# Patient Record
Sex: MALE | Race: WHITE | Employment: OTHER | URBAN - METROPOLITAN AREA
[De-identification: names, ages, dates, MRNs, and addresses within clinical notes are randomized per-mention and may not be internally consistent; named-entity substitution may affect disease eponyms.]

---

## 2017-01-02 ENCOUNTER — APPOINTMENT (OUTPATIENT)
Dept: CARDIAC REHAB | Age: 72
End: 2017-01-02
Payer: MEDICARE

## 2017-01-04 ENCOUNTER — APPOINTMENT (OUTPATIENT)
Dept: CARDIAC REHAB | Age: 72
End: 2017-01-04
Payer: MEDICARE

## 2017-01-06 ENCOUNTER — APPOINTMENT (OUTPATIENT)
Dept: CARDIAC REHAB | Age: 72
End: 2017-01-06
Payer: MEDICARE

## 2017-01-09 ENCOUNTER — HOSPITAL ENCOUNTER (OUTPATIENT)
Dept: CARDIAC REHAB | Age: 72
End: 2017-01-09
Payer: MEDICARE

## 2017-01-11 ENCOUNTER — HOSPITAL ENCOUNTER (OUTPATIENT)
Dept: CARDIAC REHAB | Age: 72
Discharge: HOME | End: 2017-01-11
Payer: MEDICARE

## 2017-01-13 ENCOUNTER — HOSPITAL ENCOUNTER (OUTPATIENT)
Dept: CARDIAC REHAB | Age: 72
Discharge: HOME | End: 2017-01-13
Payer: MEDICARE

## 2017-01-16 ENCOUNTER — APPOINTMENT (OUTPATIENT)
Dept: CARDIAC REHAB | Age: 72
End: 2017-01-16
Payer: MEDICARE

## 2017-01-18 ENCOUNTER — HOSPITAL ENCOUNTER (OUTPATIENT)
Dept: CARDIAC REHAB | Age: 72
Discharge: HOME | End: 2017-01-18
Payer: MEDICARE

## 2017-01-18 VITALS — BODY MASS INDEX: 23.15 KG/M2 | WEIGHT: 166 LBS

## 2017-01-18 PROCEDURE — 93798 PHYS/QHP OP CAR RHAB W/ECG: CPT

## 2017-01-20 ENCOUNTER — APPOINTMENT (OUTPATIENT)
Dept: CARDIAC REHAB | Age: 72
End: 2017-01-20
Payer: MEDICARE

## 2017-01-23 ENCOUNTER — APPOINTMENT (OUTPATIENT)
Dept: CARDIAC REHAB | Age: 72
End: 2017-01-23
Payer: MEDICARE

## 2017-01-25 ENCOUNTER — HOSPITAL ENCOUNTER (OUTPATIENT)
Dept: CARDIAC REHAB | Age: 72
Discharge: HOME | End: 2017-01-25
Payer: MEDICARE

## 2017-01-25 ENCOUNTER — HOSPITAL ENCOUNTER (EMERGENCY)
Age: 72
Discharge: HOME | End: 2017-01-25
Attending: EMERGENCY MEDICINE
Payer: MEDICARE

## 2017-01-25 ENCOUNTER — APPOINTMENT (OUTPATIENT)
Dept: GENERAL RADIOLOGY | Age: 72
End: 2017-01-25
Attending: EMERGENCY MEDICINE
Payer: MEDICARE

## 2017-01-25 VITALS — WEIGHT: 165.2 LBS | BODY MASS INDEX: 23.04 KG/M2

## 2017-01-25 VITALS
TEMPERATURE: 98.6 F | WEIGHT: 165.2 LBS | OXYGEN SATURATION: 98 % | RESPIRATION RATE: 18 BRPM | BODY MASS INDEX: 23.13 KG/M2 | SYSTOLIC BLOOD PRESSURE: 145 MMHG | HEART RATE: 63 BPM | DIASTOLIC BLOOD PRESSURE: 74 MMHG | HEIGHT: 71 IN

## 2017-01-25 DIAGNOSIS — R55 SYNCOPE AND COLLAPSE: Primary | ICD-10-CM

## 2017-01-25 LAB
ALBUMIN SERPL BCP-MCNC: 3.5 G/DL (ref 3.2–4.6)
ALBUMIN/GLOB SERPL: 1 {RATIO} (ref 1.2–3.5)
ALP SERPL-CCNC: 71 U/L (ref 50–136)
ALT SERPL-CCNC: 26 U/L (ref 12–65)
ANION GAP BLD CALC-SCNC: 7 MMOL/L (ref 7–16)
AST SERPL W P-5'-P-CCNC: 21 U/L (ref 15–37)
ATRIAL RATE: 52 BPM
BASOPHILS # BLD AUTO: 0 K/UL (ref 0–0.2)
BASOPHILS # BLD: 0 % (ref 0–2)
BILIRUB SERPL-MCNC: 0.5 MG/DL (ref 0.2–1.1)
BNP SERPL-MCNC: 52 PG/ML
BUN SERPL-MCNC: 16 MG/DL (ref 8–23)
CALCIUM SERPL-MCNC: 8.6 MG/DL (ref 8.3–10.4)
CALCULATED P AXIS, ECG09: 64 DEGREES
CALCULATED R AXIS, ECG10: 15 DEGREES
CALCULATED T AXIS, ECG11: -9 DEGREES
CHLORIDE SERPL-SCNC: 105 MMOL/L (ref 98–107)
CO2 SERPL-SCNC: 28 MMOL/L (ref 21–32)
CREAT SERPL-MCNC: 1.56 MG/DL (ref 0.8–1.5)
DIAGNOSIS, 93000: NORMAL
DIASTOLIC BP, ECG02: NORMAL MMHG
DIFFERENTIAL METHOD BLD: ABNORMAL
EOSINOPHIL # BLD: 0.4 K/UL (ref 0–0.8)
EOSINOPHIL NFR BLD: 3 % (ref 0.5–7.8)
ERYTHROCYTE [DISTWIDTH] IN BLOOD BY AUTOMATED COUNT: 12.9 % (ref 11.9–14.6)
GLOBULIN SER CALC-MCNC: 3.5 G/DL (ref 2.3–3.5)
GLUCOSE SERPL-MCNC: 99 MG/DL (ref 65–100)
HCT VFR BLD AUTO: 39.4 % (ref 41.1–50.3)
HGB BLD-MCNC: 13.1 G/DL (ref 13.6–17.2)
IMM GRANULOCYTES # BLD: 0 K/UL (ref 0–0.5)
IMM GRANULOCYTES NFR BLD AUTO: 0.2 % (ref 0–5)
LYMPHOCYTES # BLD AUTO: 16 % (ref 13–44)
LYMPHOCYTES # BLD: 2.3 K/UL (ref 0.5–4.6)
MAGNESIUM SERPL-MCNC: 2.3 MG/DL (ref 1.8–2.4)
MCH RBC QN AUTO: 30.5 PG (ref 26.1–32.9)
MCHC RBC AUTO-ENTMCNC: 33.2 G/DL (ref 31.4–35)
MCV RBC AUTO: 91.6 FL (ref 79.6–97.8)
MONOCYTES # BLD: 0.6 K/UL (ref 0.1–1.3)
MONOCYTES NFR BLD AUTO: 4 % (ref 4–12)
NEUTS SEG # BLD: 11 K/UL (ref 1.7–8.2)
NEUTS SEG NFR BLD AUTO: 77 % (ref 43–78)
P-R INTERVAL, ECG05: 146 MS
PLATELET # BLD AUTO: 230 K/UL (ref 150–450)
PMV BLD AUTO: 11.1 FL (ref 10.8–14.1)
POTASSIUM SERPL-SCNC: 4.5 MMOL/L (ref 3.5–5.1)
PROT SERPL-MCNC: 7 G/DL (ref 6.3–8.2)
Q-T INTERVAL, ECG07: 440 MS
QRS DURATION, ECG06: 72 MS
QTC CALCULATION (BEZET), ECG08: 409 MS
RBC # BLD AUTO: 4.3 M/UL (ref 4.23–5.67)
SODIUM SERPL-SCNC: 140 MMOL/L (ref 136–145)
SYSTOLIC BP, ECG01: NORMAL MMHG
TROPONIN I SERPL-MCNC: <0.02 NG/ML (ref 0.02–0.05)
TROPONIN I SERPL-MCNC: <0.02 NG/ML (ref 0.02–0.05)
VENTRICULAR RATE, ECG03: 52 BPM
WBC # BLD AUTO: 14.4 K/UL (ref 4.3–11.1)

## 2017-01-25 PROCEDURE — 99285 EMERGENCY DEPT VISIT HI MDM: CPT | Performed by: EMERGENCY MEDICINE

## 2017-01-25 PROCEDURE — 80053 COMPREHEN METABOLIC PANEL: CPT

## 2017-01-25 PROCEDURE — 93005 ELECTROCARDIOGRAM TRACING: CPT

## 2017-01-25 PROCEDURE — 83880 ASSAY OF NATRIURETIC PEPTIDE: CPT | Performed by: EMERGENCY MEDICINE

## 2017-01-25 PROCEDURE — 71010 XR CHEST PORT: CPT

## 2017-01-25 PROCEDURE — 96361 HYDRATE IV INFUSION ADD-ON: CPT | Performed by: EMERGENCY MEDICINE

## 2017-01-25 PROCEDURE — 83735 ASSAY OF MAGNESIUM: CPT | Performed by: EMERGENCY MEDICINE

## 2017-01-25 PROCEDURE — 96360 HYDRATION IV INFUSION INIT: CPT | Performed by: EMERGENCY MEDICINE

## 2017-01-25 PROCEDURE — 93798 PHYS/QHP OP CAR RHAB W/ECG: CPT

## 2017-01-25 PROCEDURE — 84484 ASSAY OF TROPONIN QUANT: CPT

## 2017-01-25 PROCEDURE — 85025 COMPLETE CBC W/AUTO DIFF WBC: CPT

## 2017-01-25 PROCEDURE — 74011250636 HC RX REV CODE- 250/636: Performed by: EMERGENCY MEDICINE

## 2017-01-25 RX ADMIN — SODIUM CHLORIDE 1000 ML: 900 INJECTION, SOLUTION INTRAVENOUS at 17:41

## 2017-01-25 NOTE — ED TRIAGE NOTES
Reports was a cardiac rehab and began to feel dizzy and then states he passed out. Reports has done this x 2 before.  States  [facility] WINSTON DEL CID JR. [facility] is his cardiologist.

## 2017-01-26 NOTE — ED NOTES
I have reviewed medications, follow up provider options, and discharge instructions with the patient. The patient verbalized understanding. Copy of discharge information given to patient upon discharge. Patient discharged ambulatory in no distress.

## 2017-01-26 NOTE — DISCHARGE INSTRUCTIONS

## 2017-01-27 ENCOUNTER — HOSPITAL ENCOUNTER (OUTPATIENT)
Dept: CARDIAC REHAB | Age: 72
Discharge: HOME | End: 2017-01-27
Payer: MEDICARE

## 2017-01-27 PROCEDURE — 93798 PHYS/QHP OP CAR RHAB W/ECG: CPT

## 2017-01-30 ENCOUNTER — HOSPITAL ENCOUNTER (OUTPATIENT)
Dept: CARDIAC REHAB | Age: 72
End: 2017-01-30
Payer: MEDICARE

## 2017-02-01 ENCOUNTER — APPOINTMENT (OUTPATIENT)
Dept: CARDIAC REHAB | Age: 72
End: 2017-02-01
Payer: MEDICARE

## 2017-02-03 ENCOUNTER — HOSPITAL ENCOUNTER (OUTPATIENT)
Dept: CARDIAC REHAB | Age: 72
End: 2017-02-03
Payer: MEDICARE

## 2017-02-06 ENCOUNTER — APPOINTMENT (OUTPATIENT)
Dept: CARDIAC REHAB | Age: 72
End: 2017-02-06
Payer: MEDICARE

## 2017-02-08 ENCOUNTER — HOSPITAL ENCOUNTER (OUTPATIENT)
Dept: CARDIAC REHAB | Age: 72
Discharge: HOME | End: 2017-02-08
Payer: MEDICARE

## 2017-02-08 VITALS — BODY MASS INDEX: 23.32 KG/M2 | WEIGHT: 167.2 LBS

## 2017-02-08 PROCEDURE — 93798 PHYS/QHP OP CAR RHAB W/ECG: CPT

## 2017-02-10 ENCOUNTER — HOSPITAL ENCOUNTER (OUTPATIENT)
Dept: CARDIAC REHAB | Age: 72
Discharge: HOME | End: 2017-02-10
Payer: MEDICARE

## 2017-02-10 PROCEDURE — 93798 PHYS/QHP OP CAR RHAB W/ECG: CPT

## 2017-02-13 ENCOUNTER — HOSPITAL ENCOUNTER (OUTPATIENT)
Dept: CARDIAC REHAB | Age: 72
End: 2017-02-13
Payer: MEDICARE

## 2017-02-15 ENCOUNTER — APPOINTMENT (OUTPATIENT)
Dept: CARDIAC REHAB | Age: 72
End: 2017-02-15
Payer: MEDICARE

## 2017-02-17 ENCOUNTER — HOSPITAL ENCOUNTER (OUTPATIENT)
Dept: CARDIAC REHAB | Age: 72
End: 2017-02-17
Payer: MEDICARE

## 2017-02-22 ENCOUNTER — APPOINTMENT (OUTPATIENT)
Dept: CARDIAC REHAB | Age: 72
End: 2017-02-22
Payer: MEDICARE

## 2017-02-24 ENCOUNTER — HOSPITAL ENCOUNTER (OUTPATIENT)
Dept: CARDIAC REHAB | Age: 72
Discharge: HOME | End: 2017-02-24
Payer: MEDICARE

## 2017-02-24 PROCEDURE — 93798 PHYS/QHP OP CAR RHAB W/ECG: CPT

## 2017-02-27 ENCOUNTER — HOSPITAL ENCOUNTER (OUTPATIENT)
Dept: CARDIAC REHAB | Age: 72
Discharge: HOME | End: 2017-02-27
Payer: MEDICARE

## 2017-03-01 ENCOUNTER — HOSPITAL ENCOUNTER (OUTPATIENT)
Dept: CARDIAC REHAB | Age: 72
Discharge: HOME | End: 2017-03-01
Payer: MEDICARE

## 2017-03-01 VITALS — WEIGHT: 164.4 LBS | BODY MASS INDEX: 22.93 KG/M2

## 2017-03-01 PROCEDURE — 93798 PHYS/QHP OP CAR RHAB W/ECG: CPT

## 2017-03-03 ENCOUNTER — HOSPITAL ENCOUNTER (OUTPATIENT)
Dept: CARDIAC REHAB | Age: 72
Discharge: HOME | End: 2017-03-03
Payer: MEDICARE

## 2017-03-03 VITALS — WEIGHT: 164.4 LBS | BODY MASS INDEX: 22.93 KG/M2

## 2017-03-03 PROCEDURE — 93798 PHYS/QHP OP CAR RHAB W/ECG: CPT

## 2017-03-06 ENCOUNTER — APPOINTMENT (OUTPATIENT)
Dept: CARDIAC REHAB | Age: 72
End: 2017-03-06
Payer: MEDICARE

## 2017-03-08 ENCOUNTER — HOSPITAL ENCOUNTER (OUTPATIENT)
Dept: CARDIAC REHAB | Age: 72
Discharge: HOME | End: 2017-03-08
Payer: MEDICARE

## 2017-03-10 ENCOUNTER — HOSPITAL ENCOUNTER (OUTPATIENT)
Dept: CARDIAC REHAB | Age: 72
End: 2017-03-10
Payer: MEDICARE

## 2017-03-13 ENCOUNTER — HOSPITAL ENCOUNTER (OUTPATIENT)
Dept: CARDIAC REHAB | Age: 72
Discharge: HOME | End: 2017-03-13
Payer: MEDICARE

## 2017-03-15 ENCOUNTER — APPOINTMENT (OUTPATIENT)
Dept: CARDIAC REHAB | Age: 72
End: 2017-03-15
Payer: MEDICARE

## 2017-03-17 ENCOUNTER — APPOINTMENT (OUTPATIENT)
Dept: CARDIAC REHAB | Age: 72
End: 2017-03-17
Payer: MEDICARE

## 2017-03-20 ENCOUNTER — HOSPITAL ENCOUNTER (OUTPATIENT)
Dept: CARDIAC REHAB | Age: 72
End: 2017-03-20
Payer: MEDICARE

## 2017-03-22 ENCOUNTER — HOSPITAL ENCOUNTER (OUTPATIENT)
Dept: CARDIAC REHAB | Age: 72
Discharge: HOME | End: 2017-03-22
Payer: MEDICARE

## 2017-03-22 VITALS — BODY MASS INDEX: 22.85 KG/M2 | WEIGHT: 163.8 LBS

## 2017-03-22 PROCEDURE — 93798 PHYS/QHP OP CAR RHAB W/ECG: CPT

## 2017-03-24 ENCOUNTER — APPOINTMENT (OUTPATIENT)
Dept: CARDIAC REHAB | Age: 72
End: 2017-03-24
Payer: MEDICARE

## 2017-03-27 ENCOUNTER — APPOINTMENT (OUTPATIENT)
Dept: CARDIAC REHAB | Age: 72
End: 2017-03-27
Payer: MEDICARE

## 2017-03-29 ENCOUNTER — HOSPITAL ENCOUNTER (OUTPATIENT)
Dept: CARDIAC REHAB | Age: 72
Discharge: HOME | End: 2017-03-29
Payer: MEDICARE

## 2017-03-29 PROCEDURE — 93798 PHYS/QHP OP CAR RHAB W/ECG: CPT

## 2017-03-31 ENCOUNTER — APPOINTMENT (OUTPATIENT)
Dept: CARDIAC REHAB | Age: 72
End: 2017-03-31
Payer: MEDICARE

## 2017-04-03 ENCOUNTER — HOSPITAL ENCOUNTER (OUTPATIENT)
Dept: CARDIAC REHAB | Age: 72
Discharge: HOME | End: 2017-04-03
Payer: MEDICARE

## 2017-04-03 PROCEDURE — 93798 PHYS/QHP OP CAR RHAB W/ECG: CPT

## 2017-04-05 ENCOUNTER — HOSPITAL ENCOUNTER (OUTPATIENT)
Dept: CARDIAC REHAB | Age: 72
End: 2017-04-05
Payer: MEDICARE

## 2017-04-07 ENCOUNTER — HOSPITAL ENCOUNTER (OUTPATIENT)
Dept: CARDIAC REHAB | Age: 72
Discharge: HOME | End: 2017-04-07
Payer: MEDICARE

## 2017-04-10 ENCOUNTER — APPOINTMENT (OUTPATIENT)
Dept: CARDIAC REHAB | Age: 72
End: 2017-04-10
Payer: MEDICARE

## 2017-04-12 ENCOUNTER — HOSPITAL ENCOUNTER (OUTPATIENT)
Dept: CARDIAC REHAB | Age: 72
Discharge: HOME | End: 2017-04-12
Payer: MEDICARE

## 2017-04-12 VITALS — WEIGHT: 160.8 LBS | BODY MASS INDEX: 22.43 KG/M2

## 2017-04-12 PROCEDURE — 93798 PHYS/QHP OP CAR RHAB W/ECG: CPT

## 2017-04-17 ENCOUNTER — APPOINTMENT (OUTPATIENT)
Dept: CARDIAC REHAB | Age: 72
End: 2017-04-17
Payer: MEDICARE

## 2017-04-19 ENCOUNTER — APPOINTMENT (OUTPATIENT)
Dept: CARDIAC REHAB | Age: 72
End: 2017-04-19
Payer: MEDICARE

## 2017-04-21 ENCOUNTER — HOSPITAL ENCOUNTER (OUTPATIENT)
Dept: CARDIAC REHAB | Age: 72
End: 2017-04-21
Payer: MEDICARE

## 2017-04-24 ENCOUNTER — APPOINTMENT (OUTPATIENT)
Dept: CARDIAC REHAB | Age: 72
End: 2017-04-24
Payer: MEDICARE

## 2017-04-26 ENCOUNTER — APPOINTMENT (OUTPATIENT)
Dept: CARDIAC REHAB | Age: 72
End: 2017-04-26
Payer: MEDICARE

## 2017-04-28 ENCOUNTER — HOSPITAL ENCOUNTER (OUTPATIENT)
Dept: CARDIAC REHAB | Age: 72
End: 2017-04-28
Payer: MEDICARE

## 2017-05-01 ENCOUNTER — HOSPITAL ENCOUNTER (OUTPATIENT)
Dept: CARDIAC REHAB | Age: 72
End: 2017-05-01

## 2017-05-03 ENCOUNTER — APPOINTMENT (OUTPATIENT)
Dept: CARDIAC REHAB | Age: 72
End: 2017-05-03

## 2017-05-05 ENCOUNTER — APPOINTMENT (OUTPATIENT)
Dept: CARDIAC REHAB | Age: 72
End: 2017-05-05

## 2017-05-08 ENCOUNTER — APPOINTMENT (OUTPATIENT)
Dept: CARDIAC REHAB | Age: 72
End: 2017-05-08

## 2017-08-16 PROBLEM — R73.9 HYPERGLYCEMIA: Status: ACTIVE | Noted: 2017-08-16

## 2017-08-21 ENCOUNTER — HOSPITAL ENCOUNTER (OUTPATIENT)
Dept: ULTRASOUND IMAGING | Age: 72
Discharge: HOME | End: 2017-08-21
Attending: INTERNAL MEDICINE
Payer: MEDICARE

## 2017-08-21 DIAGNOSIS — Z87.891 FORMER SMOKER: ICD-10-CM

## 2017-08-21 PROCEDURE — 93978 VASCULAR STUDY: CPT

## 2018-10-24 ENCOUNTER — HOSPITAL ENCOUNTER (OUTPATIENT)
Dept: ULTRASOUND IMAGING | Age: 73
Discharge: HOME | End: 2018-10-24
Attending: INTERNAL MEDICINE
Payer: MEDICARE

## 2018-10-24 DIAGNOSIS — I77.811 ABDOMINAL AORTIC ECTASIA (HCC): ICD-10-CM

## 2018-10-24 PROCEDURE — 93978 VASCULAR STUDY: CPT

## 2018-10-24 NOTE — PROGRESS NOTES
US showed the aorta was pretty much the same size as last year, 2.8 this time and 2.7 last year. They recommended rechecking in a year (Abdominal aortic US for abdominal aortic ectasia).

## 2018-10-29 NOTE — PROGRESS NOTES
Pt notified that [address] showed the aorta was pretty much the same size as last year, 2.8 this time and 2.7 last year. They recommended rechecking in a year (Abdominal aortic US for abdominal aortic ectasia).

## 2019-02-20 PROBLEM — N18.30 STAGE 3 CHRONIC KIDNEY DISEASE (HCC): Status: ACTIVE | Noted: 2019-02-20

## 2019-10-31 ENCOUNTER — HOSPITAL ENCOUNTER (OUTPATIENT)
Dept: CARDIAC CATH/INVASIVE PROCEDURES | Age: 74
Discharge: HOME | End: 2019-10-31
Attending: INTERNAL MEDICINE | Admitting: INTERNAL MEDICINE
Payer: MEDICARE

## 2019-10-31 VITALS
HEIGHT: 71 IN | BODY MASS INDEX: 22.68 KG/M2 | HEART RATE: 66 BPM | WEIGHT: 162 LBS | SYSTOLIC BLOOD PRESSURE: 131 MMHG | OXYGEN SATURATION: 97 % | DIASTOLIC BLOOD PRESSURE: 72 MMHG | RESPIRATION RATE: 16 BRPM

## 2019-10-31 LAB
ANION GAP SERPL CALC-SCNC: 5 MMOL/L (ref 7–16)
ATRIAL RATE: 62 BPM
BUN SERPL-MCNC: 17 MG/DL (ref 8–23)
CALCIUM SERPL-MCNC: 9.6 MG/DL (ref 8.3–10.4)
CALCULATED P AXIS, ECG09: 24 DEGREES
CALCULATED T AXIS, ECG11: 21 DEGREES
CHLORIDE SERPL-SCNC: 102 MMOL/L (ref 98–107)
CO2 SERPL-SCNC: 30 MMOL/L (ref 21–32)
CREAT SERPL-MCNC: 1.34 MG/DL (ref 0.8–1.5)
DIAGNOSIS, 93000: NORMAL
ERYTHROCYTE [DISTWIDTH] IN BLOOD BY AUTOMATED COUNT: 12.5 % (ref 11.9–14.6)
GLUCOSE SERPL-MCNC: 106 MG/DL (ref 65–100)
HCT VFR BLD AUTO: 40.5 % (ref 41.1–50.3)
HGB BLD-MCNC: 13.3 G/DL (ref 13.6–17.2)
INR PPP: 1
MAGNESIUM SERPL-MCNC: 2.2 MG/DL (ref 1.8–2.4)
MCH RBC QN AUTO: 30.7 PG (ref 26.1–32.9)
MCHC RBC AUTO-ENTMCNC: 32.8 G/DL (ref 31.4–35)
MCV RBC AUTO: 93.5 FL (ref 79.6–97.8)
NRBC # BLD: 0 K/UL (ref 0–0.2)
P-R INTERVAL, ECG05: 174 MS
PLATELET # BLD AUTO: 251 K/UL (ref 150–450)
PMV BLD AUTO: 10.9 FL (ref 9.4–12.3)
POTASSIUM SERPL-SCNC: 4.1 MMOL/L (ref 3.5–5.1)
PROTHROMBIN TIME: 13.3 SEC (ref 11.7–14.5)
Q-T INTERVAL, ECG07: 414 MS
QRS DURATION, ECG06: 76 MS
QTC CALCULATION (BEZET), ECG08: 420 MS
RBC # BLD AUTO: 4.33 M/UL (ref 4.23–5.6)
SODIUM SERPL-SCNC: 137 MMOL/L (ref 136–145)
VENTRICULAR RATE, ECG03: 62 BPM
WBC # BLD AUTO: 13.9 K/UL (ref 4.3–11.1)

## 2019-10-31 PROCEDURE — C1894 INTRO/SHEATH, NON-LASER: HCPCS

## 2019-10-31 PROCEDURE — C1887 CATHETER, GUIDING: HCPCS

## 2019-10-31 PROCEDURE — 77030004534 HC CATH ANGI DX INFN CARD -A

## 2019-10-31 PROCEDURE — 99152 MOD SED SAME PHYS/QHP 5/>YRS: CPT

## 2019-10-31 PROCEDURE — C1769 GUIDE WIRE: HCPCS

## 2019-10-31 PROCEDURE — 77030040934 HC CATH DIAG DXTERITY MEDT -A

## 2019-10-31 PROCEDURE — 74011250636 HC RX REV CODE- 250/636: Performed by: INTERNAL MEDICINE

## 2019-10-31 PROCEDURE — 93005 ELECTROCARDIOGRAM TRACING: CPT | Performed by: INTERNAL MEDICINE

## 2019-10-31 PROCEDURE — 74011636320 HC RX REV CODE- 636/320: Performed by: INTERNAL MEDICINE

## 2019-10-31 PROCEDURE — 85027 COMPLETE CBC AUTOMATED: CPT

## 2019-10-31 PROCEDURE — 74011000250 HC RX REV CODE- 250: Performed by: INTERNAL MEDICINE

## 2019-10-31 PROCEDURE — 80048 BASIC METABOLIC PNL TOTAL CA: CPT

## 2019-10-31 PROCEDURE — 85610 PROTHROMBIN TIME: CPT

## 2019-10-31 PROCEDURE — 99153 MOD SED SAME PHYS/QHP EA: CPT

## 2019-10-31 PROCEDURE — 77030029997 HC DEV COM RDL R BND TELE -B

## 2019-10-31 PROCEDURE — 83735 ASSAY OF MAGNESIUM: CPT

## 2019-10-31 PROCEDURE — 93458 L HRT ARTERY/VENTRICLE ANGIO: CPT

## 2019-10-31 RX ORDER — HEPARIN SODIUM 200 [USP'U]/100ML
3 INJECTION, SOLUTION INTRAVENOUS CONTINUOUS
Status: DISCONTINUED | OUTPATIENT
Start: 2019-10-31 | End: 2019-10-31 | Stop reason: HOSPADM

## 2019-10-31 RX ORDER — GUAIFENESIN 100 MG/5ML
81-324 LIQUID (ML) ORAL ONCE
Status: DISCONTINUED | OUTPATIENT
Start: 2019-10-31 | End: 2019-10-31 | Stop reason: HOSPADM

## 2019-10-31 RX ORDER — SODIUM CHLORIDE 9 MG/ML
75 INJECTION, SOLUTION INTRAVENOUS CONTINUOUS
Status: DISCONTINUED | OUTPATIENT
Start: 2019-10-31 | End: 2019-10-31 | Stop reason: HOSPADM

## 2019-10-31 RX ORDER — DIAZEPAM 5 MG/1
5 TABLET ORAL ONCE
Status: DISCONTINUED | OUTPATIENT
Start: 2019-10-31 | End: 2019-10-31 | Stop reason: HOSPADM

## 2019-10-31 RX ORDER — MIDAZOLAM HYDROCHLORIDE 1 MG/ML
.5-2 INJECTION, SOLUTION INTRAMUSCULAR; INTRAVENOUS
Status: DISCONTINUED | OUTPATIENT
Start: 2019-10-31 | End: 2019-10-31 | Stop reason: HOSPADM

## 2019-10-31 RX ORDER — LIDOCAINE HYDROCHLORIDE 10 MG/ML
10-200 INJECTION INFILTRATION; PERINEURAL ONCE
Status: COMPLETED | OUTPATIENT
Start: 2019-10-31 | End: 2019-10-31

## 2019-10-31 RX ORDER — HYDROMORPHONE HYDROCHLORIDE 1 MG/ML
.5-1 INJECTION, SOLUTION INTRAMUSCULAR; INTRAVENOUS; SUBCUTANEOUS
Status: DISCONTINUED | OUTPATIENT
Start: 2019-10-31 | End: 2019-10-31 | Stop reason: HOSPADM

## 2019-10-31 RX ADMIN — IOPAMIDOL 110 ML: 755 INJECTION, SOLUTION INTRAVENOUS at 16:00

## 2019-10-31 RX ADMIN — HEPARIN SODIUM 3 ML/HR: 5000 INJECTION, SOLUTION INTRAVENOUS; SUBCUTANEOUS at 15:45

## 2019-10-31 RX ADMIN — HEPARIN SODIUM 2 ML: 10000 INJECTION, SOLUTION INTRAVENOUS; SUBCUTANEOUS at 15:45

## 2019-10-31 RX ADMIN — LIDOCAINE HYDROCHLORIDE 3 ML: 10 INJECTION, SOLUTION INFILTRATION; PERINEURAL at 15:46

## 2019-10-31 RX ADMIN — MIDAZOLAM 2 MG: 1 INJECTION INTRAMUSCULAR; INTRAVENOUS at 15:42

## 2019-10-31 RX ADMIN — SODIUM CHLORIDE 75 ML/HR: 900 INJECTION, SOLUTION INTRAVENOUS at 13:36

## 2019-10-31 NOTE — PROGRESS NOTES
TRANSFER - OUT REPORT:    Verbal report given to Elder Graf RN on Jim Gaston  being transferred to [facility] for routine progression of care       Report consisted of patients Situation, Background, Assessment and Recommendations(SBAR). Information from the following report(s) SBAR was reviewed with the receiving nurse. Opportunity for questions and clarification was provided.     [facility] / Dr. Alexis Garcia   No intervention  2 mg versed  TR band 14 ml

## 2019-10-31 NOTE — PROGRESS NOTES
Discharge instructions and home medications reviewed with patient. Time allowed for questions and answers. Discharged to home by wheel chair.

## 2019-10-31 NOTE — PROGRESS NOTES
Report received from [facility] Cath Lab RN. Procedural findings communicated. Intra procedural  medication administration reviewed. Progression of care discussed.      Patient received into CPRU Garza 4 post sheath removal.     Right Radial access site without bleeding or swelling     TR band dry and intact     Patient instructed to limit movement to right upper extremity    Routine post procedural vital signs and site assessment initiated

## 2019-10-31 NOTE — PROCEDURES
Brief Cardiac Procedure Note    Patient: Holland Sierra MRN: [id number]  SSN: [id number]    YOB: 1945  Age: 69 y.o. Sex: male      Date of Procedure: 10/31/2019     Pre-procedure Diagnosis: Unstable Angina    Post-procedure Diagnosis: Coronary Artery Disease    Reason for Procedure: Worsening Angina    Procedure: Left Heart Catheterization    Brief Description of Procedure: rra    Performed By: Sang Huston MD     Assistants:     Anesthesia: Moderate Sedation    Estimated Blood Loss: Less than 10 mL      Specimens: None    Implants: None    Findings:   nml lv  lca ok  Stented rca ok    Complications: None    Recommendations: Continue medical therapy.     Signed By: Sang Huston MD     October 31, 2019

## 2019-10-31 NOTE — DISCHARGE INSTRUCTIONS

## 2019-10-31 NOTE — PROGRESS NOTES
Patient received to [address] [place] [address] room # 9  Ambulatory from [facility]. Patient scheduled for [facility] today with Dr Cassie Donovan. Procedure reviewed & questions answered, voiced good understanding consent obtained & placed on chart. All medications and medical history reviewed. Will prep patient per orders. Patient & family updated on plan of care. The patient has a fraility score of 3-MANAGING WELL, based on patient A&Ox3, patient able to ambulate to room without difficulty.

## 2019-11-02 NOTE — PROCEDURES
[facility]  CARDIAC CATH    Name:  Sierar Wiggins  MR#:  [id number]  :  1945  ACCOUNT #:  [de-identified]  DATE OF SERVICE:  10/31/2019    PROCEDURES PERFORMED:  Cardiac catheterization. PREOPERATIVE DIAGNOSES:  Chest pain due to worsening coronary artery disease. POSTOPERATIVE DIAGNOSES:  Stable coronary artery disease, chest pain, uncertain etiology. SURGEON:  Derek Robb MD    ASSISTANT:  0    ESTIMATED BLOOD LOSS:  0.    SPECIMENS REMOVED:  0.    COMPLICATIONS:  0.    IMPLANTS:  0.    ANESTHESIA:  0.    HISTORY:  This is a middle-aged gentleman with coronary artery disease. Three years ago, he had a  of right coronary artery opened up and stented. He has done well until recently; however, he has been having recurrent chest pain. GI was concerned that this is cardiac. A cardiac catheterization is recommended. PROCEDURE:  Via the right radial artery, a 5-Senegalese multipurpose was advanced to the ascending aorta and prolapsed across the aortic valve. Left ventriculography reveals normal left ventricular size and function. Ejection fraction is 60%. The left ventricular end-diastolic pressure was 10 mmHg and there was no gradient on pullback across the aortic valve. The right coronary artery was injected with a 3.5 EBU. This reveals nonobstructive atherosclerotic plaque in the mid segment. The distal segment has been stented to the crux. The stented segment is widely patent. There is mild focal restenosis in the midportion of the stented segment. The distal vessel is large with multiple distal vessels including the posterior descending. These vessels have atherosclerotic irregularity with no significant stenosis. The 3.5 EBU was used to inject the left coronary. It reveals left main to be normal.  It divides into LAD and left circumflex. The LAD has diffuse irregularity with some bridging in the midportion with no severe stenosis.   The major diagonal is a bifurcated vessel, one limb which has some moderate diffuse disease. The left circumflex is normal in its proximal portion. It gives rise to an obtuse marginal branch which has moderate nonobstructive proximal disease. IMPRESSION:  1. Normal left ventricular function. 2.  Coronary artery disease as described. There is diffuse atherosclerotic disease with no high-grade obstruction. The previously stented right coronary artery remains patent. RECOMMENDATIONS:  Medical therapy.       Mariam Esqueda MD      [id number]  D:  11/01/2019 17:35  T:  11/02/2019 2:14  JOB #:  [id number]

## 2020-02-25 PROBLEM — R97.20 ELEVATED PSA: Status: ACTIVE | Noted: 2020-02-25

## 2020-02-25 PROBLEM — I77.819 AORTIC ECTASIA (HCC): Status: ACTIVE | Noted: 2020-02-25

## 2020-09-02 ENCOUNTER — HOSPITAL ENCOUNTER (OUTPATIENT)
Dept: ULTRASOUND IMAGING | Age: 75
Discharge: HOME | End: 2020-09-02
Attending: INTERNAL MEDICINE
Payer: MEDICARE

## 2020-09-02 DIAGNOSIS — D64.9 ANEMIA, UNSPECIFIED TYPE: ICD-10-CM

## 2020-09-02 DIAGNOSIS — I77.819 AORTIC ECTASIA (HCC): ICD-10-CM

## 2020-09-02 DIAGNOSIS — D72.829 LEUKOCYTOSIS, UNSPECIFIED TYPE: ICD-10-CM

## 2020-09-02 DIAGNOSIS — R79.89 ELEVATED LFTS: ICD-10-CM

## 2020-09-02 PROCEDURE — 93978 VASCULAR STUDY: CPT

## 2020-09-08 ENCOUNTER — HOSPITAL ENCOUNTER (OUTPATIENT)
Dept: CT IMAGING | Age: 75
Discharge: HOME | End: 2020-09-08
Attending: INTERNAL MEDICINE

## 2020-09-08 DIAGNOSIS — R63.4 WEIGHT LOSS: ICD-10-CM

## 2020-09-08 DIAGNOSIS — R10.84 GENERALIZED ABDOMINAL PAIN: ICD-10-CM

## 2020-09-08 RX ORDER — SODIUM CHLORIDE 0.9 % (FLUSH) 0.9 %
10 SYRINGE (ML) INJECTION
Status: COMPLETED | OUTPATIENT
Start: 2020-09-08 | End: 2020-09-08

## 2020-09-08 RX ADMIN — Medication 10 ML: at 14:53

## 2020-09-23 ENCOUNTER — HOSPITAL ENCOUNTER (OUTPATIENT)
Dept: LAB | Age: 75
Discharge: HOME | End: 2020-09-23
Payer: MEDICARE

## 2020-09-23 DIAGNOSIS — D64.9 ANEMIA, UNSPECIFIED TYPE: ICD-10-CM

## 2020-09-23 DIAGNOSIS — D47.2 MGUS (MONOCLONAL GAMMOPATHY OF UNKNOWN SIGNIFICANCE): ICD-10-CM

## 2020-09-23 LAB
25(OH)D3 SERPL-MCNC: 16 NG/ML (ref 30–100)
ALBUMIN SERPL-MCNC: 3.1 G/DL (ref 3.2–4.6)
ALBUMIN/GLOB SERPL: 0.7 {RATIO} (ref 1.2–3.5)
ALP SERPL-CCNC: 91 U/L (ref 50–136)
ALT SERPL-CCNC: 23 U/L (ref 12–65)
ANION GAP SERPL CALC-SCNC: 2 MMOL/L (ref 7–16)
AST SERPL-CCNC: 9 U/L (ref 15–37)
BASOPHILS # BLD: 0.1 K/UL (ref 0–0.2)
BASOPHILS NFR BLD: 1 % (ref 0–2)
BILIRUB SERPL-MCNC: 0.3 MG/DL (ref 0.2–1.1)
BUN SERPL-MCNC: 15 MG/DL (ref 8–23)
CALCIUM SERPL-MCNC: 9.3 MG/DL (ref 8.3–10.4)
CHLORIDE SERPL-SCNC: 104 MMOL/L (ref 98–107)
CO2 SERPL-SCNC: 30 MMOL/L (ref 21–32)
CREAT SERPL-MCNC: 1.4 MG/DL (ref 0.8–1.5)
CRP SERPL HS-MCNC: 21 MG/L
DAT POLY-SP REAG RBC QL: NORMAL
DIFFERENTIAL METHOD BLD: ABNORMAL
EOSINOPHIL # BLD: 0.2 K/UL (ref 0–0.8)
EOSINOPHIL NFR BLD: 2 % (ref 0.5–7.8)
ERYTHROCYTE [DISTWIDTH] IN BLOOD BY AUTOMATED COUNT: 13.7 % (ref 11.9–14.6)
ERYTHROCYTE [SEDIMENTATION RATE] IN BLOOD: 77 MM/HR (ref 0–20)
FERRITIN SERPL-MCNC: 300 NG/ML (ref 8–388)
GLOBULIN SER CALC-MCNC: 4.4 G/DL (ref 2.3–3.5)
GLUCOSE SERPL-MCNC: 93 MG/DL (ref 65–100)
HCT VFR BLD AUTO: 34 % (ref 41.1–50.3)
HGB BLD-MCNC: 10.8 G/DL (ref 13.6–17.2)
HGB RETIC QN AUTO: 32 PG (ref 29–35)
IMM GRANULOCYTES # BLD AUTO: 0 K/UL (ref 0–0.5)
IMM GRANULOCYTES NFR BLD AUTO: 0 % (ref 0–5)
IMM RETICS NFR: 17 % (ref 2.3–13.4)
IRON SATN MFR SERPL: 18 %
IRON SERPL-MCNC: 37 UG/DL (ref 35–150)
LDH SERPL L TO P-CCNC: 103 U/L (ref 110–210)
LYMPHOCYTES # BLD: 1.8 K/UL (ref 0.5–4.6)
LYMPHOCYTES NFR BLD: 18 % (ref 13–44)
Lab: NORMAL
MCH RBC QN AUTO: 28.8 PG (ref 26.1–32.9)
MCHC RBC AUTO-ENTMCNC: 31.8 G/DL (ref 31.4–35)
MCV RBC AUTO: 90.7 FL (ref 79.6–97.8)
MONOCYTES # BLD: 1 K/UL (ref 0.1–1.3)
MONOCYTES NFR BLD: 9 % (ref 4–12)
NEUTS SEG # BLD: 7.2 K/UL (ref 1.7–8.2)
NEUTS SEG NFR BLD: 70 % (ref 43–78)
NRBC # BLD: 0 K/UL (ref 0–0.2)
PLATELET # BLD AUTO: 362 K/UL (ref 150–450)
PMV BLD AUTO: 10 FL (ref 9.4–12.3)
POTASSIUM SERPL-SCNC: 4.6 MMOL/L (ref 3.5–5.1)
PROT SERPL-MCNC: 7.5 G/DL (ref 6.3–8.2)
RBC # BLD AUTO: 3.75 M/UL (ref 4.23–5.67)
REFERENCE LAB,REFLB: NORMAL
RETICS # AUTO: 0.07 M/UL (ref 0.03–0.1)
RETICS/RBC NFR AUTO: 1.8 % (ref 0.3–2)
SODIUM SERPL-SCNC: 136 MMOL/L (ref 136–145)
TEST DESCRIPTION:,ATST: NORMAL
TIBC SERPL-MCNC: 206 UG/DL (ref 250–450)
VIT B12 SERPL-MCNC: 673 PG/ML (ref 193–986)
WBC # BLD AUTO: 10.3 K/UL (ref 4.3–11.1)

## 2020-09-23 PROCEDURE — 86334 IMMUNOFIX E-PHORESIS SERUM: CPT

## 2020-09-23 PROCEDURE — 86141 C-REACTIVE PROTEIN HS: CPT

## 2020-09-23 PROCEDURE — 36415 COLL VENOUS BLD VENIPUNCTURE: CPT

## 2020-09-23 PROCEDURE — 86880 COOMBS TEST DIRECT: CPT

## 2020-09-23 PROCEDURE — 83615 LACTATE (LD) (LDH) ENZYME: CPT

## 2020-09-23 PROCEDURE — 80053 COMPREHEN METABOLIC PANEL: CPT

## 2020-09-23 PROCEDURE — 82232 ASSAY OF BETA-2 PROTEIN: CPT

## 2020-09-23 PROCEDURE — 82306 VITAMIN D 25 HYDROXY: CPT

## 2020-09-23 PROCEDURE — 83883 ASSAY NEPHELOMETRY NOT SPEC: CPT

## 2020-09-23 PROCEDURE — 85046 RETICYTE/HGB CONCENTRATE: CPT

## 2020-09-23 PROCEDURE — 83540 ASSAY OF IRON: CPT

## 2020-09-23 PROCEDURE — 82607 VITAMIN B-12: CPT

## 2020-09-23 PROCEDURE — 84165 PROTEIN E-PHORESIS SERUM: CPT

## 2020-09-23 PROCEDURE — 82728 ASSAY OF FERRITIN: CPT

## 2020-09-23 PROCEDURE — 85652 RBC SED RATE AUTOMATED: CPT

## 2020-09-23 PROCEDURE — 85025 COMPLETE CBC W/AUTO DIFF WBC: CPT

## 2020-09-27 LAB
ALBUMIN SERPL ELPH-MCNC: 3.31 G/DL (ref 3.2–5.6)
ALBUMIN/GLOB SERPL: 0.9 {RATIO} (ref 1.2–3.5)
ALPHA1 GLOB SERPL ELPH-MCNC: 0.33 G/DL (ref 0.1–0.4)
ALPHA2 GLOB SERPL ELPH-MCNC: 1.15 G/DL (ref 0.4–1.2)
B-GLOBULIN SERPL QL ELPH: 1.21 G/DL (ref 0.6–1.3)
GAMMA GLOB MFR SERPL ELPH: 0.99 G/DL (ref 0.5–1.6)
IGA SERPL-MCNC: 373 MG/DL (ref 85–499)
IGG SERPL-MCNC: 915 MG/DL (ref 610–1616)
IGM SERPL-MCNC: 137 MG/DL (ref 35–242)
M PROTEIN SERPL ELPH-MCNC: ABNORMAL G/DL
PROT PATTERN SERPL ELPH-IMP: ABNORMAL
PROT PATTERN SPEC IFE-IMP: ABNORMAL
PROT SERPL-MCNC: 7 G/DL (ref 6.3–8.2)

## 2020-09-28 LAB
B2 MICROGLOB SERPL-MCNC: 2.9 MG/L (ref 0.8–2.34)
KAPPA LC FREE SER-MCNC: 38.63 MG/L (ref 3.3–19.4)
KAPPA LC FREE/LAMBDA FREE SER: 1.52 {RATIO} (ref 0.26–1.65)
LAMBDA LC FREE SERPL-MCNC: 25.46 MG/L (ref 5.71–26.3)

## 2020-10-01 LAB — HFE GENE MUT ANL BLD/T: NORMAL

## 2020-10-07 ENCOUNTER — HOSPITAL ENCOUNTER (OUTPATIENT)
Dept: MRI IMAGING | Age: 75
Discharge: HOME | End: 2020-10-07
Attending: NURSE PRACTITIONER
Payer: MEDICARE

## 2020-10-07 DIAGNOSIS — Z79.01 LONG TERM (CURRENT) USE OF ANTICOAGULANTS: ICD-10-CM

## 2020-10-07 DIAGNOSIS — K76.9 LIVER DISEASE, UNSPECIFIED: ICD-10-CM

## 2020-10-07 DIAGNOSIS — K62.5 HEMORRHAGE OF ANUS AND RECTUM: ICD-10-CM

## 2020-10-07 DIAGNOSIS — R74.8 ABNORMAL LIVER ENZYMES: ICD-10-CM

## 2020-10-07 DIAGNOSIS — K21.9 GASTROESOPHAGEAL REFLUX DISEASE WITHOUT ESOPHAGITIS: ICD-10-CM

## 2020-10-07 DIAGNOSIS — D64.9 ANEMIA, UNSPECIFIED: ICD-10-CM

## 2020-10-07 PROCEDURE — 74183 MRI ABD W/O CNTR FLWD CNTR: CPT

## 2020-10-07 PROCEDURE — 74011250636 HC RX REV CODE- 250/636: Performed by: NURSE PRACTITIONER

## 2020-10-07 PROCEDURE — 74011000258 HC RX REV CODE- 258: Performed by: NURSE PRACTITIONER

## 2020-10-07 PROCEDURE — A9575 INJ GADOTERATE MEGLUMI 0.1ML: HCPCS | Performed by: NURSE PRACTITIONER

## 2020-10-07 RX ORDER — SODIUM CHLORIDE 0.9 % (FLUSH) 0.9 %
10 SYRINGE (ML) INJECTION
Status: COMPLETED | OUTPATIENT
Start: 2020-10-07 | End: 2020-10-07

## 2020-10-07 RX ORDER — GADOTERATE MEGLUMINE 376.9 MG/ML
15 INJECTION INTRAVENOUS
Status: COMPLETED | OUTPATIENT
Start: 2020-10-07 | End: 2020-10-07

## 2020-10-07 RX ADMIN — SODIUM CHLORIDE 100 ML: 900 INJECTION, SOLUTION INTRAVENOUS at 09:55

## 2020-10-07 RX ADMIN — GADOTERATE MEGLUMINE 15 ML: 376.9 INJECTION INTRAVENOUS at 09:55

## 2020-10-07 RX ADMIN — Medication 10 ML: at 09:55

## 2020-10-28 ENCOUNTER — HOSPITAL ENCOUNTER (OUTPATIENT)
Dept: LAB | Age: 75
Discharge: HOME | End: 2020-10-28
Payer: MEDICARE

## 2020-10-28 DIAGNOSIS — E55.9 VITAMIN D DEFICIENCY: ICD-10-CM

## 2020-10-28 DIAGNOSIS — D47.2 MGUS (MONOCLONAL GAMMOPATHY OF UNKNOWN SIGNIFICANCE): ICD-10-CM

## 2020-10-28 DIAGNOSIS — D64.9 ANEMIA, UNSPECIFIED TYPE: ICD-10-CM

## 2020-10-28 PROCEDURE — 83520 IMMUNOASSAY QUANT NOS NONAB: CPT

## 2020-10-28 PROCEDURE — 36415 COLL VENOUS BLD VENIPUNCTURE: CPT

## 2020-10-28 PROCEDURE — 82164 ANGIOTENSIN I ENZYME TEST: CPT

## 2020-10-28 PROCEDURE — 86235 NUCLEAR ANTIGEN ANTIBODY: CPT

## 2020-10-28 PROCEDURE — 86430 RHEUMATOID FACTOR TEST QUAL: CPT

## 2020-10-29 ENCOUNTER — HOSPITAL ENCOUNTER (OUTPATIENT)
Dept: LAB | Age: 75
Discharge: HOME | End: 2020-10-29

## 2020-10-29 LAB
ACE SERPL-CCNC: 6 U/L (ref 14–82)
ENA SS-A AB SER-ACNC: <0.2 AI (ref 0–0.9)
ENA SS-B AB SER-ACNC: <0.2 AI (ref 0–0.9)

## 2020-10-29 PROCEDURE — 88312 SPECIAL STAINS GROUP 1: CPT

## 2020-10-29 PROCEDURE — 88305 TISSUE EXAM BY PATHOLOGIST: CPT

## 2020-10-30 LAB
C-ANCA TITR SER IF: NORMAL TITER
DSDNA AB SER-ACNC: <1 IU/ML (ref 0–9)
HISTONE IGG SER IA-ACNC: 0.4 UNITS (ref 0–0.9)
MYELOPEROXIDASE AB SER IA-ACNC: <9 U/ML (ref 0–9)
P-ANCA ATYPICAL TITR SER IF: NORMAL TITER
P-ANCA TITR SER IF: NORMAL TITER
PROTEINASE3 AB SER IA-ACNC: <3.5 U/ML (ref 0–3.5)
RHEUMATOID FACT SER QL LA: NEGATIVE

## 2020-11-03 NOTE — PROGRESS NOTES
Pt has been instructed by his MD to hold plavix starting on 11/04/2020. This has been cleared by Dr Giovanni Ramírez his Cardiologist @[facility].

## 2020-11-09 ENCOUNTER — HOSPITAL ENCOUNTER (OUTPATIENT)
Dept: CT IMAGING | Age: 75
Discharge: HOME | End: 2020-11-09
Attending: INTERNAL MEDICINE
Payer: MEDICARE

## 2020-11-09 VITALS
TEMPERATURE: 98.1 F | DIASTOLIC BLOOD PRESSURE: 64 MMHG | HEART RATE: 62 BPM | OXYGEN SATURATION: 99 % | BODY MASS INDEX: 21.84 KG/M2 | HEIGHT: 71 IN | SYSTOLIC BLOOD PRESSURE: 138 MMHG | RESPIRATION RATE: 16 BRPM | WEIGHT: 156 LBS

## 2020-11-09 DIAGNOSIS — D47.2 MGUS (MONOCLONAL GAMMOPATHY OF UNKNOWN SIGNIFICANCE): ICD-10-CM

## 2020-11-09 LAB
BASOPHILS # BLD: 0.1 K/UL (ref 0–0.2)
BASOPHILS NFR BLD: 1 % (ref 0–2)
BONE MARROW PREP & W,BMA: NORMAL
DIFFERENTIAL METHOD BLD: ABNORMAL
EOSINOPHIL # BLD: 0.3 K/UL (ref 0–0.8)
EOSINOPHIL NFR BLD: 3 % (ref 0.5–7.8)
ERYTHROCYTE [DISTWIDTH] IN BLOOD BY AUTOMATED COUNT: 15.9 % (ref 11.9–14.6)
HCT VFR BLD AUTO: 38.2 % (ref 41.1–50.3)
HGB BLD-MCNC: 11.9 G/DL (ref 13.6–17.2)
IMM GRANULOCYTES # BLD AUTO: 0 K/UL (ref 0–0.5)
IMM GRANULOCYTES NFR BLD AUTO: 0 % (ref 0–5)
LYMPHOCYTES # BLD: 2.2 K/UL (ref 0.5–4.6)
LYMPHOCYTES NFR BLD: 21 % (ref 13–44)
MCH RBC QN AUTO: 28.5 PG (ref 26.1–32.9)
MCHC RBC AUTO-ENTMCNC: 31.2 G/DL (ref 31.4–35)
MCV RBC AUTO: 91.4 FL (ref 79.6–97.8)
MONOCYTES # BLD: 0.9 K/UL (ref 0.1–1.3)
MONOCYTES NFR BLD: 9 % (ref 4–12)
NEUTS SEG # BLD: 6.9 K/UL (ref 1.7–8.2)
NEUTS SEG NFR BLD: 67 % (ref 43–78)
NRBC # BLD: 0 K/UL (ref 0–0.2)
PLATELET # BLD AUTO: 275 K/UL (ref 150–450)
PMV BLD AUTO: 11.1 FL (ref 9.4–12.3)
RBC # BLD AUTO: 4.18 M/UL (ref 4.23–5.6)
WBC # BLD AUTO: 10.4 K/UL (ref 4.3–11.1)

## 2020-11-09 PROCEDURE — 88185 FLOWCYTOMETRY/TC ADD-ON: CPT

## 2020-11-09 PROCEDURE — 88305 TISSUE EXAM BY PATHOLOGIST: CPT

## 2020-11-09 PROCEDURE — 88342 IMHCHEM/IMCYTCHM 1ST ANTB: CPT

## 2020-11-09 PROCEDURE — 88365 INSITU HYBRIDIZATION (FISH): CPT

## 2020-11-09 PROCEDURE — 88364 INSITU HYBRIDIZATION (FISH): CPT

## 2020-11-09 PROCEDURE — 88313 SPECIAL STAINS GROUP 2: CPT

## 2020-11-09 PROCEDURE — 77030028872 CT BX BONE MARROW AND ASPIRATION

## 2020-11-09 PROCEDURE — 74011000250 HC RX REV CODE- 250: Performed by: RADIOLOGY

## 2020-11-09 PROCEDURE — 88311 DECALCIFY TISSUE: CPT

## 2020-11-09 PROCEDURE — 74011250636 HC RX REV CODE- 250/636: Performed by: RADIOLOGY

## 2020-11-09 PROCEDURE — 99152 MOD SED SAME PHYS/QHP 5/>YRS: CPT

## 2020-11-09 PROCEDURE — 85025 COMPLETE CBC W/AUTO DIFF WBC: CPT

## 2020-11-09 PROCEDURE — 88341 IMHCHEM/IMCYTCHM EA ADD ANTB: CPT

## 2020-11-09 PROCEDURE — 88184 FLOWCYTOMETRY/ TC 1 MARKER: CPT

## 2020-11-09 RX ORDER — FENTANYL CITRATE 50 UG/ML
25-100 INJECTION, SOLUTION INTRAMUSCULAR; INTRAVENOUS
Status: DISCONTINUED | OUTPATIENT
Start: 2020-11-09 | End: 2020-11-13 | Stop reason: HOSPADM

## 2020-11-09 RX ORDER — LIDOCAINE HYDROCHLORIDE 20 MG/ML
20-300 INJECTION, SOLUTION INFILTRATION; PERINEURAL
Status: DISCONTINUED | OUTPATIENT
Start: 2020-11-09 | End: 2020-11-13 | Stop reason: HOSPADM

## 2020-11-09 RX ORDER — DIPHENHYDRAMINE HYDROCHLORIDE 50 MG/ML
25-50 INJECTION, SOLUTION INTRAMUSCULAR; INTRAVENOUS
Status: DISCONTINUED | OUTPATIENT
Start: 2020-11-09 | End: 2020-11-13 | Stop reason: HOSPADM

## 2020-11-09 RX ORDER — SODIUM CHLORIDE 9 MG/ML
500 INJECTION, SOLUTION INTRAVENOUS CONTINUOUS
Status: DISCONTINUED | OUTPATIENT
Start: 2020-11-09 | End: 2020-11-10 | Stop reason: HOSPADM

## 2020-11-09 RX ORDER — MIDAZOLAM HYDROCHLORIDE 1 MG/ML
.5-2 INJECTION, SOLUTION INTRAMUSCULAR; INTRAVENOUS
Status: DISCONTINUED | OUTPATIENT
Start: 2020-11-09 | End: 2020-11-13 | Stop reason: HOSPADM

## 2020-11-09 RX ADMIN — MIDAZOLAM 1 MG: 1 INJECTION INTRAMUSCULAR; INTRAVENOUS at 14:09

## 2020-11-09 RX ADMIN — FENTANYL CITRATE 50 MCG: 50 INJECTION, SOLUTION INTRAMUSCULAR; INTRAVENOUS at 14:09

## 2020-11-09 RX ADMIN — SODIUM CHLORIDE 500 ML: 900 INJECTION, SOLUTION INTRAVENOUS at 13:59

## 2020-11-09 RX ADMIN — FENTANYL CITRATE 50 MCG: 50 INJECTION, SOLUTION INTRAMUSCULAR; INTRAVENOUS at 13:59

## 2020-11-09 RX ADMIN — MIDAZOLAM 1 MG: 1 INJECTION INTRAMUSCULAR; INTRAVENOUS at 13:59

## 2020-11-09 RX ADMIN — DIPHENHYDRAMINE HYDROCHLORIDE 50 MG: 50 INJECTION, SOLUTION INTRAMUSCULAR; INTRAVENOUS at 13:59

## 2020-11-09 RX ADMIN — LIDOCAINE HYDROCHLORIDE 100 MG: 20 INJECTION, SOLUTION INFILTRATION; PERINEURAL at 14:11

## 2020-11-09 NOTE — H&P
Department of Interventional Radiology  [phone]    History and Physical    Patient:  Nathaniel Love MRN:  [id number]  SSN:  [id number]    YOB: 1945  Age:  70 y.o. 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 NPO. Past Medical History:   Diagnosis Date    CAD (coronary artery disease)     Dr Iain Barnett  stents x 2    GERD (gastroesophageal reflux disease)     HLD (hyperlipidemia)     HTN (hypertension)     Ill-defined condition     VASOVAGAL SYNCOPE episode x 3    Psychiatric disorder     depression     Past Surgical History:   Procedure Laterality Date    CARDIAC SURG PROCEDURE UNLIST      stents x 2    HX COLONOSCOPY      HX OTHER SURGICAL      EGD/colonoscopy & esophagus dilation        Review of Systems:    Pertinent items are noted in the History of Present Illness. Prior to Admission medications    Medication Sig Start Date End Date Taking? Authorizing Provider   ergocalciferol (ERGOCALCIFEROL) 1,250 mcg (50,000 unit) capsule Take 1 Cap by mouth every seven (7) days. 10/28/20  Yes Camilla Govea MD   buPROPion XL (Wellbutrin XL) 150 mg tablet Take 1 Tab by mouth every morning. 8/25/20  Yes Suhail Baez MD   amLODIPine-benazepril (LOTREL) 5-20 mg per capsule TAKE ONE CAPSULE BY MOUTH ONCE DAILY 3/3/20  Yes Sylvester Rodriguez MD   metoprolol succinate (TOPROL-XL) 25 mg XL tablet TAKE ONE TABLET BY MOUTH ONCE DAILY 3/3/20  Yes Sylvester Rodriguez MD   lansoprazole (PREVACID) 30 mg capsule  1/21/20  Yes Provider, Historical   atorvastatin (LIPITOR) 80 mg tablet Take 1 Tab by mouth daily. 2/25/20  Yes Sylvester Rodriguez MD   aspirin delayed-release 81 mg tablet Take  by mouth daily.    Yes Provider, Historical   clopidogreL (PLAVIX) 75 mg tab TAKE ONE TABLET BY MOUTH ONCE DAILY 3/3/20   Maikel Deal MD   nitroglycerin (NITROSTAT) 0.4 mg SL tablet by SubLINGual route every five (5) minutes as needed for Chest Pain. Provider, Historical        No Known Allergies    Family History   Problem Relation Age of Onset    Hypertension Mother     Heart Disease Mother     Stroke Mother     Diabetes Brother     Cancer Neg Hx      Social History     Tobacco Use    Smoking status: Former Smoker     Packs/day: 1.00     Years: 35.00     Pack years: 35.00     Types: Cigarettes     Last attempt to quit: 1997     Years since quittin.2    Smokeless tobacco: Never Used   Substance Use Topics    Alcohol use:  Yes     Alcohol/week: 1.0 standard drinks     Types: 1 Cans of beer per week     Comment: very rarely        Objective:       Physical Examination:    Vitals:    20 1151   BP: (!) 171/77   Pulse: (!) 55   Resp: 18   Temp: 98.1 °F (36.7 °C)   SpO2: 99%   Weight: 70.8 kg (156 lb)   Height: 5' 11\" (1.803 m)       Pain Assessment                  HEART: regular rate and rhythm  LUNG: clear to auscultation bilaterally  ABDOMEN: normal findings: soft, non-tender  EXTREMITIES: normal strength, tone, and muscle mass    Laboratory:     Lab Results   Component Value Date/Time    Sodium 136 2020 01:17 PM    Sodium 138 2020 12:24 PM    Potassium 4.6 2020 01:17 PM    Potassium 4.6 2020 12:24 PM    Chloride 104 2020 01:17 PM    Chloride 99 2020 12:24 PM    CO2 30 2020 01:17 PM    CO2 24 2020 12:24 PM    Anion gap 2 (L) 2020 01:17 PM    Anion gap 5 (L) 10/31/2019 01:27 PM    Glucose 93 2020 01:17 PM    Glucose 102 (H) 2020 12:24 PM    BUN 15 2020 01:17 PM    BUN 15 2020 12:24 PM    Creatinine 1.40 2020 01:17 PM    Creatinine 1.34 (H) 2020 12:24 PM    GFR est AA >60 2020 01:17 PM    GFR est AA 61 2020 12:24 PM    GFR est non-AA 53 (L) 2020 01:17 PM    GFR est non-AA 53 (L) 2020 12:24 PM    Calcium 9.3 09/23/2020 01:17 PM    Calcium 9.4 09/01/2020 12:24 PM    Magnesium 2.2 10/31/2019 01:27 PM    Magnesium 2.3 01/25/2017 05:42 PM    Albumin 3.1 (L) 09/23/2020 01:17 PM    Albumin 3.7 09/01/2020 12:24 PM    Protein, total 7.0 09/23/2020 01:17 PM    Protein, total 7.5 09/23/2020 01:17 PM    Globulin 4.4 (H) 09/23/2020 01:17 PM    Globulin 3.5 01/25/2017 01:40 PM    A-G Ratio 0.9 (L) 09/23/2020 01:17 PM    A-G Ratio 0.7 (L) 09/23/2020 01:17 PM    ALT (SGPT) 23 09/23/2020 01:17 PM    ALT (SGPT) 70 (H) 09/01/2020 12:24 PM     Lab Results   Component Value Date/Time    WBC 10.4 11/09/2020 11:50 AM    WBC 10.3 09/23/2020 01:17 PM    HGB 11.9 (L) 11/09/2020 11:50 AM    HGB 10.8 (L) 09/23/2020 01:17 PM    HCT 38.2 (L) 11/09/2020 11:50 AM    HCT 34.0 (L) 09/23/2020 01:17 PM    PLATELET 653 98/75/7621 11:50 AM    PLATELET 543 57/55/3088 01:17 PM     Lab Results   Component Value Date/Time    Prothrombin time 13.3 10/31/2019 01:27 PM    INR 1.0 10/31/2019 01:27 PM       Assessment:     US    Hospital Problems  Date Reviewed: 10/1/2020    None          Plan:     Planned Procedure:  Bone marrow biopsy    Risks, benefits, and alternatives reviewed with patient and he agrees to proceed with the procedure.       Signed By: Manasa Jacobson PA-C     November 9, 2020

## 2020-11-09 NOTE — DISCHARGE INSTRUCTIONS
[address]  Department of Interventional Radiology  [address] [facility]  [phone] Office  [phone] Fax    BIOPSY DISCHARGE INSTRUCTIONS    General Instructions:     A biopsy is the removal of a small piece of tissue for microscopic examination or testing. Healthy tissue can be obtained for the purpose of tissue-type matching for transplants. Unhealthy tissues are more commonly biopsied to diagnose disease. Lung Biopsy:     A needle lung biopsy is performed when there is a mass discovered in the lung area. The most serious risk is infection, bleeding, and pneumothorax (a collapsed lung). Signs of pneumothorax include shortness of breath, rapid heart rate, and blueness of the skin. If any of these occur, call 911. Liver Biopsy: This test helps detect cancer, infections, and the cause of an enlargement of the liver or elevated liver enzymes. It also helps to diagnose a number of liver diseases. The pain associated with the procedure may be felt in the shoulder. The risks include internal bleeding, pneumothorax, and injury to the surrounding organs. Renal Biopsy: This procedure is sometimes done to evaluate a transplanted kidney. It is also used to evaluate unexplained decrease in kidney function, blood, or protein in the urine. You may see bright red blood in the urine the first 24 hours after the test. If the bleeding lasts longer, you need to call your doctor. There is a risk of infection and bleeding into the muscle, which may cause soreness. Spinal Biopsy: This test is sometimes done in conjunction with other procedures. Your back will be sore, as we are taking a small sample of bone, which is slightly more difficult to sample than tissue. General Biopsy:     A mass can grow in any area of the body, and we are taking a specimen as ordered by your doctor. The risks are the same.  They include bleeding, pain, and infection. Home Care Instructions: You may resume your regular diet and medication regimen. Do not drink alcohol, drive, or make any important legal decisions in the next 24 hours. Do not lift anything heavier than a gallon of milk until the soreness goes away. You may use over the counter acetaminophen or ibuprofen for the soreness. You may apply an ice pack to the affected area for 20-30 minutes at time for the first 24 hours. After that, you may apply a heat pack. Call If: You should call your Physician and/or the Radiology Nurse if you have any questions or concerns about the biopsy site. Call if you should have increased pain, fever, redness, drainage, or bleeding more than a small spot on the bandage. Follow-Up Instructions: Please see your ordering doctor as he/she has requested. To Reach Us: Interventional Radiology General Nurse Discharge    After general anesthesia or intravenous sedation, for 24 hours or while taking prescription Narcotics:  · Limit your activities  · Do not drive and operate hazardous machinery  · Do not make important personal or business decisions  · Do  not drink alcoholic beverages  · If you have not urinated within 8 hours after discharge, please contact your surgeon on call. * Please give a list of your current medications to your Primary Care Provider. * Please update this list whenever your medications are discontinued, doses are     changed, or new medications (including over-the-counter products) are added. * Please carry medication information at all times in case of emergency situations. These are general instructions for a healthy lifestyle:    No smoking/ No tobacco products/ Avoid exposure to second hand smoke  Surgeon General's Warning:  Quitting smoking now greatly reduces serious risk to your health.     Obesity, smoking, and sedentary lifestyle greatly increases your risk for illness  A healthy diet, regular physical exercise & weight monitoring are important for maintaining a healthy lifestyle    You may be retaining fluid if you have a history of heart failure or if you experience any of the following symptoms:  Weight gain of 3 pounds or more overnight or 5 pounds in a week, increased swelling in our hands or feet or shortness of breath while lying flat in bed. Please call your doctor as soon as you notice any of these symptoms; do not wait until your next office visit. Recognize signs and symptoms of STROKE:  F-face looks uneven    A-arms unable to move or move unevenly    S-speech slurred or non-existent    T-time-call 911 as soon as signs and symptoms begin-DO NOT go       Back to bed or wait to see if you get better-TIME IS BRAIN. If you have any questions about your procedure, please call the Interventional Radiology department at [phone]. After business hours (5pm) and weekends, call the answering service at [phone] and ask for the Radiologist on call to be paged.              Patient Signature:  Date: 11/9/2020  Discharging Nurse: Wilfrid Leyden, RN

## 2020-11-09 NOTE — PROGRESS NOTES
IR Nurse Pre-Procedure Checklist Part 2          Consent signed: Yes    H&P complete:  Yes    Antibiotics: Not applicable    Airway/Mallampati Done: Yes    Shaved: Yes    Pregnancy Form:Not applicable    Patient Position: Yes    MD Side: Yes     Biopsy Worksheet: Yes    Specimen Medium: Yes    The patient was counseled at length about the risks of gavino Covid-19 during their perioperative period and any recovery window from their procedure. The patient was made aware that gavino Covid-19  may worsen their prognosis for recovering from their procedure and lend to a higher morbidity and/or mortality risk. All material risks, benefits, and reasonable alternatives including postponing the procedure were discussed. The patient does  wish to proceed with the procedure at this time.

## 2020-11-09 NOTE — PROGRESS NOTES
TRANSFER - OUT REPORT:    Verbal report given to [address] [place] [address], RN on [place] [facility]  being transferred to IR Recovery for routine post - op       Report consisted of patients Situation, Background, Assessment and   Recommendations(SBAR). Information from the following report(s) SBAR, Procedure Summary and MAR was reviewed with the receiving nurse. Opportunity for questions and clarification was provided. Conscious Sedation:   100 Mcg of Fentanyl administered  2 Mg of Versed administered  50 Mg of Benadryl administered    Pt tolerated procedure well.      Visit Vitals  /70   Pulse 62   Temp 98.1 °F (36.7 °C)   Resp 16   Ht 5' 11\" (1.803 m)   Wt 70.8 kg (156 lb)   SpO2 99%   BMI 21.76 kg/m²     Past Medical History:   Diagnosis Date    CAD (coronary artery disease)     Dr Charlie Rivera  stents x 2    GERD (gastroesophageal reflux disease)     HLD (hyperlipidemia)     HTN (hypertension)     Ill-defined condition     VASOVAGAL SYNCOPE episode x 3    Psychiatric disorder     depression     Peripheral IV 11/09/20 Left Antecubital (Active)   Site Assessment Clean, dry, & intact 11/09/20 1153   Phlebitis Assessment 0 11/09/20 1153   Infiltration Assessment 0 11/09/20 1153   Dressing Status Clean, dry, & intact 11/09/20 1153   Hub Color/Line Status Pink 11/09/20 1153

## 2020-11-09 NOTE — PROCEDURES
Department of Interventional Radiology  [phone]        Interventional Radiology Brief Procedure Note    Patient: Kit Do MRN: [id number]  SSN: [id number]    YOB: 1945  Age: 70 y.o.   Sex: male      Date of Procedure: 11/9/2020    Pre-Procedure Diagnosis: MGUS    Post-Procedure Diagnosis: SAME    Procedure(s): Image Guided Biopsy    Brief Description of Procedure: CT guided bone marrow biopsy    Performed By: Ej Akins PA-C     Assistants: None    Anesthesia:Moderate Sedation per Brenda Watters MD    Estimated Blood Loss: Less than 10ml    Specimens:  aspirate and core    Implants:  None    Findings: no post biopsy bleeding    Complications: None    Recommendations: bedrest     Follow Up: Dr. Emerson Cleary    Signed By: Ej Akins PA-C     November 9, 2020

## 2020-11-21 LAB
FLOW CYTOMETRY, FBTC1: NORMAL
SPECIMEN SOURCE: NORMAL
TEST ORDERED:: NORMAL

## 2020-12-03 ENCOUNTER — HOSPITAL ENCOUNTER (OUTPATIENT)
Dept: LAB | Age: 75
Discharge: HOME | End: 2020-12-03
Payer: MEDICARE

## 2020-12-03 DIAGNOSIS — E55.9 VITAMIN D DEFICIENCY: ICD-10-CM

## 2020-12-03 DIAGNOSIS — D64.9 ANEMIA, UNSPECIFIED TYPE: ICD-10-CM

## 2020-12-03 DIAGNOSIS — D47.2 MGUS (MONOCLONAL GAMMOPATHY OF UNKNOWN SIGNIFICANCE): ICD-10-CM

## 2020-12-03 LAB
ALBUMIN SERPL-MCNC: 3.5 G/DL (ref 3.2–4.6)
ALBUMIN/GLOB SERPL: 1 {RATIO} (ref 1.2–3.5)
ALP SERPL-CCNC: 86 U/L (ref 50–136)
ALT SERPL-CCNC: 18 U/L (ref 12–65)
ANION GAP SERPL CALC-SCNC: 5 MMOL/L (ref 7–16)
AST SERPL-CCNC: 9 U/L (ref 15–37)
BASOPHILS # BLD: 0 K/UL (ref 0–0.2)
BASOPHILS NFR BLD: 0 % (ref 0–2)
BILIRUB SERPL-MCNC: 0.3 MG/DL (ref 0.2–1.1)
BUN SERPL-MCNC: 13 MG/DL (ref 8–23)
CALCIUM SERPL-MCNC: 8.9 MG/DL (ref 8.3–10.4)
CHLORIDE SERPL-SCNC: 106 MMOL/L (ref 98–107)
CO2 SERPL-SCNC: 28 MMOL/L (ref 21–32)
CREAT SERPL-MCNC: 1.3 MG/DL (ref 0.8–1.5)
DIFFERENTIAL METHOD BLD: ABNORMAL
EOSINOPHIL # BLD: 0.2 K/UL (ref 0–0.8)
EOSINOPHIL NFR BLD: 2 % (ref 0.5–7.8)
ERYTHROCYTE [DISTWIDTH] IN BLOOD BY AUTOMATED COUNT: 14.7 % (ref 11.9–14.6)
GLOBULIN SER CALC-MCNC: 3.6 G/DL (ref 2.3–3.5)
GLUCOSE SERPL-MCNC: 100 MG/DL (ref 65–100)
HCT VFR BLD AUTO: 39 % (ref 41.1–50.3)
HGB BLD-MCNC: 12.5 G/DL (ref 13.6–17.2)
IMM GRANULOCYTES # BLD AUTO: 0 K/UL (ref 0–0.5)
IMM GRANULOCYTES NFR BLD AUTO: 0 % (ref 0–5)
LYMPHOCYTES # BLD: 1.9 K/UL (ref 0.5–4.6)
LYMPHOCYTES NFR BLD: 20 % (ref 13–44)
MCH RBC QN AUTO: 28.7 PG (ref 26.1–32.9)
MCHC RBC AUTO-ENTMCNC: 32.1 G/DL (ref 31.4–35)
MCV RBC AUTO: 89.4 FL (ref 79.6–97.8)
MONOCYTES # BLD: 0.9 K/UL (ref 0.1–1.3)
MONOCYTES NFR BLD: 9 % (ref 4–12)
NEUTS SEG # BLD: 6.4 K/UL (ref 1.7–8.2)
NEUTS SEG NFR BLD: 68 % (ref 43–78)
NRBC # BLD: 0 K/UL (ref 0–0.2)
PLATELET # BLD AUTO: 222 K/UL (ref 150–450)
PMV BLD AUTO: 11 FL (ref 9.4–12.3)
POTASSIUM SERPL-SCNC: 4 MMOL/L (ref 3.5–5.1)
PROT SERPL-MCNC: 7.1 G/DL (ref 6.3–8.2)
RBC # BLD AUTO: 4.36 M/UL (ref 4.23–5.67)
SODIUM SERPL-SCNC: 139 MMOL/L (ref 136–145)
WBC # BLD AUTO: 9.4 K/UL (ref 4.3–11.1)

## 2020-12-03 PROCEDURE — 85025 COMPLETE CBC W/AUTO DIFF WBC: CPT

## 2020-12-03 PROCEDURE — 86334 IMMUNOFIX E-PHORESIS SERUM: CPT

## 2020-12-03 PROCEDURE — 36415 COLL VENOUS BLD VENIPUNCTURE: CPT

## 2020-12-03 PROCEDURE — 84165 PROTEIN E-PHORESIS SERUM: CPT

## 2020-12-03 PROCEDURE — 80053 COMPREHEN METABOLIC PANEL: CPT

## 2020-12-03 PROCEDURE — 82306 VITAMIN D 25 HYDROXY: CPT

## 2020-12-04 LAB
25(OH)D3+25(OH)D2 SERPL-MCNC: 56.1 NG/ML (ref 30–100)
ALBUMIN SERPL ELPH-MCNC: 3.77 G/DL (ref 3.2–5.6)
ALBUMIN/GLOB SERPL: 1.2 {RATIO}
ALPHA1 GLOB SERPL ELPH-MCNC: 0.2 G/DL (ref 0.1–0.4)
ALPHA2 GLOB SERPL ELPH-MCNC: 0.8 G/DL (ref 0.4–1.2)
B-GLOBULIN SERPL QL ELPH: 1.02 G/DL (ref 0.6–1.3)
GAMMA GLOB MFR SERPL ELPH: 1.02 G/DL (ref 0.5–1.6)
IGA SERPL-MCNC: 321 MG/DL (ref 85–499)
IGG SERPL-MCNC: 931 MG/DL (ref 610–1616)
IGM SERPL-MCNC: 170 MG/DL (ref 35–242)
M PROTEIN SERPL ELPH-MCNC: NORMAL G/DL
PROT PATTERN SERPL ELPH-IMP: NORMAL
PROT PATTERN SPEC IFE-IMP: NORMAL
PROT SERPL-MCNC: 6.8 G/DL (ref 6.3–8.2)

## 2021-03-24 ENCOUNTER — HOSPITAL ENCOUNTER (OUTPATIENT)
Dept: GENERAL RADIOLOGY | Age: 76
Discharge: HOME | End: 2021-03-24
Attending: INTERNAL MEDICINE
Payer: MEDICARE

## 2021-03-24 DIAGNOSIS — R70.0 ELEVATED ERYTHROCYTE SEDIMENTATION RATE: ICD-10-CM

## 2021-03-24 DIAGNOSIS — R79.82 CRP ELEVATED: ICD-10-CM

## 2021-03-24 PROCEDURE — 73130 X-RAY EXAM OF HAND: CPT

## 2021-03-24 PROCEDURE — 73630 X-RAY EXAM OF FOOT: CPT

## 2021-04-22 ENCOUNTER — HOSPITAL ENCOUNTER (OUTPATIENT)
Dept: LAB | Age: 76
Discharge: HOME | End: 2021-04-22
Payer: MEDICARE

## 2021-04-22 DIAGNOSIS — D47.2 MGUS (MONOCLONAL GAMMOPATHY OF UNKNOWN SIGNIFICANCE): ICD-10-CM

## 2021-04-22 LAB
25(OH)D3 SERPL-MCNC: 70 NG/ML (ref 30–100)
ALBUMIN SERPL-MCNC: 3.8 G/DL (ref 3.2–4.6)
ALBUMIN/GLOB SERPL: 1.1 {RATIO} (ref 1.2–3.5)
ALP SERPL-CCNC: 77 U/L (ref 50–136)
ALT SERPL-CCNC: 32 U/L (ref 12–65)
ANION GAP SERPL CALC-SCNC: 7 MMOL/L (ref 7–16)
AST SERPL-CCNC: 15 U/L (ref 15–37)
BASOPHILS # BLD: 0.1 K/UL (ref 0–0.2)
BASOPHILS NFR BLD: 1 % (ref 0–2)
BILIRUB SERPL-MCNC: 0.5 MG/DL (ref 0.2–1.1)
BUN SERPL-MCNC: 15 MG/DL (ref 8–23)
CALCIUM SERPL-MCNC: 9 MG/DL (ref 8.3–10.4)
CHLORIDE SERPL-SCNC: 101 MMOL/L (ref 98–107)
CO2 SERPL-SCNC: 28 MMOL/L (ref 21–32)
CREAT SERPL-MCNC: 1.6 MG/DL (ref 0.8–1.5)
DIFFERENTIAL METHOD BLD: ABNORMAL
EOSINOPHIL # BLD: 0.3 K/UL (ref 0–0.8)
EOSINOPHIL NFR BLD: 3 % (ref 0.5–7.8)
ERYTHROCYTE [DISTWIDTH] IN BLOOD BY AUTOMATED COUNT: 13.1 % (ref 11.9–14.6)
GLOBULIN SER CALC-MCNC: 3.4 G/DL (ref 2.3–3.5)
GLUCOSE SERPL-MCNC: 122 MG/DL (ref 65–100)
HCT VFR BLD AUTO: 39 %
HGB BLD-MCNC: 12.8 G/DL (ref 13.6–17.2)
IMM GRANULOCYTES # BLD AUTO: 0 K/UL (ref 0–0.5)
IMM GRANULOCYTES NFR BLD AUTO: 0 % (ref 0–5)
LYMPHOCYTES # BLD: 2.2 K/UL (ref 0.5–4.6)
LYMPHOCYTES NFR BLD: 25 % (ref 13–44)
MCH RBC QN AUTO: 30.8 PG (ref 26.1–32.9)
MCHC RBC AUTO-ENTMCNC: 32.8 G/DL (ref 31.4–35)
MCV RBC AUTO: 93.8 FL (ref 79.6–97.8)
MONOCYTES # BLD: 0.8 K/UL (ref 0.1–1.3)
MONOCYTES NFR BLD: 9 % (ref 4–12)
NEUTS SEG # BLD: 5.3 K/UL (ref 1.7–8.2)
NEUTS SEG NFR BLD: 62 % (ref 43–78)
NRBC # BLD: 0 K/UL (ref 0–0.2)
PLATELET # BLD AUTO: 205 K/UL (ref 150–450)
PMV BLD AUTO: 10.8 FL (ref 9.4–12.3)
POTASSIUM SERPL-SCNC: 4.5 MMOL/L (ref 3.5–5.1)
PROT SERPL-MCNC: 7.2 G/DL (ref 6.3–8.2)
RBC # BLD AUTO: 4.16 M/UL (ref 4.23–5.6)
SODIUM SERPL-SCNC: 136 MMOL/L (ref 136–145)
WBC # BLD AUTO: 8.6 K/UL (ref 4.3–11.1)

## 2021-04-22 PROCEDURE — 82306 VITAMIN D 25 HYDROXY: CPT

## 2021-04-22 PROCEDURE — 85025 COMPLETE CBC W/AUTO DIFF WBC: CPT

## 2021-04-22 PROCEDURE — 80053 COMPREHEN METABOLIC PANEL: CPT

## 2021-04-22 PROCEDURE — 36415 COLL VENOUS BLD VENIPUNCTURE: CPT

## 2021-04-22 PROCEDURE — 82784 ASSAY IGA/IGD/IGG/IGM EACH: CPT

## 2021-04-22 PROCEDURE — 86334 IMMUNOFIX E-PHORESIS SERUM: CPT

## 2021-04-23 LAB
ALBUMIN SERPL ELPH-MCNC: 3.72 G/DL (ref 3.2–5.6)
ALBUMIN/GLOB SERPL: 1.2 {RATIO}
ALPHA1 GLOB SERPL ELPH-MCNC: 0.22 G/DL (ref 0.1–0.4)
ALPHA2 GLOB SERPL ELPH-MCNC: 0.82 G/DL (ref 0.4–1.2)
B-GLOBULIN SERPL QL ELPH: 1.01 G/DL (ref 0.6–1.3)
GAMMA GLOB MFR SERPL ELPH: 1.03 G/DL (ref 0.5–1.6)
IGA SERPL-MCNC: 288 MG/DL (ref 85–499)
IGG SERPL-MCNC: 877 MG/DL (ref 610–1616)
IGM SERPL-MCNC: 182 MG/DL (ref 35–242)
M PROTEIN SERPL ELPH-MCNC: NORMAL G/DL
PROT PATTERN SERPL ELPH-IMP: NORMAL
PROT PATTERN SPEC IFE-IMP: NORMAL
PROT SERPL-MCNC: 6.8 G/DL (ref 6.3–8.2)

## 2021-08-26 PROBLEM — R73.03 PREDIABETES: Status: ACTIVE | Noted: 2017-08-16

## 2021-08-26 PROBLEM — N18.31 STAGE 3A CHRONIC KIDNEY DISEASE (HCC): Status: ACTIVE | Noted: 2019-02-20

## 2021-10-14 ENCOUNTER — HOSPITAL ENCOUNTER (OUTPATIENT)
Dept: LAB | Age: 76
Discharge: HOME | End: 2021-10-14
Payer: MEDICARE

## 2021-10-14 ENCOUNTER — HOSPITAL ENCOUNTER (OUTPATIENT)
Dept: LAB | Age: 76
Discharge: HOME | End: 2021-10-14

## 2021-10-14 DIAGNOSIS — E55.9 VITAMIN D DEFICIENCY: ICD-10-CM

## 2021-10-14 DIAGNOSIS — D47.2 MGUS (MONOCLONAL GAMMOPATHY OF UNKNOWN SIGNIFICANCE): ICD-10-CM

## 2021-10-14 DIAGNOSIS — Z15.89 HLA B27 (HLA B27 POSITIVE): ICD-10-CM

## 2021-10-14 LAB
25(OH)D3 SERPL-MCNC: 54.6 NG/ML (ref 30–100)
ALBUMIN SERPL-MCNC: 3.6 G/DL (ref 3.2–4.6)
ALBUMIN/GLOB SERPL: 1 {RATIO} (ref 1.2–3.5)
ALP SERPL-CCNC: 67 U/L (ref 50–136)
ALT SERPL-CCNC: 23 U/L (ref 12–65)
ANION GAP SERPL CALC-SCNC: 4 MMOL/L (ref 7–16)
AST SERPL-CCNC: 9 U/L (ref 15–37)
BASOPHILS # BLD: 0 K/UL (ref 0–0.2)
BASOPHILS NFR BLD: 1 % (ref 0–2)
BILIRUB SERPL-MCNC: 0.6 MG/DL (ref 0.2–1.1)
BUN SERPL-MCNC: 14 MG/DL (ref 8–23)
CALCIUM SERPL-MCNC: 9.3 MG/DL (ref 8.3–10.4)
CHLORIDE SERPL-SCNC: 105 MMOL/L (ref 98–107)
CO2 SERPL-SCNC: 29 MMOL/L (ref 21–32)
CREAT SERPL-MCNC: 1.4 MG/DL (ref 0.8–1.5)
CRP SERPL-MCNC: <0.3 MG/DL (ref 0–0.9)
DIFFERENTIAL METHOD BLD: ABNORMAL
EOSINOPHIL # BLD: 0.1 K/UL (ref 0–0.8)
EOSINOPHIL NFR BLD: 2 % (ref 0.5–7.8)
ERYTHROCYTE [DISTWIDTH] IN BLOOD BY AUTOMATED COUNT: 13 % (ref 11.9–14.6)
ERYTHROCYTE [SEDIMENTATION RATE] IN BLOOD: 15 MM/HR (ref 0–20)
GLOBULIN SER CALC-MCNC: 3.6 G/DL (ref 2.3–3.5)
GLUCOSE SERPL-MCNC: 102 MG/DL (ref 65–100)
HCT VFR BLD AUTO: 39.5 %
HGB BLD-MCNC: 12.7 G/DL (ref 13.6–17.2)
IMM GRANULOCYTES # BLD AUTO: 0 K/UL (ref 0–0.5)
IMM GRANULOCYTES NFR BLD AUTO: 0 % (ref 0–5)
LYMPHOCYTES # BLD: 2 K/UL (ref 0.5–4.6)
LYMPHOCYTES NFR BLD: 24 % (ref 13–44)
MCH RBC QN AUTO: 29.9 PG (ref 26.1–32.9)
MCHC RBC AUTO-ENTMCNC: 32.2 G/DL (ref 31.4–35)
MCV RBC AUTO: 92.9 FL (ref 79.6–97.8)
MONOCYTES # BLD: 0.9 K/UL (ref 0.1–1.3)
MONOCYTES NFR BLD: 11 % (ref 4–12)
NEUTS SEG # BLD: 5.1 K/UL (ref 1.7–8.2)
NEUTS SEG NFR BLD: 63 % (ref 43–78)
NRBC # BLD: 0 K/UL (ref 0–0.2)
PLATELET # BLD AUTO: 219 K/UL (ref 150–450)
PMV BLD AUTO: 11.2 FL (ref 9.4–12.3)
POTASSIUM SERPL-SCNC: 4.8 MMOL/L (ref 3.5–5.1)
PROT SERPL-MCNC: 7.2 G/DL (ref 6.3–8.2)
RBC # BLD AUTO: 4.25 M/UL (ref 4.23–5.6)
SODIUM SERPL-SCNC: 138 MMOL/L (ref 136–145)
WBC # BLD AUTO: 8.1 K/UL (ref 4.3–11.1)

## 2021-10-14 PROCEDURE — 36415 COLL VENOUS BLD VENIPUNCTURE: CPT

## 2021-10-14 PROCEDURE — 85652 RBC SED RATE AUTOMATED: CPT

## 2021-10-14 PROCEDURE — 85025 COMPLETE CBC W/AUTO DIFF WBC: CPT

## 2021-10-14 PROCEDURE — 86140 C-REACTIVE PROTEIN: CPT

## 2021-10-14 PROCEDURE — 80053 COMPREHEN METABOLIC PANEL: CPT

## 2021-10-14 PROCEDURE — 82306 VITAMIN D 25 HYDROXY: CPT

## 2021-10-14 PROCEDURE — 82784 ASSAY IGA/IGD/IGG/IGM EACH: CPT

## 2021-10-14 PROCEDURE — 86334 IMMUNOFIX E-PHORESIS SERUM: CPT

## 2021-10-18 LAB
ALBUMIN SERPL ELPH-MCNC: 3.83 G/DL (ref 3.2–5.6)
ALBUMIN/GLOB SERPL: 1.3 {RATIO}
ALPHA1 GLOB SERPL ELPH-MCNC: 0.24 G/DL (ref 0.1–0.4)
ALPHA2 GLOB SERPL ELPH-MCNC: 0.79 G/DL (ref 0.4–1.2)
B-GLOBULIN SERPL QL ELPH: 1.05 G/DL (ref 0.6–1.3)
GAMMA GLOB MFR SERPL ELPH: 0.99 G/DL (ref 0.5–1.6)
IGA SERPL-MCNC: 304 MG/DL (ref 85–499)
IGG SERPL-MCNC: 886 MG/DL (ref 610–1616)
IGM SERPL-MCNC: 204 MG/DL (ref 35–242)
M PROTEIN SERPL ELPH-MCNC: 0.27 G/DL
PROT PATTERN SERPL ELPH-IMP: ABNORMAL
PROT PATTERN SPEC IFE-IMP: ABNORMAL
PROT SERPL-MCNC: 6.9 G/DL (ref 6.3–8.2)

## 2021-10-19 ENCOUNTER — HOSPITAL ENCOUNTER (OUTPATIENT)
Dept: PHYSICAL THERAPY | Age: 76
Discharge: HOME | End: 2021-10-19
Attending: INTERNAL MEDICINE
Payer: MEDICARE

## 2021-10-19 DIAGNOSIS — G89.29 CHRONIC RIGHT SHOULDER PAIN: ICD-10-CM

## 2021-10-19 DIAGNOSIS — M25.511 CHRONIC RIGHT SHOULDER PAIN: ICD-10-CM

## 2021-10-19 PROCEDURE — 97162 PT EVAL MOD COMPLEX 30 MIN: CPT

## 2021-10-19 NOTE — PROGRESS NOTES
Avni Tyler  : 1945  Primary: [employer]  Secondary:  [address]  at [address] [address], [place] [address].  [phone]   [phone]        OUTPATIENT PHYSICAL THERAPY: Daily Treatment Note 10/19/2021  Visit Count:  1    ICD-10: Treatment Diagnosis:   · Pain in right shoulder (M25.511)  · Stiffness of right shoulder, not elsewhere classified (M25.611)  Precautions/Allergies:   Patient has no known allergies. TREATMENT PLAN:  Effective Dates: 10/19/2021 TO 2022 (90 days). Frequency/Duration: 2 times a week for 90 Day(s)    Pre-treatment Symptoms/Complaints:  10/19/2021: Patient reports he gets the pain randomly 2-3 times per day. Pain: Initial: Pain Intensity 1: 5  Pain Location 1: Shoulder  Pain Orientation 1: Right  Pain Intervention(s) 1: Rest, Medication (see MAR)  Post Session:  5/10   Medications Last Reviewed:  10/19/2021  Updated Objective Findings:  See evaluation note from today  TREATMENT:     THERAPEUTIC EXERCISE: (15 minutes):  Exercises per grid below to improve mobility and strength. Required minimal visual, verbal and manual cues to promote proper body alignment, promote proper body posture and promote proper body mechanics. Progressed resistance, range, repetitions and complexity of movement as indicated. Date:  10/19/2021   Activity/Exercise Parameters   Posterior capsule stretch reaching across body into horizontal ADDuction 10 second hold  3 reps  R UE  HEP   Standing shoulder flexion stretch against wall 10 second hold  3 reps  R UE  HEP      Treatment/Session Summary:    · Response to Treatment:  Patient tolerated assessment without complaints of increased right shoulder pain. Patient verbalized and demonstrated understanding of HEP.   · Communication/Consultation:  None today  · Equipment provided today:  None today  · Recommendations/Intent for next treatment session: Next visit will focus on improving overall mobility and pain with daily activities.     Total Treatment Billable Duration:  0 minutes + evaluation  PT Patient Time In/Time Out  Time In: 0930  Time Out: [address], PT     Visit # Therapist initials Date Arrived NS/ Cx < 24 hr >24 hr Cx Visit Comments   1 JS 10/19/2021 X    Initial Assessment Only Today                                                                                                                                                              Abbreviations: NS = No Show; CX = cancelled     Future Appointments   Date Time Provider Griselda Toscano   10/19/2021  9:30 AM Beryl Gonzalez PT [facility]   3/2/2022 10:00 AM Ibeth Panchal MD [facility]   4/11/2022 10:00 AM Hnanah Lerma MD [facility]   4/14/2022  2:30 PM Lina López [facility]   4/14/2022  3:00 PM Hanna Butler MD [facility]

## 2021-10-19 NOTE — THERAPY EVALUATION
Olive Estimable  : 1945  Primary: William Fish Medicare Hmo  Secondary:  [address] at [place] [address] [facility], [address] [place] [address],[address], [place] [address].  [phone]   [phone]         OUTPATIENT PHYSICAL THERAPY:Initial Assessment 10/19/2021   ICD-10: Treatment Diagnosis:   · Pain in right shoulder (M25.511)  · Stiffness of right shoulder, not elsewhere classified (M25.611)  Precautions/Allergies:   Patient has no known allergies. TREATMENT PLAN:  Effective Dates: 10/19/2021 TO 2022 (90 days). Frequency/Duration: 2 times a week for 90 Day(s) MEDICAL/REFERRING DIAGNOSIS:  Chronic right shoulder pain [M25.511, G89.29]   DATE OF ONSET: Chronic  REFERRING PHYSICIAN: Delmar Franklin MD MD Orders: Evaluate and Treat  Return MD Appointment: TBD     INITIAL ASSESSMENT:  Mr. Aníbal Clinton presents with decreased mobility, decreased strength, and pain in right shoulder secondary to degenerative changes. After discussing with patient, he agreed he would benefit from physical therapy to improve above deficits. Please sign this plan of treatment if you concur. Thank you for the opportunity to serve this patient. PROBLEM LIST (Impacting functional limitations):  1. Decreased Strength  2. Decreased ADL/Functional Activities  3. Increased Pain  4. Decreased Activity Tolerance INTERVENTIONS PLANNED: (Treatment may consist of any combination of the following)  1. Cold  2. Electrical Stimulation  3. Heat  4. Home Exercise Program (HEP)  5. Manual Therapy  6. Neuromuscular Re-education/Strengthening  7. Range of Motion (ROM)  8. Therapeutic Activites  9. Therapeutic Exercise/Strengthening  10. Ultrasound (US)     GOALS: (Goals have been discussed and agreed upon with patient.)  Short-Term Functional Goals: Time Frame: 30 days  1. Patient will be independent with home exercise program without exacerbation of symptoms or cueing needed.    2. Patient will be independent with correct sleeping positions and awareness/avoidance of aggravating positions without cueing needed. Discharge Goals: Time Frame: 90 days  1. Patient will be independent with all ADLs with minimal onset of right shoulder pain and no deficits with daily tasks. 2. Patient will report no fear avoidance with social or recreational activities due to right shoulder pain. 3. Patient will score less than or equal to 16/55 on Quick DASH with minimal effect of right shoulder pain on patient's ability to manage every day life activities. OUTCOME MEASURE:   Tool Used: Disabilities of the Arm, Shoulder and Hand (DASH) Questionnaire - Quick Version  Score:  Initial: 26/55  Most Recent: X/55 (Date: -- )   Interpretation of Score: The DASH is designed to measure the activities of daily living in person's with upper extremity dysfunction or pain. Each section is scored on a 1-5 scale, 5 representing the greatest disability. The scores of each section are added together for a total score of 55. MEDICAL NECESSITY:   · Patient is expected to demonstrate progress in strength and range of motion to improve overall mobility with daily activities. · Patient demonstrates good rehab potential due to higher previous functional level. · Skilled intervention continues to be required due to decreased functional mobility. REASON FOR SERVICES/OTHER COMMENTS:  · Patient continues to demonstrate capacity to improve overall functional mobility which will increase independence and increase safety. Total Duration:  PT Patient Time In/Time Out  Time In: 0930  Time Out: 1015    Rehabilitation Potential For Stated Goals: Good  Regarding Linda Ramer therapy, I certify that the treatment plan above will be carried out by a therapist or under their direction.   Thank you for this referral,  Israel Lau PT     Referring Physician Signature: Raghav Leon MD _______________________________ Date _____________ PAIN/SUBJECTIVE:   Initial: Pain Intensity 1: 5  Pain Location 1: Shoulder  Pain Orientation 1: Right  Pain Intervention(s) 1: Rest, Medication (see MAR)  Post Session:  5/10   HISTORY:   History of Injury/Illness (Reason for Referral):  Patient reports several years ago he was throwing a baseball around with his grandson, when he felt a pop/pain in his right arm. He reports that the shoulder would bother him on and off with different activities, but never lasted long. He reports that about 2-2.5 months ago, he started having pain in his shoulder 2-3 times per day, with occasional pain 5-6 times per day. He reports that moving it doesn't always aggravate the pain, except at end range. He reports he can be sitting and leaning on his right arm and will start to having pain in that shoulder. He reports that the MD wanted to try PT first and will then possibly do an x-ray or MRI to see if he's torn his rotator cuff. He reports he currently take Tylenol as needed for his pain. Past Medical History/Comorbidities:   Mr. Sammy Hollins  has a past medical history of CAD (coronary artery disease), CKD (chronic kidney disease), Esophageal ring, GERD (gastroesophageal reflux disease), HLD (hyperlipidemia), HTN (hypertension), Ill-defined condition, MGUS (monoclonal gammopathy of unknown significance), and Psychiatric disorder. He also has no past medical history of Aneurysm ([address] Arrhythmia, Arthritis, Asthma, Autoimmune disease ([address] Cancer ([address] Chronic kidney disease, Chronic obstructive pulmonary disease ([address] Coagulation disorder ([address] Diabetes ([address] Heart failure ([address] Liver disease, PUD (peptic ulcer disease), Rheumatic fever, Seizures ([address] Sleep apnea, Stroke ([address] Thromboembolus ([address] or Thyroid disease.   Mr. Sammy Hollins  has a past surgical history that includes hx other surgical; hx colonoscopy; pr cardiac surg procedure unlist; and hx other surgical.  Social History/Living Environment:   Home Environment: Private residence  Living Alone: No  Support Systems: Friend/Neighbor  Prior Level of Function/Work/Activity:  Independent  Dominant Side:         RIGHT  Personal Factors:          Sex:  male        Age:  78 y.o. Ambulatory/Rehab Services H2 Model Falls Risk Assessment   Risk Factors:       (1)  Gender [Male] Ability to Rise from Chair:       (0)  Ability to rise in a single movement   Falls Prevention Plan:       No modifications necessary   Total: (5 or greater = High Risk): 1   ©2010 [facility] [employer]. All Rights Reserved. [facility] Patent #[id number]. Federal Law prohibits the replication, distribution or use without written permission from [facility] [employer]   Current Medications:       Current Outpatient Medications:     buPROPion XL (WELLBUTRIN XL) 150 mg tablet, Take 1 Tablet by mouth daily. , Disp: 90 Tablet, Rfl: 1    cholecalciferol (Vitamin D3) (1000 Units /25 mcg) tablet, Take  by mouth daily. , Disp: , Rfl:     polyethylene glycol (Miralax) 17 gram/dose powder, Take 17 g by mouth daily. Every 3 days, Disp: , Rfl:     atorvastatin (LIPITOR) 80 mg tablet, Take 1 Tab by mouth daily. , Disp: 90 Tab, Rfl: 3    metoprolol succinate (TOPROL-XL) 25 mg XL tablet, Take 1 Tab by mouth daily. , Disp: 90 Tab, Rfl: 3    amLODIPine-benazepril (LOTREL) 5-20 mg per capsule, Take 1 Cap by mouth daily. , Disp: 90 Cap, Rfl: 3    clopidogreL (Plavix) 75 mg tab, Take 1 Tab by mouth daily. , Disp: 90 Tab, Rfl: 3    lansoprazole (PREVACID) 30 mg capsule, , Disp: , Rfl:     aspirin delayed-release 81 mg tablet, Take  by mouth daily. , Disp: , Rfl:     nitroglycerin (NITROSTAT) 0.4 mg SL tablet, by SubLINGual route every five (5) minutes as needed for Chest Pain.  (Patient not taking: Reported on 10/11/2021), Disp: , Rfl:    Date Last Reviewed:  10/19/2021    Number of Personal Factors/Comorbidities that affect the Plan of Care: 1-2: MODERATE COMPLEXITY   EXAMINATION:   Observation/Orthostatic Postural Assessment:          Posture Assessment: Rounded shoulders, Forward head   Palpation:          Tightness noted along posterior capsule of R UE; tightness noted along deltoid insertion and biceps muscle belly of R UE. No bruising or swelling noted. ROM:          R UE Assessment (AROM):  · Shoulder Flexion: 170 degrees  · Shoulder Extension: 20 degrees  · Shoulder Abduction: 160 degrees  · Shoulder Adduction: 0 degrees  · Shoulder Internal rotation: 70 degrees  · Shoulder External rotation: 70 degrees  · Elbow Flexion: 120 degrees  · Elbow Extension: 0 degrees  Strength:          R UE Assessment (Strength): · Shoulder Flexion: 4/5 with manual muscle testing  · Shoulder Extension: 4/5 with manual muscle testing  · Shoulder Abduction: 4/5 with manual muscle testing  · Shoulder Adduction: 4/5 with manual muscle testing  · Shoulder Internal rotation: 4/5 with manual muscle testing  · Shoulder External rotation: 4/5 with manual muscle testing  · Elbow Flexion: 4+/5 with manual muscle testing  · Elbow Extension: 4+/5 with manual muscle testing  Special Tests:          Negative  Neurological Screen:        Dermatomes: Within normal limits        Reflexes:  2+  Functional Mobility:   Gait/Mobility:    ·   Independent         Transfers:     · Sit to Stand: Independent  · Stand to Sit: Independent  · Stand Pivot Transfers: Independent  · Bed to Chair: Independent  · Lateral Transfers: Independent         Bed Mobility:     · Rolling: Independent  · Supine to Sit: Independent  · Sit to Supine: Independent  · Scooting: Independent        Body Structures Involved:  1. Joints  2. Muscles Body Functions Affected:  1. Neuromusculoskeletal  2. Movement Related Activities and Participation Affected:  1. Mobility  2.  Self Care   Number of elements (examined above) that affect the Plan of Care: 4+: HIGH COMPLEXITY   CLINICAL PRESENTATION:   Presentation: Evolving clinical presentation with changing clinical characteristics: MODERATE COMPLEXITY   CLINICAL DECISION MAKING:   Use of outcome tool(s) and clinical judgement create a POC that gives a: Questionable prediction of patient's progress: MODERATE COMPLEXITY

## 2021-10-29 ENCOUNTER — HOSPITAL ENCOUNTER (OUTPATIENT)
Dept: PHYSICAL THERAPY | Age: 76
Discharge: HOME | End: 2021-10-29
Attending: INTERNAL MEDICINE
Payer: MEDICARE

## 2021-10-29 PROCEDURE — 97110 THERAPEUTIC EXERCISES: CPT

## 2021-10-29 PROCEDURE — 97140 MANUAL THERAPY 1/> REGIONS: CPT

## 2021-10-29 NOTE — PROGRESS NOTES
Rosana Kinjal  : 1945  Primary: Ari Fish Medicare Hmo  Secondary:  [address]  at [facility]  [address], [address] [facility],[address], [address]  [phone]   [phone]        OUTPATIENT PHYSICAL THERAPY: Daily Treatment Note 10/29/2021  Visit Count:  2    ICD-10: Treatment Diagnosis:   · Pain in right shoulder (M25.511)  · Stiffness of right shoulder, not elsewhere classified (M25.611)  Precautions/Allergies:   Patient has no known allergies. TREATMENT PLAN:  Effective Dates: 10/19/2021 TO 2022 (90 days). Frequency/Duration: 2 times a week for 90 Day(s)    Pre-treatment Symptoms/Complaints:  10/29/2021: Patient reports the stretches have helped. Pain: Initial: Pain Intensity 1: 5  Pain Location 1: Shoulder  Pain Orientation 1: Right  Pain Intervention(s) 1: Rest, Medication (see MAR)  Post Session:  5/10   Medications Last Reviewed:  10/29/2021  Updated Objective Findings:  None Today  TREATMENT:     THERAPEUTIC EXERCISE: (30 minutes):  Exercises per grid below to improve mobility and strength. Required minimal visual, verbal and manual cues to promote proper body alignment, promote proper body posture and promote proper body mechanics. Progressed resistance, range, repetitions and complexity of movement as indicated.     Date:  10/29/2021   Activity/Exercise Parameters   Pulleys 10 reps  Flexion to tolerance   Wall ladder 10 reps to tolerance   UBE 6 minutes   Level 2   Theraband rows Blue t-band  15 reps   Theraband pulls Blue t-band  15 reps   Theraband internal rotation Blue t-band  15 reps  R UE   Theraband external rotation Blue t-band  15 reps  R UE   Bent over rows 8 pounds  15 reps   R UE   Standing bicep curls 8 pounds  15 reps  B UE   Pectoralis stretch in doorway 10 reps to tolerance  R UE      MANUAL THERAPY: (10 minutes): Joint mobilization, Soft tissue mobilization and PROM was utilized and necessary because of the patient's restricted joint motion, loss of articular motion and restricted motion of soft tissue. Treatment/Session Summary:    · Response to Treatment:  Patient tolerated treatment well with improving overall mobility noted after treatment. · Communication/Consultation:  None today  · Equipment provided today:  None today  · Recommendations/Intent for next treatment session: Next visit will focus on improving overall mobility and pain with daily activities.     Total Treatment Billable Duration:  40 minutes  PT Patient Time In/Time Out  Time In: 0845  Time Out: [address] [facility], PT     Visit # Therapist initials Date Arrived NS/ Cx < 24 hr >24 hr Cx Visit Comments   1  10/19/2021 X    Initial Assessment Only Today   2  10/29/2021 X                                                                                                                                                         Abbreviations: NS = No Show; CX = cancelled     Future Appointments   Date Time Provider Griselda Toscano   10/29/2021  8:45 AM LUIS ALBERTO Moses PAOLA   3/2/2022 10:00 AM Kay Wagner MD [facility]   4/11/2022 10:00 AM Robert Cook MD [facility]   4/14/2022  2:30 PM Lina [address] [place] [facility]   4/14/2022  3:00 PM Della Shaikh MD [facility]

## 2021-11-02 ENCOUNTER — HOSPITAL ENCOUNTER (OUTPATIENT)
Dept: PHYSICAL THERAPY | Age: 76
Discharge: HOME | End: 2021-11-02
Attending: INTERNAL MEDICINE
Payer: MEDICARE

## 2021-11-02 PROCEDURE — 97140 MANUAL THERAPY 1/> REGIONS: CPT

## 2021-11-02 PROCEDURE — 97110 THERAPEUTIC EXERCISES: CPT

## 2021-11-02 NOTE — PROGRESS NOTES
Eneida Colton  : 1945  Primary: Sadie Vasquez [employer] Medicare Hmo  Secondary:  [address] [place]  at [address] [place] [address],[address], [address] [facility]  [phone]   [phone]        OUTPATIENT PHYSICAL THERAPY: Daily Treatment Note 2021  Visit Count:  3    ICD-10: Treatment Diagnosis:   · Pain in right shoulder (M25.511)  · Stiffness of right shoulder, not elsewhere classified (M25.611)  Precautions/Allergies:   Patient has no known allergies. TREATMENT PLAN:  Effective Dates: 10/19/2021 TO 2022 (90 days). Frequency/Duration: 2 times a week for 90 Day(s)    Pre-treatment Symptoms/Complaints:  2021: \"I am doing ok today, the exercises are working well\"    Pain: Initial:   2 Post Session:  3/10   Medications Last Reviewed:  2021  Updated Objective Findings:  None Today  TREATMENT:     THERAPEUTIC EXERCISE: (30 minutes):  Exercises per grid below to improve mobility and strength. Required minimal visual, verbal and manual cues to promote proper body alignment, promote proper body posture and promote proper body mechanics. Progressed resistance, range, repetitions and complexity of movement as indicated.     Date:  21   Activity/Exercise Parameters   Pulleys 10 reps  Flexion to tolerance   Wall ladder 10 reps to tolerance   Supine Wand flexion X 20 reps   UBE 6 minutes   Level 2.5   Theraband rows Blue t-band  20 reps   Theraband pulls Blue t-band  20 reps   Theraband internal rotation Blue t-band  20 reps  R UE   Theraband external rotation Blue t-band  20 reps  R UE   Bent over rows 8 pounds  20 reps   R UE   Standing Extension 8 pounds   20 reps      Standing bicep curls 8 pounds  20 reps  B UE   Pectoralis stretch in doorway 10 reps to tolerance  R UE      MANUAL THERAPY: (10 minutes): Joint mobilization, Soft tissue mobilization and PROM was utilized and necessary because of the patient's restricted joint motion, loss of articular motion and restricted motion of soft tissue. Treatment/Session Summary:    · Response to Treatment:  Patient tolerated treatment well with improving overall mobility noted after treatment. · Communication/Consultation:  None today  · Equipment provided today:  None today  · Recommendations/Intent for next treatment session: Next visit will focus on improving overall mobility and pain with daily activities.     Total Treatment Billable Duration:  40 minutes  PT Patient Time In/Time Out  Time In: 0845  Time Out: [address], PTA     Visit # Therapist initials Date Arrived NS/ Cx < 24 hr >24 hr Cx Visit Comments   1  10/19/2021 X    Initial Assessment Only Today   2  10/29/2021 X       3 melchor 11-2-21 x                                                                                                                                               Abbreviations: NS = No Show; CX = cancelled     Future Appointments   Date Time Provider Griselda Toscano   11/2/2021  8:45 AM Js Duenas, PTA [facility]   11/9/2021  8:45 AM Yanick Davenport, PT [facility]   11/16/2021  1:00 PM Js Duenas, PTA [facility]   11/23/2021  8:45 AM Yanick Davenport, PT [facility]   11/30/2021  8:45 AM Js Duenas, PTA [facility]   3/2/2022 10:00 AM Ishan Hammer MD [facility]   4/11/2022 10:00 AM Maggy Stone MD [facility]   4/14/2022  2:30 PM Lina López G[facility]   4/14/2022  3:00 PM Consuelo Duarte MD [facility]

## 2021-11-09 ENCOUNTER — HOSPITAL ENCOUNTER (OUTPATIENT)
Dept: PHYSICAL THERAPY | Age: 76
Discharge: HOME | End: 2021-11-09
Attending: INTERNAL MEDICINE
Payer: MEDICARE

## 2021-11-09 NOTE — PROGRESS NOTES
Eneida Segura  : 1945  Primary: Sadie Vasquez [employer] Medicare o  Secondary:  [address]  at [address] [facility]  [address] [place] [address],[address], [place] [address].  [phone]   [phone]          OUTPATIENT DAILY NOTE    NAME/AGE/GENDER: Eneida Segura is a 78 y.o. male. DATE: 2021    Patient cancelled (less than 24 hours ago) her appointment for today due to illness. Will plan to follow up on next scheduled visit.     Claudette Saavedra, LUIS ALBERTO    Future Appointments   Date Time Provider Griselda Toscano   2021  1:00 PM Mariam Leone PTA [facility]   2021  8:45 AM LUIS ALBERTO [place] E   2021  8:45 AM ARIEL WolffORPDALTON E   3/2/2022 10:00 AM Radha Lynn MD [facility]   2022 10:00 AM Carolina Bryant MD [address] [place] [address]   2022  2:30 PM Lina López [facility]   2022  3:00 PM Marah Ordonez MD [facility]

## 2021-11-16 ENCOUNTER — HOSPITAL ENCOUNTER (OUTPATIENT)
Dept: PHYSICAL THERAPY | Age: 76
Discharge: HOME | End: 2021-11-16
Attending: INTERNAL MEDICINE
Payer: MEDICARE

## 2021-11-16 NOTE — PROGRESS NOTES
Marce Cantu  : 1945  Primary: Jeremy Fish Medicare Hmo  Secondary:  [address] [place]  at [facility]  [address] [facility], [address] [place] [address],[address], [facility] [address].  [phone]   [phone]          OUTPATIENT DAILY NOTE    NAME/AGE/GENDER: Marce Cantu is a 78 y.o. male. DATE: 2021    Patient cancelled (less than 24 hours ago) his appointment for today due to sick. Will plan to follow up on next scheduled visit.     Steph Boothe PTA    Future Appointments   Date Time Provider Griselda Toscano   2021  1:00 PM Juan Manuel Barrow PTA [facility]FE   2021  8:45 AM Frutoso Barrier, PT [facility]   2021  8:45 AM Juan Manuel Barrow PTA [facility] E   3/2/2022 10:00 AM Shanti Duque MD [facility]   2022 10:00 AM Israel Viera MD [address] [facility]   2022  2:30 PM Lina López [facility]   2022  3:00 PM Jeremy Cotton MD [facility]

## 2021-11-23 ENCOUNTER — HOSPITAL ENCOUNTER (OUTPATIENT)
Dept: PHYSICAL THERAPY | Age: 76
Discharge: HOME | End: 2021-11-23
Attending: INTERNAL MEDICINE
Payer: MEDICARE

## 2021-11-23 PROCEDURE — 97110 THERAPEUTIC EXERCISES: CPT

## 2021-11-23 NOTE — PROGRESS NOTES
Long Norris  : 1945  Primary: Elsa Cherry [employer] Medicare Hmo  Secondary:  [address] [place]  at [address], [address] [place] [address],[address], [place] [address].  [phone]   [phone]        OUTPATIENT PHYSICAL THERAPY: Daily Treatment Note 2021  Visit Count:  4    ICD-10: Treatment Diagnosis:   · Pain in right shoulder (M25.511)  · Stiffness of right shoulder, not elsewhere classified (M25.611)  Precautions/Allergies:   Patient has no known allergies. TREATMENT PLAN:  Effective Dates: 10/19/2021 TO 2022 (90 days). Frequency/Duration: 2 times a week for 90 Day(s)    Pre-treatment Symptoms/Complaints:  2021: \"I am doing ok today, the exercises are working well\" \"I still have pain with activity\"    Pain: Initial:   2 Post Session:  3/10   Medications Last Reviewed:  2021  Updated Objective Findings:  None Today  TREATMENT:     THERAPEUTIC EXERCISE: (30 minutes):  Exercises per grid below to improve mobility and strength. Required minimal visual, verbal and manual cues to promote proper body alignment, promote proper body posture and promote proper body mechanics. Progressed resistance, range, repetitions and complexity of movement as indicated.     Date:  21   Activity/Exercise Parameters   Pulleys 10 reps  Flexion to tolerance   Wall ladder 10 reps to tolerance   Supine Wand flexion X 20 reps   UBE 6 minutes   Level 2.5   Theraband rows Blue t-band  20 reps   Theraband pulls Blue t-band  20 reps   Theraband internal rotation Blue t-band  20 reps  R UE   Theraband external rotation Blue t-band  20 reps  R UE   Bent over extension X 20 reps #5   Tband Punches Green x 20 reps   Bent over rows 8 pounds  20 reps   R UE   Tband adduction Green x 20 reps   Standing Extension 8 pounds   20 reps      Standing bicep curls 8 pounds  20 reps  B UE   Pectoralis stretch in doorway 10 reps to tolerance  R UE      MANUAL THERAPY: (10 minutes): Joint mobilization, Soft tissue mobilization and PROM was utilized and necessary because of the patient's restricted joint motion, loss of articular motion and restricted motion of soft tissue. Treatment/Session Summary:    · Response to Treatment:  Patient tolerated treatment well with improving overall mobility noted after treatment. Patient continues to have discomfort in Right shoulder and down into tricep bicep region with activity. · Communication/Consultation:  None today  · Equipment provided today:  None today  · Recommendations/Intent for next treatment session: Next visit will focus on improving overall mobility and pain with daily activities.     Total Treatment Billable Duration:  40 minutes  PT Patient Time In/Time Out  Time In: 0845  Time Out: [address], PTA     Visit # Therapist initials Date Arrived NS/ Cx < 24 hr >24 hr Cx Visit Comments   1 JS 10/19/2021 X    Initial Assessment Only Today   2  10/29/2021 X       3 [facility] 11-2-21 x      x JS   sick     x rjk   sick     4 [facility] 11-23-21 x                                                                                                                    Abbreviations: NS = No Show; CX = cancelled     Future Appointments   Date Time Provider Griselda Toscano   11/23/2021  8:45 AM Michael Drivers, PTA [facility]   11/30/2021  8:45 AM Michael Drivers, PTA [facility]   3/2/2022 10:00 AM Devaughn Valles MD [facility]   4/11/2022 10:00 AM Ora Sales MD [address] [place] [address]   4/14/2022  2:30 PM Lina [address] [facility]   4/14/2022  3:00 PM Pura Arguelles MD [facility]

## 2021-11-30 ENCOUNTER — HOSPITAL ENCOUNTER (OUTPATIENT)
Dept: PHYSICAL THERAPY | Age: 76
Discharge: HOME | End: 2021-11-30
Attending: INTERNAL MEDICINE
Payer: MEDICARE

## 2021-11-30 PROCEDURE — 97110 THERAPEUTIC EXERCISES: CPT

## 2021-11-30 PROCEDURE — 97035 APP MDLTY 1+ULTRASOUND EA 15: CPT

## 2021-11-30 PROCEDURE — 97140 MANUAL THERAPY 1/> REGIONS: CPT

## 2021-11-30 NOTE — PROGRESS NOTES
Bianca Daisy  : 1945  Primary: Andrzej Fish Medicare Hmo  Secondary:  [address]  at [address], [address] [place] [address],[address], [address] [facility]  [phone]   [phone]        OUTPATIENT PHYSICAL THERAPY: Daily Treatment Note 2021  Visit Count:  5    ICD-10: Treatment Diagnosis:   · Pain in right shoulder (M25.511)  · Stiffness of right shoulder, not elsewhere classified (M25.611)  Precautions/Allergies:   Patient has no known allergies. TREATMENT PLAN:  Effective Dates: 10/19/2021 TO 2022 (90 days). Frequency/Duration: 2 times a week for 90 Day(s)    Pre-treatment Symptoms/Complaints:  2021: Pain with activity and sleeping    Pain: Initial:   2 Post Session:  3/10   Medications Last Reviewed:  2021  Updated Objective Findings:  None Today  TREATMENT:     THERAPEUTIC EXERCISE: (35 minutes):  Exercises per grid below to improve mobility and strength. Required minimal visual, verbal and manual cues to promote proper body alignment, promote proper body posture and promote proper body mechanics. Progressed resistance, range, repetitions and complexity of movement as indicated.     Date:  21   Activity/Exercise Parameters   Pulleys 10 reps  Flexion to tolerance   Wall ladder 10 reps to tolerance   Supine Wand flexion X 20 reps   UBE 6 minutes   Level 2.5   Theraband rows Black t-band  20 reps   Theraband pulls Black t-band  20 reps   Theraband internal rotation Blue t-band  20 reps  R UE   Theraband external rotation Blue t-band  20 reps  R UE   Bent over extension X 20 reps #5   Tband Punches blue x 20 reps   Bent over rows 8 pounds  20 reps   R UE   Tband adduction Green x 20 reps   Standing Extension 8 pounds   20 reps      Standing bicep curls 8 pounds  20 reps  B UE   Pectoralis stretch in doorway 10 reps to tolerance  R UE      MANUAL THERAPY: (0 minutes): Joint mobilization, Soft tissue mobilization and PROM was utilized and necessary because of the patient's restricted joint motion, loss of articular motion and restricted motion of soft tissue. MODALITIES: (8 minutes): *  Ultrasound was used today secondary to the patient having symptomatic soft tissue calcification and right shoulder. Ultrasound was used today to decrease pain increase blood flow to the area. Treatment/Session Summary:    · Response to Treatment:  Patient tolerated treatment well with improving overall mobility noted after treatment. Patient continues to have discomfort in Right shoulder and down into tricep bicep region with activity. Used Ultra sound today. · Communication/Consultation:  None today  · Equipment provided today:  None today  · Recommendations/Intent for next treatment session: Next visit will focus on improving overall mobility and pain with daily activities.     Total Treatment Billable Duration:  40 minutes  PT Patient Time In/Time Out  Time In: 0845  Time Out: [address], PTA     Visit # Therapist initials Date Arrived NS/ Cx < 24 hr >24 hr Cx Visit Comments   1  10/19/2021 X    Initial Assessment Only Today   2  10/29/2021 X       3 rjk 11-2-21 x      x JS   sick     x rjk   sick     4 rjk 11-23-21 x      5 rjk 11-30-21 x                                                                                                           Abbreviations: NS = No Show; CX = cancelled     Future Appointments   Date Time Provider Griselda Toscano   11/30/2021  8:45 AM Js Duenas, PTA [facility]   12/7/2021  8:45 AM Yanick Davenport, PT [facility]   12/14/2021  8:45 AM Js Duenas, PTA [facility]   12/21/2021  8:45 AM Gaetano Stoddard, PTA [facility]   12/28/2021  8:45 AM Js Duenas, PTA [facility]   3/2/2022 10:00 AM Ishan Hammer MD [facility]   4/11/2022 10:00 AM Maggy Stone MD [facility]   4/14/2022  2:30 PM Lina López [facility]   4/14/2022  3:00 PM Consuelo Duarte MD [facility]

## 2021-12-07 ENCOUNTER — HOSPITAL ENCOUNTER (OUTPATIENT)
Dept: PHYSICAL THERAPY | Age: 76
Discharge: HOME | End: 2021-12-07
Attending: INTERNAL MEDICINE
Payer: MEDICARE

## 2021-12-07 PROCEDURE — 97140 MANUAL THERAPY 1/> REGIONS: CPT

## 2021-12-07 PROCEDURE — 97110 THERAPEUTIC EXERCISES: CPT

## 2021-12-07 NOTE — PROGRESS NOTES
Josette Culp  : 1945  Primary: Damaris Fish Medicare Hmo  Secondary:  [address] at [address] [address],[address], [facility].  [phone]   [phone]        OUTPATIENT PHYSICAL THERAPY: Daily Treatment Note 2021  Visit Count:  6    ICD-10: Treatment Diagnosis:   · Pain in right shoulder (M25.511)  · Stiffness of right shoulder, not elsewhere classified (M25.611)  Precautions/Allergies:   Patient has no known allergies. TREATMENT PLAN:  Effective Dates: 10/19/2021 TO 2022 (90 days). Frequency/Duration: 2 times a week for 90 Day(s)    Pre-treatment Symptoms/Complaints:  2021: Patient reports the ultrasound helped for about a day, but then the pain came back. Pain: Initial:   2 Post Session:  3/10   Medications Last Reviewed:  2021  Updated Objective Findings:  None Today  TREATMENT:     THERAPEUTIC EXERCISE: (35 minutes):  Exercises per grid below to improve mobility and strength. Required minimal visual, verbal and manual cues to promote proper body alignment, promote proper body posture and promote proper body mechanics. Progressed resistance, range, repetitions and complexity of movement as indicated.     Date:  2021   Activity/Exercise Parameters   Pulleys 10 reps  Flexion to tolerance   Wall ladder 10 reps to tolerance   Supine Wand flexion X 20 reps   UBE 6 minutes   Level 2.5   Theraband rows Black t-band  20 reps   Theraband pulls Black t-band  20 reps   Theraband internal rotation Blue t-band  20 reps  R UE   Theraband external rotation Blue t-band  20 reps  R UE   Bent over extension X 20 reps #5   Tband Punches blue x 20 reps   Bent over rows 8 pounds  20 reps   R UE   Tband adduction Green x 20 reps   Standing Extension 8 pounds   20 reps      Standing bicep curls 8 pounds  20 reps  B UE   Pectoralis stretch in doorway 10 reps to tolerance  R UE      MANUAL THERAPY: (0 minutes): Joint mobilization, Soft tissue mobilization and PROM was utilized and necessary because of the patient's restricted joint motion, loss of articular motion and restricted motion of soft tissue. MODALITIES: (8 minutes): *  Ultrasound was used today secondary to the patient having symptomatic soft tissue calcification and right shoulder. Ultrasound was used today to decrease pain increase blood flow to the area. Treatment/Session Summary:    · Response to Treatment:  Patient tolerated treatment well with minimal changes. · Communication/Consultation:  None today  · Equipment provided today:  None today  · Recommendations/Intent for next treatment session: Next visit will focus on improving overall mobility and pain with daily activities.     Total Treatment Billable Duration:  40 minutes  PT Patient Time In/Time Out  Time In: 0845  Time Out: [address] [facility], PT     Visit # Therapist initials Date Arrived NS/ Cx < 24 hr >24 hr Cx Visit Comments   1  10/19/2021 X    Initial Assessment Only Today   2  10/29/2021 X       3 rjk 11-2-21 x      x JS   sick     x rjk   sick     4 rjk 11-23-21 x      5 rjk 11-30-21 x      6 JS 12/7/2021 X    Progress Note                                                                                               Abbreviations: NS = No Show; CX = cancelled     Future Appointments   Date Time Provider Griselda Islasi   12/7/2021  8:45 AM Lamont Basque, PT [facility]   12/14/2021  8:45 AM Vernida Pond, PTA [facility]   12/21/2021  8:45 AM Vernida Pond, PTA [facility]   12/28/2021  8:45 AM Vernida Pond, PTA [facility]   1/10/2022 10:15 AM Olegario Nassar MD [facility]   3/2/2022 10:00 AM Suad Mims MD [facility]   4/11/2022 10:00 AM Veena Beltran MD [facility]   4/14/2022  2:30 PM Lina López [facility]   4/14/2022  3:00 PM Ciaran Valdez MD [facility]

## 2021-12-07 NOTE — PROGRESS NOTES
Belen Pettit  : 1945  Primary: Eleazar Fish Medicare Hmo  Secondary:  [address] at [facility]  [address] [facility], [address] [place] [address],[address], [facility].  [phone]   [phone]         OUTPATIENT PHYSICAL THERAPY:Progress Report 2021   ICD-10: Treatment Diagnosis:   · Pain in right shoulder (M25.511)  · Stiffness of right shoulder, not elsewhere classified (M25.611)  Precautions/Allergies:   Patient has no known allergies. TREATMENT PLAN:  Effective Dates: 10/19/2021 TO 2022 (90 days). Frequency/Duration: 2 times a week for 90 Day(s) MEDICAL/REFERRING DIAGNOSIS:  RT SHOULDER PAIN   DATE OF ONSET: Chronic  REFERRING PHYSICIAN: Franklin Phillips MD MD Orders: Evaluate and Treat  Return MD Appointment: TBD     ASSESSMENT:  Mr. Ivon Courtney presents with decreased mobility, decreased strength, and pain in right shoulder secondary to degenerative changes. Patient has attended a total of 6 scheduled physical therapy visits including initial evaluation on 10/19/2021. Treatment has consisted of mobility and strengthening activities to improve overall pain and performance with activities of daily living. Patient is making steady progress with all aspects of therapy. PROBLEM LIST (Impacting functional limitations):  1. Decreased Strength  2. Decreased ADL/Functional Activities  3. Increased Pain  4. Decreased Activity Tolerance INTERVENTIONS PLANNED: (Treatment may consist of any combination of the following)  1. Cold  2. Electrical Stimulation  3. Heat  4. Home Exercise Program (HEP)  5. Manual Therapy  6. Neuromuscular Re-education/Strengthening  7. Range of Motion (ROM)  8. Therapeutic Activites  9. Therapeutic Exercise/Strengthening  10. Ultrasound (US)     GOALS: (Goals have been discussed and agreed upon with patient.)  Short-Term Functional Goals: Time Frame: 30 days  1.  Patient will be independent with home exercise program without exacerbation of symptoms or cueing needed--goal met. 2. Patient will be independent with correct sleeping positions and awareness/avoidance of aggravating positions without cueing needed--goal met. Discharge Goals: Time Frame: 90 days  1. Patient will be independent with all ADLs with minimal onset of right shoulder pain and no deficits with daily tasks--goal ongoing. 2. Patient will report no fear avoidance with social or recreational activities due to right shoulder pain --goal ongoing. 3. Patient will score less than or equal to 16/55 on Quick DASH with minimal effect of right shoulder pain on patient's ability to manage every day life activities--goal ongoing. OUTCOME MEASURE:   Tool Used: Disabilities of the Arm, Shoulder and Hand (DASH) Questionnaire - Quick Version  Score:  Initial: 26/55  Most Recent: 22/55 (Date: 12/7/2021 )   Interpretation of Score: The DASH is designed to measure the activities of daily living in person's with upper extremity dysfunction or pain. Each section is scored on a 1-5 scale, 5 representing the greatest disability. The scores of each section are added together for a total score of 55. MEDICAL NECESSITY:   · Patient is expected to demonstrate progress in strength and range of motion to improve overall mobility with daily activities. · Patient demonstrates good rehab potential due to higher previous functional level. · Skilled intervention continues to be required due to decreased functional mobility. REASON FOR SERVICES/OTHER COMMENTS:  · Patient continues to demonstrate capacity to improve overall functional mobility which will increase independence and increase safety.   Total Duration:  PT Patient Time In/Time Out  Time In: 0845  Time Out: 0930    Rehabilitation Potential For Stated Goals: Good     PAIN/SUBJECTIVE:   Initial:    Post Session:  5/10   HISTORY:   History of Injury/Illness (Reason for Referral):  Patient reports several years ago he was throwing a baseball around with his grandson, when he felt a pop/pain in his right arm. He reports that the shoulder would bother him on and off with different activities, but never lasted long. He reports that about 2-2.5 months ago, he started having pain in his shoulder 2-3 times per day, with occasional pain 5-6 times per day. He reports that moving it doesn't always aggravate the pain, except at end range. He reports he can be sitting and leaning on his right arm and will start to having pain in that shoulder. He reports that the MD wanted to try PT first and will then possibly do an x-ray or MRI to see if he's torn his rotator cuff. He reports he currently take Tylenol as needed for his pain. 12/7/2021 (Progress Note): Patient reports his arm is about the same. He reports he has some relief with therapy, but most of the pain is still there in his arm. Past Medical History/Comorbidities:   Mr. Aníbal Clinton  has a past medical history of CAD (coronary artery disease), CKD (chronic kidney disease), Esophageal ring, GERD (gastroesophageal reflux disease), HLD (hyperlipidemia), HTN (hypertension), Ill-defined condition, MGUS (monoclonal gammopathy of unknown significance), and Psychiatric disorder. He also has no past medical history of Aneurysm ([address] Arrhythmia, Arthritis, Asthma, Autoimmune disease ([address] Cancer ([address] Chronic kidney disease, Chronic obstructive pulmonary disease ([address] Coagulation disorder ([address] Diabetes ([address] Heart failure ([address] Liver disease, PUD (peptic ulcer disease), Rheumatic fever, Seizures ([address] Sleep apnea, Stroke ([address] Thromboembolus ([address] or Thyroid disease.   Mr. Aníbal Clinton  has a past surgical history that includes hx other surgical; hx colonoscopy; pr cardiac surg procedure unlist; and hx other surgical.  Social History/Living Environment:   Home Environment: Private residence  Living Alone: No  Support Systems: Friend/Neighbor  Prior Level of Function/Work/Activity:  Independent  Dominant Side:         RIGHT  Personal Factors:          Sex:  male        Age:  78 y.o. Current Medications:       Current Outpatient Medications:     buPROPion XL (WELLBUTRIN XL) 150 mg tablet, Take 1 Tablet by mouth daily. , Disp: 90 Tablet, Rfl: 1    cholecalciferol (Vitamin D3) (1000 Units /25 mcg) tablet, Take  by mouth daily. , Disp: , Rfl:     polyethylene glycol (Miralax) 17 gram/dose powder, Take 17 g by mouth daily. Every 3 days, Disp: , Rfl:     atorvastatin (LIPITOR) 80 mg tablet, Take 1 Tab by mouth daily. , Disp: 90 Tab, Rfl: 3    metoprolol succinate (TOPROL-XL) 25 mg XL tablet, Take 1 Tab by mouth daily. , Disp: 90 Tab, Rfl: 3    amLODIPine-benazepril (LOTREL) 5-20 mg per capsule, Take 1 Cap by mouth daily. , Disp: 90 Cap, Rfl: 3    clopidogreL (Plavix) 75 mg tab, Take 1 Tab by mouth daily. , Disp: 90 Tab, Rfl: 3    lansoprazole (PREVACID) 30 mg capsule, , Disp: , Rfl:     aspirin delayed-release 81 mg tablet, Take  by mouth daily. , Disp: , Rfl:     nitroglycerin (NITROSTAT) 0.4 mg SL tablet, by SubLINGual route every five (5) minutes as needed for Chest Pain. (Patient not taking: Reported on 10/11/2021), Disp: , Rfl:    Date Last Reviewed:  12/7/2021    EXAMINATION:   Observation/Orthostatic Postural Assessment:          Posture Assessment: Rounded shoulders, Forward head   Palpation:          Tightness noted along posterior capsule of R UE; tightness noted along deltoid insertion and biceps muscle belly of R UE. No bruising or swelling noted. ROM:          R UE Assessment (AROM):  · Shoulder Flexion: 170 degrees  · Shoulder Extension: 20 degrees  · Shoulder Abduction: 160 degrees  · Shoulder Adduction: 0 degrees  · Shoulder Internal rotation: 70 degrees  · Shoulder External rotation: 70 degrees  · Elbow Flexion: 120 degrees  · Elbow Extension: 0 degrees  Strength:          R UE Assessment (Strength):   · Shoulder Flexion: 4/5 with manual muscle testing  · Shoulder Extension: 4/5 with manual muscle testing  · Shoulder Abduction: 4/5 with manual muscle testing  · Shoulder Adduction: 4/5 with manual muscle testing  · Shoulder Internal rotation: 4/5 with manual muscle testing  · Shoulder External rotation: 4/5 with manual muscle testing  · Elbow Flexion: 4+/5 with manual muscle testing  · Elbow Extension: 4+/5 with manual muscle testing  Special Tests:          Negative  Neurological Screen:        Dermatomes:   Within normal limits        Reflexes:  2+  Functional Mobility:   Gait/Mobility:    ·   Independent         Transfers:     · Sit to Stand: Independent  · Stand to Sit: Independent  · Stand Pivot Transfers: Independent  · Bed to Chair: Independent  · Lateral Transfers: Independent         Bed Mobility:     · Rolling: Independent  · Supine to Sit: Independent  · Sit to Supine: Independent  · Scooting: Independent

## 2021-12-14 ENCOUNTER — APPOINTMENT (OUTPATIENT)
Dept: PHYSICAL THERAPY | Age: 76
End: 2021-12-14
Attending: INTERNAL MEDICINE
Payer: MEDICARE

## 2021-12-21 ENCOUNTER — HOSPITAL ENCOUNTER (OUTPATIENT)
Dept: PHYSICAL THERAPY | Age: 76
End: 2021-12-21
Attending: INTERNAL MEDICINE
Payer: MEDICARE

## 2021-12-28 ENCOUNTER — APPOINTMENT (OUTPATIENT)
Dept: PHYSICAL THERAPY | Age: 76
End: 2021-12-28
Attending: INTERNAL MEDICINE
Payer: MEDICARE

## 2022-02-03 NOTE — ED PROVIDER NOTES
Addended by: TONIO LEDBETTER on: 2/3/2022 12:40 PM     Modules accepted: Orders     HPI Comments: Head patient had 2 stents placed in his heart in September. Has been doing well since then. Has had a couple of episodes of vasovagal syncope prior to today. Was doing an extensive workout at the [facility] rehabilitation [facility]. Afterwards he felt lightheaded and weak. He then passed out in a chair. Did not hit his head. Denies any prior chest pain or post chest pain. Was told by them and cardiology to come for evaluation. Feels better now with rest.  Had some shortness of breath after the workout but none now. Patient is a 73 y.o. male presenting with syncope. The history is provided by the patient. No  was used. Syncope    This is a new problem. The current episode started 3 to 5 hours ago. The problem has been resolved. He lost consciousness for a period of 1 to 5 minutes. The problem is associated with exertion. Associated symptoms include confusion and light-headedness. Pertinent negatives include no chest pain, no palpitations, no clumsiness, no fever, no abdominal pain, no bowel incontinence, no nausea, no vomiting, no bladder incontinence, no congestion, no headaches, no back pain, no focal weakness, no weakness and no head injury. He has tried nothing for the symptoms. His past medical history is significant for CAD, HTN and syncope.         Past Medical History:   Diagnosis Date    CAD (coronary artery disease)     GERD (gastroesophageal reflux disease)     HLD (hyperlipidemia)     HTN (hypertension)     Ill-defined condition      VASOVAGAL SYNCOPE episode x 3    Psychiatric disorder      depression       Past Surgical History:   Procedure Laterality Date    Hx other surgical       EGD/colonoscopy & esophagus dilation         Family History:   Problem Relation Age of Onset    Hypertension Mother     Heart Disease Mother     Stroke Mother     Diabetes Brother     Cancer Neg Hx        Social History     Social History    Marital status: SINGLE Spouse name: N/A    Number of children: N/A    Years of education: N/A     Occupational History    Not on file. Social History Main Topics    Smoking status: Former Smoker     Packs/day: 1.00     Years: 35.00     Types: Cigarettes     Quit date: 8/2/1997    Smokeless tobacco: Never Used    Alcohol use 0.0 oz/week     0 Standard drinks or equivalent per week      Comment: occasionally - rare    Drug use: No    Sexual activity: Not on file     Other Topics Concern    Not on file     Social History Narrative         ALLERGIES: Review of patient's allergies indicates no known allergies. Review of Systems   Constitutional: Negative for chills and fever. HENT: Negative for congestion, rhinorrhea and sore throat. Eyes: Negative for pain and redness. Respiratory: Negative for chest tightness, shortness of breath and wheezing. Cardiovascular: Positive for syncope. Negative for chest pain, palpitations and leg swelling. Gastrointestinal: Negative for abdominal pain, bowel incontinence, diarrhea, nausea and vomiting. Genitourinary: Negative for bladder incontinence, dysuria and hematuria. Musculoskeletal: Negative for back pain, gait problem, neck pain and neck stiffness. Skin: Negative for color change and rash. Neurological: Positive for syncope and light-headedness. Negative for focal weakness, weakness, numbness and headaches. Psychiatric/Behavioral: Positive for confusion. Vitals:    01/25/17 1337   BP: 125/70   Pulse: (!) 55   Resp: 16   Temp: 98.6 °F (37 °C)   SpO2: 98%   Weight: 74.9 kg (165 lb 3.2 oz)   Height: 5' 11\" (1.803 m)            Physical Exam   Constitutional: He is oriented to person, place, and time. He appears well-developed and well-nourished. No distress. HENT:   Head: Normocephalic and atraumatic. Neck: Normal range of motion. Neck supple. Cardiovascular: Normal rate and regular rhythm.     Pulmonary/Chest: Effort normal and breath sounds normal. He has no wheezes. Abdominal: Soft. Bowel sounds are normal. There is no tenderness. Musculoskeletal: Normal range of motion. He exhibits no edema. Neurological: He is alert and oriented to person, place, and time. Skin: Skin is warm and dry. Nursing note and vitals reviewed. MDM  Number of Diagnoses or Management Options  Syncope and collapse:   Diagnosis management comments: Syncope after workout. Feels better now. No chest pain. Will consult cardiology due to recent stents in September. Will dispo with them. Amount and/or Complexity of Data Reviewed  Clinical lab tests: ordered and reviewed  Tests in the radiology section of CPT®: ordered and reviewed  Tests in the medicine section of CPT®: ordered and reviewed    Patient Progress  Patient progress: stable    ED Course       Procedures      EKG: normal sinus rhythm, nonspecific ST and T waves changes. Rate 52. XR CHEST PORT (Final result) Result time: 01/25/17 17:22:04     Final result by Dora Gilliam MD (01/25/17 17:22:04)     Impression:     Impression:  No acute pathology identified.       Narrative:     AP chest radiograph    History: syncope, 73 years Male Reports was a cardiac rehab and began to feel  dizzy and then states he passed out.   HX: Heart stent x2(Sept. 2016    Comparison: None available    Findings:   Normal cardiomediastinal silhouette.   Mild subsegmental atelectasis  bilateral lung bases.  No evidence of pneumothorax, pleural effusion, or air  space consolidation.  There are multiple chronic healed fracture deformities of  the left upper posterior ribs.  Visualized soft tissue and osseous structures  otherwise unremarkable.              Results Include:    Recent Results (from the past 24 hour(s))   EKG, 12 LEAD, INITIAL    Collection Time: 01/25/17  1:39 PM   Result Value Ref Range    Systolic BP  mmHg    Diastolic BP  mmHg    Ventricular Rate 52 BPM    Atrial Rate 52 BPM    P-R Interval 146 ms    QRS Duration 72 ms Q-T Interval 440 ms    QTC Calculation (Bezet) 409 ms    Calculated P Axis 64 degrees    Calculated R Axis 15 degrees    Calculated T Axis -9 degrees    Diagnosis       Sinus bradycardia  Otherwise normal ECG  When compared with ECG of 13-SEP-2016 08:57,  T wave inversion now evident in Inferior leads  Confirmed by GIUSEPPE GOLDBERG (), OLIVIA GUEVARA ([id number]) on 1/25/2017 4:46:42 PM     CBC WITH AUTOMATED DIFF    Collection Time: 01/25/17  1:40 PM   Result Value Ref Range    WBC 14.4 (H) 4.3 - 11.1 K/uL    RBC 4.30 4.23 - 5.67 M/uL    HGB 13.1 (L) 13.6 - 17.2 g/dL    HCT 39.4 (L) 41.1 - 50.3 %    MCV 91.6 79.6 - 97.8 FL    MCH 30.5 26.1 - 32.9 PG    MCHC 33.2 31.4 - 35.0 g/dL    RDW 12.9 11.9 - 14.6 %    PLATELET 441 695 - 518 K/uL    MPV 11.1 10.8 - 14.1 FL    DF AUTOMATED      NEUTROPHILS 77 43 - 78 %    LYMPHOCYTES 16 13 - 44 %    MONOCYTES 4 4.0 - 12.0 %    EOSINOPHILS 3 0.5 - 7.8 %    BASOPHILS 0 0.0 - 2.0 %    IMMATURE GRANULOCYTES 0.2 0.0 - 5.0 %    ABS. NEUTROPHILS 11.0 (H) 1.7 - 8.2 K/UL    ABS. LYMPHOCYTES 2.3 0.5 - 4.6 K/UL    ABS. MONOCYTES 0.6 0.1 - 1.3 K/UL    ABS. EOSINOPHILS 0.4 0.0 - 0.8 K/UL    ABS. BASOPHILS 0.0 0.0 - 0.2 K/UL    ABS. IMM. GRANS. 0.0 0.0 - 0.5 K/UL   METABOLIC PANEL, COMPREHENSIVE    Collection Time: 01/25/17  1:40 PM   Result Value Ref Range    Sodium 140 136 - 145 mmol/L    Potassium 4.5 3.5 - 5.1 mmol/L    Chloride 105 98 - 107 mmol/L    CO2 28 21 - 32 mmol/L    Anion gap 7 7 - 16 mmol/L    Glucose 99 65 - 100 mg/dL    BUN 16 8 - 23 MG/DL    Creatinine 1.56 (H) 0.8 - 1.5 MG/DL    GFR est AA 57 (L) >60 ml/min/1.73m2    GFR est non-AA 47 (L) >60 ml/min/1.73m2    Calcium 8.6 8.3 - 10.4 MG/DL    Bilirubin, total 0.5 0.2 - 1.1 MG/DL    ALT 26 12 - 65 U/L    AST 21 15 - 37 U/L    Alk.  phosphatase 71 50 - 136 U/L    Protein, total 7.0 6.3 - 8.2 g/dL    Albumin 3.5 3.2 - 4.6 g/dL    Globulin 3.5 2.3 - 3.5 g/dL    A-G Ratio 1.0 (L) 1.2 - 3.5     TROPONIN I    Collection Time: 01/25/17  1:40 PM Result Value Ref Range    Troponin-I, Qt. <0.02 (L) 0.02 - 0.05 NG/ML   BNP    Collection Time: 01/25/17  1:40 PM   Result Value Ref Range    BNP 52 pg/mL   MAGNESIUM    Collection Time: 01/25/17  5:42 PM   Result Value Ref Range    Magnesium 2.3 1.8 - 2.4 mg/dL   TROPONIN I    Collection Time: 01/25/17  5:42 PM   Result Value Ref Range    Troponin-I, Qt. <0.02 (L) 0.02 - 0.05 NG/ML

## 2022-02-04 NOTE — THERAPY DISCHARGE
Tessa Butler  : 1945  Primary: Manjit Fish Medicare Hmo  Secondary:  [address]  [facility] [address] [address], [facility].  [phone]   [phone]         OUTPATIENT PHYSICAL THERAPY:Discontinuation Summary    ICD-10: Treatment Diagnosis:   · Pain in right shoulder (M25.511)  · Stiffness of right shoulder, not elsewhere classified (M25.611)  Precautions/Allergies:   Patient has no known allergies. MEDICAL/REFERRING DIAGNOSIS:  RT SHOULDER PAIN   DATE OF ONSET: Chronic  REFERRING PHYSICIAN: Marley Blair MD MD Orders: Evaluate and Treat  Return MD Appointment: TBD     ASSESSMENT:  Mr. Emelyn Ayoub presented with improving mobility, strength, and pain in right shoulder secondary to degenerative changes. Patient attended a total of 6 scheduled physical therapy visits including initial evaluation on 10/19/2021. Treatment consisted of mobility and strengthening activities to improve overall pain and performance with activities of daily living. Patient made steady progress with all aspects of therapy. Patient did not attend any additional physical therapy visits secondary to unknown reaons. Patient's therapy will be discontinued at this time. We will be happy to re-assess him with a change in his status and a new order from his doctor. Thank you for the opportunity to serve this patient. GOALS: (Goals have been discussed and agreed upon with patient.)  Short-Term Functional Goals: Time Frame: 30 days  1. Patient will be independent with home exercise program without exacerbation of symptoms or cueing needed--goal met. 2. Patient will be independent with correct sleeping positions and awareness/avoidance of aggravating positions without cueing needed--goal met. Discharge Goals: Time Frame: 90 days  1.  Patient will be independent with all ADLs with minimal onset of right shoulder pain and no deficits with daily tasks--goal unmet.  2. Patient will report no fear avoidance with social or recreational activities due to right shoulder pain --goal unmet. 3. Patient will score less than or equal to 16/55 on Quick DASH with minimal effect of right shoulder pain on patient's ability to manage every day life activities--goal unmet. OUTCOME MEASURE:   Tool Used: Disabilities of the Arm, Shoulder and Hand (DASH) Questionnaire - Quick Version  Score:  Initial: 26/55  Most Recent: 22/55 (Date: 12/7/2021 )   Interpretation of Score: The DASH is designed to measure the activities of daily living in person's with upper extremity dysfunction or pain. Each section is scored on a 1-5 scale, 5 representing the greatest disability. The scores of each section are added together for a total score of 55. EXAMINATION:   Observation/Orthostatic Postural Assessment:          Posture Assessment: Rounded shoulders, Forward head   Palpation:          Tightness noted along posterior capsule of R UE; tightness noted along deltoid insertion and biceps muscle belly of R UE. No bruising or swelling noted. ROM:          R UE Assessment (AROM):  · Shoulder Flexion: 170 degrees  · Shoulder Extension: 20 degrees  · Shoulder Abduction: 160 degrees  · Shoulder Adduction: 0 degrees  · Shoulder Internal rotation: 70 degrees  · Shoulder External rotation: 70 degrees  · Elbow Flexion: 120 degrees  · Elbow Extension: 0 degrees  Strength:          R UE Assessment (Strength):   · Shoulder Flexion: 4/5 with manual muscle testing  · Shoulder Extension: 4/5 with manual muscle testing  · Shoulder Abduction: 4/5 with manual muscle testing  · Shoulder Adduction: 4/5 with manual muscle testing  · Shoulder Internal rotation: 4/5 with manual muscle testing  · Shoulder External rotation: 4/5 with manual muscle testing  · Elbow Flexion: 4+/5 with manual muscle testing  · Elbow Extension: 4+/5 with manual muscle testing  Special Tests:          Negative  Neurological Screen: Dermatomes:   Within normal limits        Reflexes:  2+  Functional Mobility:   Gait/Mobility:    ·   Independent         Transfers:     · Sit to Stand: Independent  · Stand to Sit: Independent  · Stand Pivot Transfers: Independent  · Bed to Chair: Independent  · Lateral Transfers: Independent         Bed Mobility:     · Rolling: Independent  · Supine to Sit: Independent  · Sit to Supine: Independent  · Scooting: Independent

## 2022-02-16 ENCOUNTER — HOSPITAL ENCOUNTER (OUTPATIENT)
Dept: PHYSICAL THERAPY | Age: 77
Discharge: HOME | End: 2022-02-16
Attending: ORTHOPAEDIC SURGERY
Payer: MEDICARE

## 2022-02-16 DIAGNOSIS — M25.511 RIGHT SHOULDER PAIN, UNSPECIFIED CHRONICITY: ICD-10-CM

## 2022-02-16 PROCEDURE — 97162 PT EVAL MOD COMPLEX 30 MIN: CPT

## 2022-02-16 NOTE — THERAPY EVALUATION
Pj Headings  : 1945  Primary: Boby Fish Medicare Hmo  Secondary:  [address] at [place] [id number] [facility], [address] [place] [address],[address], [place] [address].  [phone]   [phone]           OUTPATIENT PHYSICAL THERAPY:Initial Assessment 2022   ICD-10: Treatment Diagnosis: Pain in right shoulder (M25.511)  Precautions/Allergies: y  Patient has no known allergies. TREATMENT PLAN:  Effective Dates: 2022 TO 5/15/2022 (90 days). Frequency/Duration: 2 times a week for 90 Day(s) MEDICAL/REFERRING DIAGNOSIS:  Right shoulder pain, unspecified chronicity [M25.511]   DATE OF ONSET: 2+ years ago  REFERRING PHYSICIAN: Amarilys Alcala MD MD Orders: Evaluate and Treat  Return MD Appointment: 22     INITIAL ASSESSMENT:  Mr. Lauren Eddy presents with decreased R shoulder ROM, decreased R UE strength and increased pain leading to decreased functional status. Pt would benefit from skilled physical therapy services to address the above deficits and help patient return to prior level of function. PROBLEM LIST (Impacting functional limitations):  1. Decreased Strength  2. Decreased ADL/Functional Activities  3. Increased Pain  4. Decreased Flexibility/Joint Mobility  5. Decreased [place] with Home Exercise Program INTERVENTIONS PLANNED: (Treatment may consist of any combination of the following)  1. Cold  2. Cryotherapy  3. Electrical Stimulation  4. Heat  5. Home Exercise Program (HEP)  6. Manual Therapy  7. Range of Motion (ROM)  8. Therapeutic Exercise/Strengthening  9. Ultrasound (US)     GOALS: (Goals have been discussed and agreed upon with patient.)  Short-Term Functional Goals: Time Frame: 4 weeks  1. Pt will increase ROM R shoulder flexion = 150 degrees, abduction = 150 degrees, internal rotation = 80 degrees, external rotation = 80 degrees to assist with daily activities to assist with daily activities  2.   Pt will increase strength R shoulder 4/5 to assist with daily activities  3. Pt will be independent with HEP  Discharge Goals: Time Frame: 12 weeks  1. Pt will increase ROM R shoulder flexion = 170 degrees, abduction = 170 degrees to assist with daily activities  2. Pt will increase strength R shoulder 4+/5 to assist with daily activities  3. Pt will perform 20 minutes household cleaning activities independently with min to no c/o R shoulder pain    OUTCOME MEASURE:   Tool Used: Disabilities of the Arm, Shoulder and Hand (DASH) Questionnaire - Quick Version  Score:  Initial: 21/55  Most Recent: X/55 (Date: -- )   Interpretation of Score: The DASH is designed to measure the activities of daily living in person's with upper extremity dysfunction or pain. Each section is scored on a 1-5 scale, 5 representing the greatest disability. The scores of each section are added together for a total score of 55. MEDICAL NECESSITY:   · Patient is expected to demonstrate progress in strength, range of motion and functional technique to increase independence with daily activities. · Patient demonstrates good rehab potential due to higher previous functional level. REASON FOR SERVICES/OTHER COMMENTS:  · Patient continues to require skilled intervention due to decreased ROM/strength R shoulder with increased pain leading to decreased functional status. Total Duration:  PT Patient Time In/Time Out  Time In: 1015  Time Out: 1100    Rehabilitation Potential For Stated Goals: Good  Regarding Nata Tomeka therapy, I certify that the treatment plan above will be carried out by a therapist or under their direction.   Thank you for this referral,  Yeny Billings, PT     Referring Physician Signature: Debbie Ford MD _______________________________ Date _____________      PAIN/SUBJECTIVE:   Initial:   1-2/10 Post Session:  1-2/10   HISTORY:   History of Injury/Illness (Reason for Referral):  Pt reports 2+ years ago he was throwing a ball to his grandson and he felt \"something go\" in his R shoulder. He states the shoulder has continued to bother him the past 2 years with gradual worsening of symptoms. He states the pain wakes him up at night and prevents him from sleeping. He states the pain radiates into his R UE at times down to the hand. He denies any UE numbness/tingling. He denies any popping/crepitus in the shoulder. Pt reports difficulties reaching across his body to wash his L shoulder and arm and performing household cleaning activities. He had x-rays of the R shoulder which were normal per patient. He had a cortisone injection 02-08-22 with significant decrease in his symptoms. Pt had 5 sessions of physical therapy the end of last year with no relief of his symptoms. Pt states he would like to return to golfing with his grandson. Pt rates his current R shoulder pain 1-2/10 sitting at rest.   Past Medical History/Comorbidities:   Mr. Tracy Pizano  has a past medical history of CAD (coronary artery disease), CKD (chronic kidney disease), Esophageal ring, GERD (gastroesophageal reflux disease), HLD (hyperlipidemia), HTN (hypertension), Ill-defined condition, MGUS (monoclonal gammopathy of unknown significance), and Psychiatric disorder. He has no past medical history of Aneurysm ([address] Arrhythmia, Arthritis, Asthma, Autoimmune disease ([address] Cancer ([address] Chronic kidney disease, Chronic obstructive pulmonary disease ([address] Coagulation disorder ([address] Diabetes ([address] Heart failure ([address] Liver disease, PUD (peptic ulcer disease), Rheumatic fever, Seizures ([address] Sleep apnea, Stroke ([address] Thromboembolus ([address] or Thyroid disease.   Mr. Tracy Pizano  has a past surgical history that includes hx other surgical; hx colonoscopy; pr cardiac surg procedure unlist; and hx other surgical.  Social History/Living Environment:     Pt is currently having increased pain with household cleaning activities  Prior Level of Function/Work/Activity:  Pt is retired  Dominant Side: RIGHT  Other Clinical Tests:          X-rays R shoulder negative per patient  Personal Factors:          Sex:  male        Age:  78 y.o. Profession:  Pt is retired   Ambulatory/Rehab Services H2 Model Falls Risk Assessment   Risk Factors:       (1)  Gender [Male] Ability to Rise from Chair:       (0)  Ability to rise in a single movement   Falls Prevention Plan:       No modifications necessary   Total: (5 or greater = High Risk): 1   ©2010 [facility] States Patent #[id number]. Federal Law prohibits the replication, distribution or use without written permission from [facility] [employer]   Current Medications:       Current Outpatient Medications:     atorvastatin (LIPITOR) 80 mg tablet, TAKE 1 TABLET BY MOUTH DAILY, Disp: 90 Tablet, Rfl: 3    buPROPion XL (WELLBUTRIN XL) 150 mg tablet, Take 1 Tablet by mouth daily. , Disp: 90 Tablet, Rfl: 1    cholecalciferol (Vitamin D3) (1000 Units /25 mcg) tablet, Take  by mouth daily. , Disp: , Rfl:     polyethylene glycol (Miralax) 17 gram/dose powder, Take 17 g by mouth daily. Every 3 days, Disp: , Rfl:     metoprolol succinate (TOPROL-XL) 25 mg XL tablet, Take 1 Tab by mouth daily. , Disp: 90 Tab, Rfl: 3    amLODIPine-benazepril (LOTREL) 5-20 mg per capsule, Take 1 Cap by mouth daily. , Disp: 90 Cap, Rfl: 3    lansoprazole (PREVACID) 30 mg capsule, , Disp: , Rfl:     aspirin delayed-release 81 mg tablet, Take  by mouth daily. , Disp: , Rfl:     nitroglycerin (NITROSTAT) 0.4 mg SL tablet, by SubLINGual route every five (5) minutes as needed for Chest Pain., Disp: , Rfl:    Date Last Reviewed:  02-16-22   Number of Personal Factors/Comorbidities that affect the Plan of Care: 1-2: MODERATE COMPLEXITY   EXAMINATION:   Observation/Orthostatic Postural Assessment:           Forward head, rounded shoulders  Palpation:          No tenderness noted to palpation R shoulder/UE  ROM:    R shoulder flexion = 130 degrees  R shoulder abduction = 130 degrees  R shoulder internal rotation = 60 degrees  R shoulder external rotation = 60 degrees    L shoulder flexion = 170 degrees  L shoulder abduction = 170 degrees  L shoulder internal rotation = 80 degrees  L shoulder external rotation = 80 degrees    Strength:    R shoulder flexion = 4-/5  R shoulder abduction = 4-/5  R shoulder internal rotation = 4/5  R shoulder external rotation = 4/5    L shoulder flexion = 5/5  L shoulder abduction = 5/5  L shoulder internal rotation = 5/5  L shoulder external rotation = 5/5    Special Tests:          Hawkin's Renny mildly aggravates R shoulder  Neurological Screen:        Sensation: Intact to light touch R UE  Functional Mobility:         Transfers:  Pt able to transition sit to supine and supine to sit independently     Body Structures Involved:  1. Bones  2. Joints  3. Muscles Body Functions Affected:  1. Sensory/Pain  2. Neuromusculoskeletal Activities and Participation Affected:  1. Mobility  2.  Domestic Life   Number of elements (examined above) that affect the Plan of Care: 3: MODERATE COMPLEXITY   CLINICAL PRESENTATION:   Presentation: Evolving clinical presentation with changing clinical characteristics: MODERATE COMPLEXITY   CLINICAL DECISION MAKING:   Use of outcome tool(s) and clinical judgement create a POC that gives a: Questionable prediction of patient's progress: MODERATE COMPLEXITY

## 2022-02-21 ENCOUNTER — HOSPITAL ENCOUNTER (OUTPATIENT)
Dept: PHYSICAL THERAPY | Age: 77
Discharge: HOME | End: 2022-02-21
Attending: ORTHOPAEDIC SURGERY
Payer: MEDICARE

## 2022-02-21 PROCEDURE — 97140 MANUAL THERAPY 1/> REGIONS: CPT

## 2022-02-21 PROCEDURE — 97110 THERAPEUTIC EXERCISES: CPT

## 2022-02-21 NOTE — PROGRESS NOTES
Pj Headings  : 1945  Primary: Boby Fish Medicare Hmo  Secondary:  [address]  at [place] [id number] [facility], [address] [place] [address],[address], [facility] U 91.  [phone]   [phone]         OUTPATIENT PHYSICAL THERAPY: Daily Treatment Note 2022  Visit Count:  2    ICD-10: Treatment Diagnosis: Pain in right shoulder (M25.511)  Precautions/Allergies: y  Patient has no known allergies. TREATMENT PLAN:  Effective Dates: 2022 TO 5/15/2022 (90 days). Frequency/Duration: 2 times a week for 90 Day(s)    Pre-treatment Symptoms/Complaints:  Pt states his pain has been less frequent since his last session. He states he is sleeping better also and  the pain is more mild when he gets it. Pain: Initial:   2/10 Post Session:  2/10   Medications Last Reviewed:  2022  Updated Objective Findings:  None Today  TREATMENT:     THERAPEUTIC EXERCISE: (25 minutes):  Exercises per grid below to improve mobility and strength. Required minimal verbal cues to promote proper body alignment and promote proper body posture. Progressed resistance and repetitions as indicated. MANUAL THERAPY: (15 minutes): PROM to R shoulder into flexion, abduction, internal rotation and external rotation was utilized and necessary because of the patient's restricted joint motion. Date:  22 Date:   Date:     Activity/Exercise Parameters Parameters Parameters   Shoulder Pulley into Flexion 20 reps  5 sec holds     Wall Walks 10 reps  5 sec holds     UBE Manual L2  6 minutes     Theraband Rows Red T-band  20 reps     Theraband Straight Arm Pulldowns Red T-band  20 reps     Wand Flexion in Supine 10 reps  5 sec holds     Wand External Rotation in Supine 10 reps  5 sec holds         [facility]  Treatment/Session Summary:    · Response to Treatment:  Pt tolerated all treatments well today with no c/o. Increased ROM R shoulder today .   · Communication/Consultation:  None today  · Equipment provided today:  None today  · Recommendations/Intent for next treatment session: Next visit will focus on progression of ROM/strengthening exercises as tolerable.     Total Treatment Billable Duration:  40 minutes  PT Patient Time In/Time Out  Time In: 9928  Time Out: [address] [facility] Danyel Bijal, PT   Visit # Therapist initials Date Arrived NS/ Cx < 24 hr >24 hr Cx Visit Comments   2  02-21-22 X   22 Visits Approved  Date Range: until 05-15-22                                                                                                                                                               Abbreviations: NS = No Show; CX = cancelled      Future Appointments   Date Time Provider Griselda Toscano   3/2/2022 10:00 AM Adrian Iglesias MD [facility]   3/25/2022  8:45 AM Soniya Oliveira MD [facility]   4/14/2022  2:30 PM [facility] OUTREACH INSURANCE [facility]   4/14/2022  3:00 PM Mingo Larios MD [facility]   7/13/2022 10:00 AM Margaret Soto MD [address] [place] [address]

## 2022-02-23 ENCOUNTER — HOSPITAL ENCOUNTER (OUTPATIENT)
Dept: PHYSICAL THERAPY | Age: 77
Discharge: HOME | End: 2022-02-23
Attending: ORTHOPAEDIC SURGERY
Payer: MEDICARE

## 2022-02-23 PROCEDURE — 97110 THERAPEUTIC EXERCISES: CPT

## 2022-02-23 PROCEDURE — 97140 MANUAL THERAPY 1/> REGIONS: CPT

## 2022-02-23 NOTE — PROGRESS NOTES
Juan Alberto Barraza  : 1945  Primary: Lulu Fish Medicare Hmo  Secondary:  [address]  at [place] [id number] [facility], [address] [place] [address],[address], [address] [facility]  [phone]   [phone]         OUTPATIENT PHYSICAL THERAPY: Daily Treatment Note 2022  Visit Count:  3    ICD-10: Treatment Diagnosis: Pain in right shoulder (M25.511)  Precautions/Allergies: y  Patient has no known allergies. TREATMENT PLAN:  Effective Dates: 2022 TO 5/15/2022 (90 days). Frequency/Duration: 2 times a week for 90 Day(s)    Pre-treatment Symptoms/Complaints:  Pt states his shoulder continues to improve. Pain: Initial:   0-10 Post Session:  0-10   Medications Last Reviewed:  2022  Updated Objective Findings:  None Today  TREATMENT:     THERAPEUTIC EXERCISE: (25 minutes):  Exercises per grid below to improve mobility and strength. Required minimal verbal cues to promote proper body alignment and promote proper body posture. Progressed resistance and repetitions as indicated. MANUAL THERAPY: (15 minutes): PROM to R shoulder into flexion, abduction, internal rotation and external rotation was utilized and necessary because of the patient's restricted joint motion.       Date:  22 Date:  22 Date:     Activity/Exercise Parameters Parameters Parameters   Shoulder Pulley into Flexion 20 reps  5 sec holds 20 reps  5 sec holds    Wall Walks 10 reps  5 sec holds     UBE Manual L2  6 minutes Manual L3  6 minutes    Theraband Rows Red T-band  20 reps Red T-band  20 reps    Theraband Straight Arm Pulldowns Red T-band  20 reps Red T-band  20 reps    Wand Flexion in Supine 10 reps  5 sec holds 10 reps  5 sec holds    Wand External Rotation in Supine 10 reps  5 sec holds 10 reps  5 sec holds    Wall Towel Slides  10 reps  10 sec holds    Bent Over Shoulder Ext and Rows  1 Lb  20 reps each        [employer] Portal  Treatment/Session Summary:    · Response to Treatment:  Pt tolerated all treatments well today with min c/o R shoulder pain. ROM continues to improve R shoulder. .  · Communication/Consultation:  None today  · Equipment provided today:  None today  · Recommendations/Intent for next treatment session: Next visit will focus on progression of ROM/strengthening exercises as tolerable.     Total Treatment Billable Duration:  40 minutes  PT Patient Time In/Time Out  Time In: 6724  Time Out: Reuben Lees PT   Visit # Therapist initials Date Arrived NS/ Cx < 24 hr >24 hr Cx Visit Comments   2  02-21-22 X   22 Visits Approved  Date Range: until 05-15-22    3  02-23-22 X                                                                                                                                                        Abbreviations: NS = No Show; CX = cancelled      Future Appointments   Date Time Provider Griselda Dorcas   2/28/2022  1:00 PM Linda , PTA [facility] SFE   3/2/2022 10:00 AM Usman Murguia MD [facility]   3/3/2022  1:00 PM Joeline Margot, PT SFEORPT SFE   3/7/2022  1:45 PM Linda , PTA S[facility]PT SFE   3/10/2022  1:45 PM Linda , PTA S[facility]ORPT SFE   3/14/2022  1:00 PM Joeline Margot, PT SFEORPT SFE   3/17/2022  1:00 PM Joeline Margot, PT SFEORPT SFE   3/21/2022  1:45 PM Linda , PTA SFEORPT SFE   3/24/2022  1:00 PM Linda , PTA SFEORPT SFE   3/25/2022  8:45 AM Jeet Bartlett MD [facility] POAI POA   3/28/2022  1:45 PM Linda , PTA SFEORPT SFE   3/31/2022  1:00 PM Joeline Margot, PT SFEORPT SFE   4/14/2022  2:30 PM [address] [place] [address] OUTREACH INSURANCE [address]   4/14/2022  3:00 PM Artis Miller MD [facility]   7/13/2022 10:00 AM Lida Wright MD 1300 [facility]

## 2022-02-28 ENCOUNTER — HOSPITAL ENCOUNTER (OUTPATIENT)
Dept: PHYSICAL THERAPY | Age: 77
Discharge: HOME | End: 2022-02-28
Attending: ORTHOPAEDIC SURGERY
Payer: MEDICARE

## 2022-02-28 PROCEDURE — 97140 MANUAL THERAPY 1/> REGIONS: CPT

## 2022-02-28 PROCEDURE — 97110 THERAPEUTIC EXERCISES: CPT

## 2022-02-28 NOTE — PROGRESS NOTES
Manjinder Real  : 1945  [facility] [employer] [facility]  Secondary:  [address] at [address] [place] [address],[address], [place] [address].  [phone]   [phone]         OUTPATIENT PHYSICAL THERAPY: Daily Treatment Note 2022  Visit Count:  4    ICD-10: Treatment Diagnosis: Pain in right shoulder (M25.511)  Precautions/Allergies: y  Patient has no known allergies. TREATMENT PLAN:  Effective Dates: 2022 TO 5/15/2022 (90 days). Frequency/Duration: 2 times a week for 90 Day(s)    Pre-treatment Symptoms/Complaints:  Pt states his shoulder continues to improve. I am doing good overall. \"I got some shots and they have really helped\"  Pain: Initial:   0-1/10 Post Session:  0-1/10   Medications Last Reviewed:  2022  Updated Objective Findings:  None Today  TREATMENT:     THERAPEUTIC EXERCISE: (25 minutes):  Exercises per grid below to improve mobility and strength. Required minimal verbal cues to promote proper body alignment and promote proper body posture. Progressed resistance and repetitions as indicated. MANUAL THERAPY: (15 minutes): PROM to R shoulder into flexion, abduction, internal rotation and external rotation was utilized and necessary because of the patient's restricted joint motion.       Date:  22 Date:   Date:     Activity/Exercise Parameters Parameters Parameters   Shoulder Pulley into Flexion 20 reps  5 sec holds     T-Band D1 D2 Blue x 20 reps     Wall Walks X 10 with scapular lift off's     UBE Manual L3  6 minutes     Theraband IR ER Green x 20 reps     Theraband Rows Green T-band  20 reps     Theraband Straight Arm Pulldowns Green T-band  20 reps     Wand Flexion in Supine 10 reps  5 sec holds     Wand External Rotation in Supine 10 reps  5 sec holds     Standing Forward Elevation #1 x 15 reps      Wall Towel Slides 10 reps 10 sec holds     Bent Over Shoulder Ext and Rows #3  20 reps each         [employer] Portal  Treatment/Session Summary:    · Response to Treatment:  Pt tolerated all treatments well today with min c/o R shoulder pain. ROM continues to improve R shoulder. . Worked on some golfing motions with Tband today as patient wants to get back into golfing. · Communication/Consultation:  None today  · Equipment provided today:  None today  · Recommendations/Intent for next treatment session: Next visit will focus on progression of ROM/strengthening exercises as tolerable.     Total Treatment Billable Duration:  40 minutes  PT Patient Time In/Time Out  Time In: 1300  Time Out: [address] , PTA   Visit # Therapist initials Date Arrived NS/ Cx < 24 hr >24 hr Cx Visit Comments   2  02-21-22 X   22 Visits Approved  Date Range: until 05-15-22    3  02-23-22 X      4 [facility] 2-28-22 x                                                                                                                                               Abbreviations: NS = No Show; CX = cancelled      Future Appointments   Date Time Provider Griselda Toscano   2/28/2022  1:00 PM Noea Litzy, PTA [facility] SFE   3/2/2022 10:00 AM Curt Abarca MD [facility]   3/3/2022  1:00 PM Gomez Litter, PT SFEORPT SFE   3/7/2022  1:45 PM Lylia Nanas, PTA SFEORPT SFE   3/10/2022  1:45 PM Lylia Nanas, PTA SFEORPT SFE   3/14/2022  1:00 PM Gomez Litter, PT SFEORPT SFE   3/17/2022  1:00 PM Gomez Litter, PT SFEORPT SFE   3/21/2022  1:45 PM Lylia Nanas, PTA SFEORPT SFE   3/24/2022  1:00 PM Lylia Nanas, PTA SFEORPT SFE   3/25/2022  8:45 AM Filiberto Crockett MD [facility] POA   3/28/2022  1:45 PM Lylia Nanas, PTA SFEORPT SFE   3/31/2022  1:00 PM Gomez Litter, PT SFEORPT SFE   4/14/2022  2:30 PM [facility] OUTREACH INSURANCE [facility]   4/14/2022  3:00 PM Boby Ibrahim MD [facility]   7/13/2022 10:00 AM Morrell Blizzard, Lillis Rail, MD [address] [place] [address]

## 2022-03-03 ENCOUNTER — HOSPITAL ENCOUNTER (OUTPATIENT)
Dept: PHYSICAL THERAPY | Age: 77
Discharge: HOME | End: 2022-03-03
Attending: ORTHOPAEDIC SURGERY
Payer: MEDICARE

## 2022-03-03 PROCEDURE — 97110 THERAPEUTIC EXERCISES: CPT

## 2022-03-03 PROCEDURE — 97140 MANUAL THERAPY 1/> REGIONS: CPT

## 2022-03-03 NOTE — PROGRESS NOTES
Dejon Quezada  : 1945  Primary: Bernard Fish Medicare Hmo  Secondary:  [address] [facility] [address]  [address], [address], [address] [facility]  [phone]   [phone]         OUTPATIENT PHYSICAL THERAPY: Daily Treatment Note 3/3/2022  Visit Count:  5    ICD-10: Treatment Diagnosis: Pain in right shoulder (M25.511)  Precautions/Allergies: y  Patient has no known allergies. TREATMENT PLAN:  Effective Dates: 2022 TO 5/15/2022 (90 days). Frequency/Duration: 2 times a week for 90 Day(s)    Pre-treatment Symptoms/Complaints:  Pt states his shoulder is feeling great today. He states he did some golf swinging exercises with theraband at his last session and his lower back has been hurting since then. Pain: Initial:   0-1/10 Post Session:  0-1/10   Medications Last Reviewed:  3/3/2022  Updated Objective Findings:  None Today  TREATMENT:     THERAPEUTIC EXERCISE: (25 minutes):  Exercises per grid below to improve mobility and strength. Required minimal verbal cues to promote proper body alignment and promote proper body posture. Progressed resistance and repetitions as indicated. MANUAL THERAPY: (15 minutes): PROM to R shoulder into flexion, abduction, internal rotation and external rotation was utilized and necessary because of the patient's restricted joint motion.       Date:  22 Date:  22 Date:     Activity/Exercise Parameters Parameters Parameters   Shoulder Pulley into Flexion 20 reps  5 sec holds     T-Band D1 D2 Blue x 20 reps Not Today    Wall Walks X 10 with scapular lift off's     UBE Manual L3  6 minutes Manual L3  6 minutes    Theraband IR ER Green x 20 reps Green T-band  20 reps each    Theraband Rows Green T-band  20 reps Green T-band  20 reps    Theraband Straight Arm Pulldowns Green T-band  20 reps Green T-band  20 reps    Wand Flexion in Supine 10 reps  5 sec holds     Wand External Rotation in Supine 10 reps  5 sec holds     Standing Forward Elevation #1 x 15 reps      Wall Towel Slides 10 reps 10 sec holds 10 reps  10 sec holds    Bent Over Shoulder Ext and Rows #3  20 reps each 3 Lbs  20 reps each    External Rotation Stretch in Doorway  10 reps  10 sec holds    Wall Push-Ups  15 reps        [employer] Portal  Treatment/Session Summary:    · Response to Treatment:  Pt tolerated all treatments well today with min c/o R shoulder pain. ROM continues to improve R shoulder. . Pt did well with new exercises today. · Communication/Consultation:  None today  · Equipment provided today:  None today  · Recommendations/Intent for next treatment session: Next visit will focus on progression of ROM/strengthening exercises as tolerable.     Total Treatment Billable Duration:  40 minutes  PT Patient Time In/Time Out  Time In: 1300  Time Out: [address] [facility] Harley Alarcon PT   Visit # Therapist initials Date Arrived NS/ Cx < 24 hr >24 hr Cx Visit Comments   2  02-21-22 X   22 Visits Approved  Date Range: until 05-15-22    3  02-23-22 X      4 [facility] 2-28-22 x      5  03-03-22 X                                                                                                                                      Abbreviations: NS = No Show; CX = cancelled      Future Appointments   Date Time Provider Griselda Toscano   3/7/2022  1:45 PM Tan Pascal, PTA [facility] SFE   3/10/2022  1:45 PM Tan Pascal, PTA SFEORPT SFE   3/14/2022  1:00 PM James Miller, PT SFEORPT SFE   3/17/2022  1:00 PM James Miller, PT SFEORPT SFE   3/21/2022  1:45 PM Tan Pascal, PTA SFEORPT SFE   3/24/2022  1:00 PM Tan Pascal, PTA SFEORPT SFE   3/25/2022  8:45 AM Lewis Russell MD [facility] POAI POA   3/28/2022  1:45 PM Tan Pascal, PTA SFEORPT SFE   3/31/2022  1:00 PM James Miller, PT SFEORPT SFE   4/14/2022  2:30 PM [address] [place] [address]   4/14/2022  3:00 PM Marianne Fam MD [place]   7/13/2022 10:00 AM Edy Brooke, Naomi Gallegos MD [address] [place] [address] 9/6/2022  9:40 AM Judie Sue MD [facility]

## 2022-03-07 ENCOUNTER — HOSPITAL ENCOUNTER (OUTPATIENT)
Dept: PHYSICAL THERAPY | Age: 77
Discharge: HOME | End: 2022-03-07
Attending: ORTHOPAEDIC SURGERY
Payer: MEDICARE

## 2022-03-07 NOTE — PROGRESS NOTES
Smita Servin  : 1945  Primary: Hao Fish Medicare Hmo  Secondary:  [address]  at [facility]  [address] [facility], [address] [place] [address],[address], [facility] U. 91.  [phone]   [phone]          OUTPATIENT DAILY NOTE    NAME/AGE/GENDER: Smita Servin is a 78 y.o. male. DATE: 3/7/2022    Patient cancelled (less than 24 hours ago) his appointment for today due to car trouble. Will plan to follow up on next scheduled visit.     Emily Cooks, PTA    Future Appointments   Date Time Provider Griselda Toscano   3/7/2022  1:45 PM Enolia German, PTA [facility] SFE   3/10/2022  1:45 PM Enolia German, PTA SFEORPT SFE   3/14/2022  1:00 PM Radha Corpus, PT SFEORPT SFE   3/17/2022  1:00 PM Radha Corpus, PT [facility] SFE   3/21/2022  1:45 PM Enolia German, PTA SFEORPT SFE   3/24/2022  1:00 PM Enolia German, PTA [facility] SFE   3/25/2022  8:45 AM Ezekiel Grubbs MD [facility] POAI POA   3/28/2022  1:45 PM Enolia German, PTA SFEORPT SFE   3/31/2022  1:00 PM Radha Corpus, PT [facility] SFE   2022  2:30 PM [address] [facility] OUTREACH INSURANCE [facility] [address] [facility]   2022  3:00 PM Francois Metcalf MD [facility]   2022 10:00 AM Mika Shah MD 1300 [facility]   2022  9:40 AM Duke Tyson MD [facility]

## 2022-03-10 ENCOUNTER — HOSPITAL ENCOUNTER (OUTPATIENT)
Dept: PHYSICAL THERAPY | Age: 77
Discharge: HOME | End: 2022-03-10
Attending: ORTHOPAEDIC SURGERY
Payer: MEDICARE

## 2022-03-10 PROCEDURE — 97140 MANUAL THERAPY 1/> REGIONS: CPT

## 2022-03-10 PROCEDURE — 97110 THERAPEUTIC EXERCISES: CPT

## 2022-03-10 NOTE — PROGRESS NOTES
Phebe Goldmann  : 1945  Primary: Etienne Fsih Medicare Hmo  Secondary:  [address]  at [place] [address] [facility], [address] [place] [address],[address] Floor [facility], [place] [address].  [phone]   [phone]         OUTPATIENT PHYSICAL THERAPY: Daily Treatment Note 3/10/2022  Visit Count:  6    ICD-10: Treatment Diagnosis: Pain in right shoulder (M25.511)  Precautions/Allergies: y  Patient has no known allergies. TREATMENT PLAN:  Effective Dates: 2022 TO 5/15/2022 (90 days). Frequency/Duration: 2 times a week for 90 Day(s)    Pre-treatment Symptoms/Complaints: \"My back is bothering me, across the low back\"  Pain: Initial:   0-1/10 Post Session:  0-1/10   Medications Last Reviewed:  3/10/2022  Updated Objective Findings:  None Today  TREATMENT:     THERAPEUTIC EXERCISE: (25 minutes):  Exercises per grid below to improve mobility and strength. Required minimal verbal cues to promote proper body alignment and promote proper body posture. Progressed resistance and repetitions as indicated. MANUAL THERAPY: (15 minutes): PROM to R shoulder into flexion, abduction, internal rotation and external rotation was utilized and necessary because of the patient's restricted joint motion. Date:  03-10-22 Date:     Activity/Exercise Parameters Parameters                  UBE Manual L4  6 minutes    Theraband IR ER Green T-band  20 reps each    Theraband Rows Green T-band  20 reps    Theraband Straight Arm Pulldowns Green T-band  20 reps                   Wall Towel Slides 10 reps  10 sec holds    Bent Over Shoulder Ext and Rows 3 Lbs  20 reps each    External Rotation Stretch in Doorway 10 reps  10 sec holds    Wall Push-Ups 15 reps        [employer] Portal  Treatment/Session Summary:    · Response to Treatment:  Pt tolerated all treatments well today with min c/o R shoulder pain. ROM continues to improve R shoulder. .  Communication/Consultation:  None today  · Equipment provided today:  None today  · Recommendations/Intent for next treatment session: Next visit will focus on progression of ROM/strengthening exercises as tolerable.     Total Treatment Billable Duration:  40 minutes  PT Patient Time In/Time Out  Time In: 1345  Time Out: [address], PTA   Visit # Therapist initials Date Arrived NS/ Cx < 24 hr >24 hr Cx Visit Comments   2  02-21-22 X   22 Visits Approved  Date Range: until 05-15-22    3  02-23-22 X      4 k 2-28-22 x      5  03-03-22 X      6 [facility] 3-10-22 x                                                                                                                             Abbreviations: NS = No Show; CX = cancelled      Future Appointments   Date Time Provider Griselda Toscano   3/10/2022  1:45 PM Tan aPscal, PTA [facility] SFE   3/14/2022  1:00 PM James Miller, PT SFEORPT SFE   3/17/2022  1:00 PM James Miller, PT SFEORPT SFE   3/21/2022  1:45 PM Tan Pascal, PTA SFEORPT SFE   3/24/2022  1:00 PM Tan Pascal, PTA SFEORPT SFE   3/25/2022  8:45 AM Lewis Russell MD [facility] POAI POA   3/28/2022  1:45 PM Tan Pascal, PTA SFEORPT SFE   3/31/2022  1:00 PM James Miller, PT SFEORPT SFE   4/14/2022  2:30 PM [address] [place] [address] OUTREACH INSURANCE [address]   4/14/2022  3:00 PM Marianne Fam MD [facility]   7/13/2022 10:00 AM Rosmery John MD [address] [facility]   9/6/2022  9:40 AM Bessy Santoyo MD [facility]

## 2022-03-14 ENCOUNTER — HOSPITAL ENCOUNTER (OUTPATIENT)
Dept: PHYSICAL THERAPY | Age: 77
Discharge: HOME | End: 2022-03-14
Attending: ORTHOPAEDIC SURGERY
Payer: MEDICARE

## 2022-03-14 NOTE — PROGRESS NOTES
PT Note:  Pt cancelled his physical therapy appt for today due to flu-like symptoms.     Rachid Crockett, PT

## 2022-03-17 ENCOUNTER — HOSPITAL ENCOUNTER (OUTPATIENT)
Dept: PHYSICAL THERAPY | Age: 77
Discharge: HOME | End: 2022-03-17
Attending: ORTHOPAEDIC SURGERY
Payer: MEDICARE

## 2022-03-18 PROBLEM — I77.819 AORTIC ECTASIA (HCC): Status: ACTIVE | Noted: 2020-02-25

## 2022-03-18 PROBLEM — R97.20 ELEVATED PSA: Status: ACTIVE | Noted: 2020-02-25

## 2022-03-19 PROBLEM — N18.31 STAGE 3A CHRONIC KIDNEY DISEASE (HCC): Status: ACTIVE | Noted: 2019-02-20

## 2022-03-19 PROBLEM — R73.03 PREDIABETES: Status: ACTIVE | Noted: 2017-08-16

## 2022-03-21 ENCOUNTER — HOSPITAL ENCOUNTER (OUTPATIENT)
Dept: PHYSICAL THERAPY | Age: 77
Discharge: HOME | End: 2022-03-21
Attending: ORTHOPAEDIC SURGERY
Payer: MEDICARE

## 2022-03-21 PROCEDURE — 97140 MANUAL THERAPY 1/> REGIONS: CPT

## 2022-03-21 PROCEDURE — 97110 THERAPEUTIC EXERCISES: CPT

## 2022-03-21 NOTE — PROGRESS NOTES
Peter Flores  : 1945  Primary: Nathaly Fish Medicare Hmo  Secondary:  [address] at [place] [address] [facility], [address] [place] [address],[address], [facility].  [phone]   [phone]         OUTPATIENT PHYSICAL THERAPY: Daily Treatment Note 3/21/2022  Visit Count:  7    ICD-10: Treatment Diagnosis: Pain in right shoulder (M25.511)  Precautions/Allergies: y  Patient has no known allergies. TREATMENT PLAN:  Effective Dates: 2022 TO 5/15/2022 (90 days). Frequency/Duration: 2 times a week for 90 Day(s)    Pre-treatment Symptoms/Complaints: \"My back is bothering me, across the low back\" \"Shoulder is doing fine\"  Pain: Initial:   0-1/10 Post Session:  0-1/10   Medications Last Reviewed:  3/21/2022  Updated Objective Findings:  None Today  TREATMENT:     THERAPEUTIC EXERCISE: (25 minutes):  Exercises per grid below to improve mobility and strength. Required minimal verbal cues to promote proper body alignment and promote proper body posture. Progressed resistance and repetitions as indicated. MANUAL THERAPY: (15 minutes): PROM to R shoulder into flexion, abduction, internal rotation and external rotation was utilized and necessary because of the patient's restricted joint motion. Date:  22 Date:     Activity/Exercise Parameters Parameters                  UBE Manual L4  6 minutes    Theraband IR ER Green T-band  20 reps each    Theraband Rows Green T-band  20 reps    Theraband Straight Arm Pulldowns Green T-band  20 reps    Bent over flexion extension scaption #2 x 15    Supine flexion from side #2 x 15 reps         Wall Towel Slides 10 reps  10 sec holds    Bent Over Shoulder Ext and Rows 5 Lbs  20 reps each    External Rotation Stretch in Doorway 10 reps  10 sec holds    Wall Push-Ups 15 reps        Med[employer] Portal  Treatment/Session Summary:    · Response to Treatment:  Pt tolerated all treatments well today with min c/o R shoulder pain.   ROM continues to improve R shoulder. .  Communication/Consultation:  None today  · Equipment provided today:  None today  · Recommendations/Intent for next treatment session: Next visit will focus on progression of ROM/strengthening exercises as tolerable.     Total Treatment Billable Duration:  40 minutes  PT Patient Time In/Time Out  Time In: 1345  Time Out: [address], PTA   Visit # Therapist initials Date Arrived NS/ Cx < 24 hr >24 hr Cx Visit Comments   2  02-21-22 X   22 Visits Approved  Date Range: until 05-15-22    3  02-23-22 X      4 rjk 2-28-22 x      5  03-03-22 X      6 rjk 3-10-22 x      7 rjk 3-21-22 x                                                                                                                    Abbreviations: NS = No Show; CX = cancelled      Future Appointments   Date Time Provider Griselad Toscano   3/21/2022  1:45 [facility]   3/24/2022  1:00 PM Arsh Lees PTA [facility] SFE   3/25/2022  8:45 AM Andres Vigil MD [facility] POAI POA   3/28/2022  1:45 PM Arsh Lees PTA SFEORPT SFE   3/31/2022  1:00 PM Linda Corrales, PT SFEORPT SFE   4/14/2022  2:30 PM [facility] OUTREACH INSURANCE [facility]   4/14/2022  3:00 PM Amanda Kiran MD [facility]   7/13/2022 10:00 AM Jae uY MD [address] [place] [address]   9/6/2022  9:40 AM Dov Liang MD [facility]

## 2022-03-22 PROBLEM — M25.511 RIGHT SHOULDER PAIN: Status: ACTIVE | Noted: 2022-03-22

## 2022-03-24 ENCOUNTER — HOSPITAL ENCOUNTER (OUTPATIENT)
Dept: PHYSICAL THERAPY | Age: 77
Discharge: HOME | End: 2022-03-24
Attending: ORTHOPAEDIC SURGERY
Payer: MEDICARE

## 2022-03-24 PROBLEM — M25.511 RIGHT SHOULDER PAIN: Status: ACTIVE | Noted: 2022-03-22

## 2022-03-24 PROCEDURE — 97110 THERAPEUTIC EXERCISES: CPT

## 2022-03-24 PROCEDURE — 97140 MANUAL THERAPY 1/> REGIONS: CPT

## 2022-03-24 NOTE — PROGRESS NOTES
Phebe Goldmann  : 1945  Primary: Etienne Fish Medicare Hmo  Secondary:  [address] at [place] [id number] [facility], [address] [place] [address],[address] [facility], [place] [address].  [phone]   [phone]         OUTPATIENT PHYSICAL THERAPY: Daily Treatment Note 3/24/2022  Visit Count:  8    ICD-10: Treatment Diagnosis: Pain in right shoulder (M25.511)  Precautions/Allergies: y  Patient has no known allergies. TREATMENT PLAN:  Effective Dates: 2022 TO 5/15/2022 (90 days). Frequency/Duration: 2 times a week for 90 Day(s)    Pre-treatment Symptoms/Complaints:  \"I am doing good\"  Pain: Initial:   0-110 Post Session:  0-1/10   Medications Last Reviewed:  3/24/2022  Updated Objective Findings:  None Today  TREATMENT:     THERAPEUTIC EXERCISE: (25 minutes):  Exercises per grid below to improve mobility and strength. Required minimal verbal cues to promote proper body alignment and promote proper body posture. Progressed resistance and repetitions as indicated. MANUAL THERAPY: (15 minutes): PROM to R shoulder into flexion, abduction, internal rotation and external rotation was utilized and necessary because of the patient's restricted joint motion.       Date:  22 Date:     Activity/Exercise Parameters Parameters        Supine Flexion  #3 x 20 reps    Supine bench press #5 x 20 reps    UBE Manual L4  6 minutes    Theraband IR ER blue T-band  20 reps each    Theraband Rows blue T-band  20 reps    Theraband Straight Arm Pulldowns blue T-band  20 reps    Bent over Flexion #5 x 15 reps    Bent over flexion extension  #8 x 20    Supine flexion from side #2 x 15 reps    Bent Over Row #8 x 20 reps    Wall Towel Slides 10 reps  10 sec holds    Bent Over Shoulder Ext and Rows 5 Lbs  20 reps each    External Rotation Stretch in Doorway 10 reps  10 sec holds    Wall Push-Ups x        [employer] Portal  Treatment/Session Summary:    · Response to Treatment:  Pt tolerated all treatments well today with min c/o R shoulder pain. ROM continues to improve R shoulder. .  Communication/Consultation:  None today  · Equipment provided today:  None today  · Recommendations/Intent for next treatment session: Next visit will focus on progression of ROM/strengthening exercises as tolerable.     Total Treatment Billable Duration:  40 minutes  PT Patient Time In/Time Out  Time In: 1300  Time Out: [address] [facility]   Visit # Therapist initials Date Arrived NS/ Cx < 24 hr >24 hr Cx Visit Comments   2  02-21-22 X   22 Visits Approved  Date Range: until 05-15-22    3  02-23-22 X      4 rjk 2-28-22 x      5 BW 03-03-22 X      6 rjk 3-10-22 x      7 rjk 3-21-22 x      8 rjk 3-24-22 x                                                                                                           Abbreviations: NS = No Show; CX = cancelled      Future Appointments   Date Time Provider Griselda Toscano   3/24/2022  1:00 PM Eva Saldivar PTA [facility] SFE   3/25/2022  8:45 AM Ruy Perez MD [facility] POAI POA   3/28/2022  1:45 PM Eva Saldivar PTA [facility] SFE   3/31/2022  1:00 PM Ziyad Roche PT [facility] SFE   4/14/2022  2:30 PM [facility] OUTREACH INSURANCE [facility]   4/14/2022  3:00 PM Pili Nelson MD [facility]   7/13/2022 10:00 AM Fredy Guan MD [address] [facility]   9/6/2022  9:40 AM Claire Wilkerson MD [facility]

## 2022-03-28 ENCOUNTER — HOSPITAL ENCOUNTER (OUTPATIENT)
Dept: PHYSICAL THERAPY | Age: 77
Discharge: HOME | End: 2022-03-28
Attending: ORTHOPAEDIC SURGERY
Payer: MEDICARE

## 2022-03-28 NOTE — PROGRESS NOTES
Alcon Leigh  : 1945  Primary: Hailey Fish Medicare Hmo  Secondary:  [address]  at [place] [id number] [facility], [address] [place] [address],[address], [facility] [address]  [phone]   [phone]          OUTPATIENT DAILY NOTE    NAME/AGE/GENDER: Alcon Leigh is a 78 y.o. male. DATE: 3/28/2022    Patient cancelled (less than 24 hours ago) his appointment for today due to MD released him - Discharge. Will plan to follow up on next scheduled visit.     Daysi Salazar PTA    Future Appointments   Date Time Provider Griselda Toscano   2022  2:30 PM [facility] OUTREACH INSURANCE [facility]   2022  3:00 PM Angie Doe MD [facility]   2022 10:00 AM Marivel Werner MD [address] [place] [address]   2022  9:40 AM Susan Newman MD [facility]

## 2022-03-28 NOTE — PROGRESS NOTES
Erick Bender  : 1945  Primary: Ines Fish Medicare Hmo  Secondary:  [address] at [facility]  [address], [address], [address] [facility]  [phone]   [phone]         OUTPATIENT PHYSICAL THERAPY: DISCHARGE 3/28/2022  Visit Count:  9    ICD-10: Treatment Diagnosis: Pain in right shoulder (M25.511)  Precautions/Allergies: y  Patient has no known allergies. TREATMENT PLAN:  Effective Dates: 2022 TO 5/15/2022 (90 days). Frequency/Duration: 2 times a week for 90 Day(s)  TREATMENT:       [employer]  Treatment/Session Summary:    · Response to Treatment:  Patient was seen for 8 PT visits with good results. MD released him from Physical Therapy today and he cancelled the remaining visits. Discharge at this time. .  Communication/Consultation:  None today  · Equipment provided today:  None today  · Recommendations/Intent for next treatment session: Next visit will focus on progression of ROM/strengthening exercises as tolerable.     Total Treatment Billable Duration:  40 minutes  PT Patient Time In/Time Out  Time In: 1345  Time Out: [address] [facility] [address], [facility]   Visit # Therapist initials Date Arrived NS/ Cx < 24 hr >24 hr Cx Visit Comments   2 BW 02-21-22 X   22 Visits Approved  Date Range: until 05-15-22    3 BW 02-23-22 X      4 rjk 2-28-22 x      5 BW 03-03-22 X      6 rjk 3-10-22 x      7 rjk 3-21-22 x      8 rjk 3-24-22 x   Discharge                                                                                                        Abbreviations: NS = No Show; CX = cancelled      Future Appointments   Date Time Provider Griselda Toscano   2022  2:30 PM [facility] OUTREACH INSURANCE [facility]   2022  3:00 PM Brittany Tavares MD [facility]   2022 10:00 AM Daniel Francisco MD [facility]   2022  9:40 AM Keira Beltrán MD [facility]

## 2022-03-31 ENCOUNTER — APPOINTMENT (OUTPATIENT)
Dept: PHYSICAL THERAPY | Age: 77
End: 2022-03-31
Attending: ORTHOPAEDIC SURGERY
Payer: MEDICARE

## 2022-04-14 ENCOUNTER — HOSPITAL ENCOUNTER (OUTPATIENT)
Dept: LAB | Age: 77
Discharge: HOME | End: 2022-04-14
Payer: MEDICARE

## 2022-04-14 DIAGNOSIS — E55.9 VITAMIN D DEFICIENCY: ICD-10-CM

## 2022-04-14 DIAGNOSIS — D47.2 MGUS (MONOCLONAL GAMMOPATHY OF UNKNOWN SIGNIFICANCE): ICD-10-CM

## 2022-04-14 LAB
25(OH)D3 SERPL-MCNC: 55 NG/ML (ref 30–100)
ALBUMIN SERPL-MCNC: 3.6 G/DL (ref 3.2–4.6)
ALBUMIN/GLOB SERPL: 1.1 {RATIO} (ref 1.2–3.5)
ALP SERPL-CCNC: 73 U/L (ref 50–136)
ALT SERPL-CCNC: 22 U/L (ref 12–65)
ANION GAP SERPL CALC-SCNC: 5 MMOL/L (ref 7–16)
AST SERPL-CCNC: 13 U/L (ref 15–37)
BASOPHILS # BLD: 0.1 K/UL (ref 0–0.2)
BASOPHILS NFR BLD: 1 % (ref 0–2)
BILIRUB SERPL-MCNC: 0.6 MG/DL (ref 0.2–1.1)
BUN SERPL-MCNC: 18 MG/DL (ref 8–23)
CALCIUM SERPL-MCNC: 9.1 MG/DL (ref 8.3–10.4)
CHLORIDE SERPL-SCNC: 107 MMOL/L (ref 98–107)
CO2 SERPL-SCNC: 27 MMOL/L (ref 21–32)
CREAT SERPL-MCNC: 1.6 MG/DL (ref 0.8–1.5)
DIFFERENTIAL METHOD BLD: ABNORMAL
EOSINOPHIL # BLD: 0.2 K/UL (ref 0–0.8)
EOSINOPHIL NFR BLD: 2 % (ref 0.5–7.8)
ERYTHROCYTE [DISTWIDTH] IN BLOOD BY AUTOMATED COUNT: 13 % (ref 11.9–14.6)
GLOBULIN SER CALC-MCNC: 3.3 G/DL (ref 2.3–3.5)
GLUCOSE SERPL-MCNC: 129 MG/DL (ref 65–100)
HCT VFR BLD AUTO: 39.3 %
HGB BLD-MCNC: 13 G/DL (ref 13.6–17.2)
IMM GRANULOCYTES # BLD AUTO: 0 K/UL (ref 0–0.5)
IMM GRANULOCYTES NFR BLD AUTO: 0 % (ref 0–5)
LYMPHOCYTES # BLD: 2.1 K/UL (ref 0.5–4.6)
LYMPHOCYTES NFR BLD: 22 % (ref 13–44)
MCH RBC QN AUTO: 30.7 PG (ref 26.1–32.9)
MCHC RBC AUTO-ENTMCNC: 33.1 G/DL (ref 31.4–35)
MCV RBC AUTO: 92.9 FL (ref 79.6–97.8)
MONOCYTES # BLD: 0.9 K/UL (ref 0.1–1.3)
MONOCYTES NFR BLD: 10 % (ref 4–12)
NEUTS SEG # BLD: 6.3 K/UL (ref 1.7–8.2)
NEUTS SEG NFR BLD: 65 % (ref 43–78)
NRBC # BLD: 0 K/UL (ref 0–0.2)
PLATELET # BLD AUTO: 223 K/UL (ref 150–450)
PMV BLD AUTO: 11 FL (ref 9.4–12.3)
POTASSIUM SERPL-SCNC: 4.4 MMOL/L (ref 3.5–5.1)
PROT SERPL-MCNC: 6.9 G/DL (ref 6.3–8.2)
RBC # BLD AUTO: 4.23 M/UL (ref 4.23–5.6)
SODIUM SERPL-SCNC: 139 MMOL/L (ref 136–145)
WBC # BLD AUTO: 9.6 K/UL (ref 4.3–11.1)

## 2022-04-14 PROCEDURE — 36415 COLL VENOUS BLD VENIPUNCTURE: CPT

## 2022-04-14 PROCEDURE — 82784 ASSAY IGA/IGD/IGG/IGM EACH: CPT

## 2022-04-14 PROCEDURE — 85025 COMPLETE CBC W/AUTO DIFF WBC: CPT

## 2022-04-14 PROCEDURE — 82306 VITAMIN D 25 HYDROXY: CPT

## 2022-04-14 PROCEDURE — 80053 COMPREHEN METABOLIC PANEL: CPT

## 2022-04-18 LAB
ALBUMIN SERPL ELPH-MCNC: 3.7 G/DL (ref 2.9–4.4)
ALBUMIN/GLOB SERPL: 1.3 {RATIO} (ref 0.7–1.7)
ALPHA1 GLOB SERPL ELPH-MCNC: 0.2 G/DL (ref 0–0.4)
ALPHA2 GLOB SERPL ELPH-MCNC: 0.8 G/DL (ref 0.4–1)
B-GLOBULIN SERPL ELPH-MCNC: 1 G/DL (ref 0.7–1.3)
GAMMA GLOB SERPL ELPH-MCNC: 0.9 G/DL (ref 0.4–1.8)
GLOBULIN SER-MCNC: 3 G/DL (ref 2.2–3.9)
IGA SERPL-MCNC: 283 MG/DL (ref 61–437)
IGG SERPL-MCNC: 753 MG/DL (ref 603–1613)
IGM SERPL-MCNC: 211 MG/DL (ref 15–143)
INTERPRETATION SERPL IEP-IMP: ABNORMAL
M PROTEIN SERPL ELPH-MCNC: 0.2 G/DL
PROT SERPL-MCNC: 6.7 G/DL (ref 6–8.5)

## 2022-07-13 ENCOUNTER — OFFICE VISIT (OUTPATIENT)
Dept: RHEUMATOLOGY | Age: 77
End: 2022-07-13
Payer: MEDICARE

## 2022-07-13 VITALS
DIASTOLIC BLOOD PRESSURE: 66 MMHG | SYSTOLIC BLOOD PRESSURE: 108 MMHG | WEIGHT: 171.2 LBS | HEART RATE: 72 BPM | RESPIRATION RATE: 18 BRPM | BODY MASS INDEX: 25.94 KG/M2 | HEIGHT: 68 IN

## 2022-07-13 DIAGNOSIS — M54.9 CHRONIC BACK PAIN GREATER THAN 3 MONTHS DURATION: ICD-10-CM

## 2022-07-13 DIAGNOSIS — Z15.89 HLA B27 (HLA B27 POSITIVE): ICD-10-CM

## 2022-07-13 DIAGNOSIS — Z15.89 HLA B27 (HLA B27 POSITIVE): Primary | ICD-10-CM

## 2022-07-13 DIAGNOSIS — G89.29 CHRONIC BACK PAIN GREATER THAN 3 MONTHS DURATION: ICD-10-CM

## 2022-07-13 PROCEDURE — 1036F TOBACCO NON-USER: CPT | Performed by: INTERNAL MEDICINE

## 2022-07-13 PROCEDURE — 1123F ACP DISCUSS/DSCN MKR DOCD: CPT | Performed by: INTERNAL MEDICINE

## 2022-07-13 PROCEDURE — G8417 CALC BMI ABV UP PARAM F/U: HCPCS | Performed by: INTERNAL MEDICINE

## 2022-07-13 PROCEDURE — G8427 DOCREV CUR MEDS BY ELIG CLIN: HCPCS | Performed by: INTERNAL MEDICINE

## 2022-07-13 PROCEDURE — 99214 OFFICE O/P EST MOD 30 MIN: CPT | Performed by: INTERNAL MEDICINE

## 2022-07-13 PROCEDURE — 3017F COLORECTAL CA SCREEN DOC REV: CPT | Performed by: INTERNAL MEDICINE

## 2022-07-13 ASSESSMENT — PATIENT HEALTH QUESTIONNAIRE - PHQ9
1. LITTLE INTEREST OR PLEASURE IN DOING THINGS: 0
SUM OF ALL RESPONSES TO PHQ QUESTIONS 1-9: 0
2. FEELING DOWN, DEPRESSED OR HOPELESS: 0
SUM OF ALL RESPONSES TO PHQ QUESTIONS 1-9: 0
SUM OF ALL RESPONSES TO PHQ9 QUESTIONS 1 & 2: 0

## 2022-07-13 NOTE — PROGRESS NOTES
7/13/22      SUBJECTIVE:  Rachael Isabel is a 78 y.o. male that is here for follow-up of:     Elevated ESR 77 & hsCRP 21 (n'l <3) - now normal  HLA B27 positivity  negative include RF ANCA, histone dsDNA SSA SSB ACE    PMH:  - CAD s/p PCI, HTN, HL  - MGUS  - Depression  - GERD, esoph dilation Nov 2020     Occupation: retired - commercial real estate  ---  Last OV Jan 2022 when pt reported R shoulder pain. Declined steroid injection at his office visit with me and requested ortho referral -now established care with them. Had steroid injection which has been significantly reduced - now does not experience consistent right shoulder pain. Went to PT and was discharged. Now has infrequent R shoulder pain that improves with stretching. No acute issues with eyes but chronic reduction in vision while reading. No enthesitis or dactylitis or nail changes    Back pain - if standing or walking for extended periods of time - unchanged. Has trouble with bending down to reach items on the floor due to back pain. Am stiffness : < 10 mins    Sometimes notices L foot swelling at end of the day.      REVIEW OF SYSTEMS:  A total of 10 systems (musculoskeletal system as stated in the HPI and the following 9 systems) were reviewed with patient today and were negative except as stated in the HPI and except for the following (depicted with an \"X\"):        \"X\" Constitutional  \"X\" HEENT /Mouth  \"X\" Cardiovascular and  Respiratory (2 systems)  \"X\" Gastrointestinal    Fever/chills   Hair loss   Shortness of breath   Upset stomach    Falls   Dry mouth   Coughing   Diarrhea / constipation    Wt loss   Mouth sores   Wheezing   Heartburn    Wt gain  x Ringing ears   Chest pain   Dark or bloody stools    Night sweats   Diff. swallowing  X None of above   Nausea or vomiting   x None of above   None of above     X None of above                \"X\" Integumentary  \"X\" Neurological  \"X\" Genitourinary  \"X\" Psychiatric   x Easy bruising  x Numbness/ tingling   Female problems   Depression    Rashes   Weakness   Problems with urination   Feeling anxious    Sun sensitivity   Headaches  X None of above   Problems sleeping    None of above   None of above     X None of above           The following portions of the patient's history were reviewed and updated as appropriate:   Current Outpatient Medications   Medication Sig Dispense Refill    amLODIPine-benazepril (LOTREL) 5-20 MG per capsule Take 1 capsule by mouth daily      aspirin 81 MG EC tablet Take by mouth daily      atorvastatin (LIPITOR) 80 MG tablet Take 80 mg by mouth daily      buPROPion (WELLBUTRIN XL) 150 MG extended release tablet Take 150 mg by mouth daily      vitamin D (CHOLECALCIFEROL) 25 MCG (1000 UT) TABS tablet Take by mouth daily      cyclobenzaprine (FLEXERIL) 5 MG tablet Take 5 mg by mouth      metoprolol succinate (TOPROL XL) 25 MG extended release tablet Take 25 mg by mouth daily      nitroGLYCERIN (NITROSTAT) 0.4 MG SL tablet Place under the tongue       No current facility-administered medications for this visit. PHYSICAL EXAM:  Vitals:    07/13/22 1024   BP: 108/66   Pulse: 72   Resp: 18   Weight: 171 lb 3.2 oz (77.7 kg)   Height: 5' 8\" (1.727 m)         CONSTITUTIONAL:  The patient was in NAD.  ENT:  The OPC, MMM, lips/teeth/gums without abnormalities. NECK:  No masses or thyromegaly  LYMPH NODES:  No cervical or axillary lymphadenopathy. CV:  RRR no r/m/g. Normal peripheral pulses  RESP:  CTA bilaterally. Normal respiratory effort. GI:  Abdomen soft, non tender, no hepatosplenomegaly  Skin:  No rashes, nodules, or other lesions.     MUSCULOSKELETAL :  All joints including neck, back bilateral shoulders, elbows, wrists, MCP's, PIP's, DIP's, hips, knees, ankles, toes are within normal limits including normal gross examination, no synovitis, no tenderness, n'l ROM EXCEPT:         ASSESSMENT AND PLAN:  Raghav Jackson is a 78 y.o. male that is here for

## 2022-09-01 RX ORDER — BUPROPION HYDROCHLORIDE 150 MG/1
TABLET ORAL
Qty: 90 TABLET | OUTPATIENT
Start: 2022-09-01

## 2022-09-06 ENCOUNTER — OFFICE VISIT (OUTPATIENT)
Dept: INTERNAL MEDICINE CLINIC | Facility: CLINIC | Age: 77
End: 2022-09-06
Payer: MEDICARE

## 2022-09-06 VITALS
WEIGHT: 167 LBS | HEART RATE: 60 BPM | DIASTOLIC BLOOD PRESSURE: 68 MMHG | SYSTOLIC BLOOD PRESSURE: 134 MMHG | BODY MASS INDEX: 25.31 KG/M2 | TEMPERATURE: 97.3 F | HEIGHT: 68 IN | OXYGEN SATURATION: 99 %

## 2022-09-06 DIAGNOSIS — I77.819 AORTIC ECTASIA (HCC): ICD-10-CM

## 2022-09-06 DIAGNOSIS — E78.2 MIXED HYPERLIPIDEMIA: ICD-10-CM

## 2022-09-06 DIAGNOSIS — I10 ESSENTIAL HYPERTENSION: ICD-10-CM

## 2022-09-06 DIAGNOSIS — Z15.89 HLA-B27 POSITIVE: ICD-10-CM

## 2022-09-06 DIAGNOSIS — R73.03 PREDIABETES: ICD-10-CM

## 2022-09-06 DIAGNOSIS — I25.10 CORONARY ARTERY DISEASE INVOLVING NATIVE CORONARY ARTERY OF NATIVE HEART WITHOUT ANGINA PECTORIS: Primary | ICD-10-CM

## 2022-09-06 DIAGNOSIS — I25.10 CORONARY ARTERY DISEASE INVOLVING NATIVE CORONARY ARTERY OF NATIVE HEART WITHOUT ANGINA PECTORIS: ICD-10-CM

## 2022-09-06 DIAGNOSIS — H53.9 VISION CHANGES: ICD-10-CM

## 2022-09-06 DIAGNOSIS — F32.5 MAJOR DEPRESSIVE DISORDER WITH SINGLE EPISODE, IN FULL REMISSION (HCC): ICD-10-CM

## 2022-09-06 DIAGNOSIS — N18.31 STAGE 3A CHRONIC KIDNEY DISEASE (HCC): ICD-10-CM

## 2022-09-06 DIAGNOSIS — R97.20 ELEVATED PSA: ICD-10-CM

## 2022-09-06 PROBLEM — M25.511 RIGHT SHOULDER PAIN: Status: RESOLVED | Noted: 2022-03-22 | Resolved: 2022-09-06

## 2022-09-06 LAB
ALBUMIN SERPL-MCNC: 3.8 G/DL (ref 3.2–4.6)
ALBUMIN/GLOB SERPL: 1.2 {RATIO} (ref 1.2–3.5)
ALP SERPL-CCNC: 79 U/L (ref 50–136)
ALT SERPL-CCNC: 22 U/L (ref 12–65)
ANION GAP SERPL CALC-SCNC: 4 MMOL/L (ref 4–13)
AST SERPL-CCNC: 6 U/L (ref 15–37)
BASOPHILS # BLD: 0.1 K/UL (ref 0–0.2)
BASOPHILS NFR BLD: 1 % (ref 0–2)
BILIRUB DIRECT SERPL-MCNC: 0.2 MG/DL
BILIRUB SERPL-MCNC: 0.6 MG/DL (ref 0.2–1.1)
BUN SERPL-MCNC: 17 MG/DL (ref 8–23)
CALCIUM SERPL-MCNC: 9.2 MG/DL (ref 8.3–10.4)
CHLORIDE SERPL-SCNC: 109 MMOL/L (ref 101–110)
CHOLEST SERPL-MCNC: 83 MG/DL
CO2 SERPL-SCNC: 29 MMOL/L (ref 21–32)
CREAT SERPL-MCNC: 1.5 MG/DL (ref 0.8–1.5)
CRP SERPL-MCNC: <0.3 MG/DL (ref 0–0.9)
DIFFERENTIAL METHOD BLD: ABNORMAL
EOSINOPHIL # BLD: 0.2 K/UL (ref 0–0.8)
EOSINOPHIL NFR BLD: 3 % (ref 0.5–7.8)
ERYTHROCYTE [DISTWIDTH] IN BLOOD BY AUTOMATED COUNT: 13.2 % (ref 11.9–14.6)
EST. AVERAGE GLUCOSE BLD GHB EST-MCNC: 120 MG/DL
GLOBULIN SER CALC-MCNC: 3.1 G/DL (ref 2.3–3.5)
GLUCOSE SERPL-MCNC: 97 MG/DL (ref 65–100)
HBA1C MFR BLD: 5.8 % (ref 4.8–5.6)
HCT VFR BLD AUTO: 42 % (ref 41.1–50.3)
HDLC SERPL-MCNC: 27 MG/DL (ref 40–60)
HDLC SERPL: 3.1 {RATIO}
HGB BLD-MCNC: 13.3 G/DL (ref 13.6–17.2)
IMM GRANULOCYTES # BLD AUTO: 0 K/UL (ref 0–0.5)
IMM GRANULOCYTES NFR BLD AUTO: 0 % (ref 0–5)
LDLC SERPL CALC-MCNC: 34.4 MG/DL
LYMPHOCYTES # BLD: 2.2 K/UL (ref 0.5–4.6)
LYMPHOCYTES NFR BLD: 25 % (ref 13–44)
MCH RBC QN AUTO: 30.9 PG (ref 26.1–32.9)
MCHC RBC AUTO-ENTMCNC: 31.7 G/DL (ref 31.4–35)
MCV RBC AUTO: 97.7 FL (ref 79.6–97.8)
MONOCYTES # BLD: 0.8 K/UL (ref 0.1–1.3)
MONOCYTES NFR BLD: 9 % (ref 4–12)
NEUTS SEG # BLD: 5.5 K/UL (ref 1.7–8.2)
NEUTS SEG NFR BLD: 62 % (ref 43–78)
NRBC # BLD: 0 K/UL (ref 0–0.2)
PLATELET # BLD AUTO: 223 K/UL (ref 150–450)
PMV BLD AUTO: 12.3 FL (ref 9.4–12.3)
POTASSIUM SERPL-SCNC: 4.7 MMOL/L (ref 3.5–5.1)
PROT SERPL-MCNC: 6.9 G/DL (ref 6.3–8.2)
RBC # BLD AUTO: 4.3 M/UL (ref 4.23–5.6)
SODIUM SERPL-SCNC: 142 MMOL/L (ref 138–145)
TRIGL SERPL-MCNC: 108 MG/DL (ref 35–150)
TSH, 3RD GENERATION: 1.99 UIU/ML (ref 0.36–3.74)
VLDLC SERPL CALC-MCNC: 21.6 MG/DL (ref 6–23)
WBC # BLD AUTO: 8.8 K/UL (ref 4.3–11.1)

## 2022-09-06 PROCEDURE — G8427 DOCREV CUR MEDS BY ELIG CLIN: HCPCS | Performed by: INTERNAL MEDICINE

## 2022-09-06 PROCEDURE — G8417 CALC BMI ABV UP PARAM F/U: HCPCS | Performed by: INTERNAL MEDICINE

## 2022-09-06 PROCEDURE — 1123F ACP DISCUSS/DSCN MKR DOCD: CPT | Performed by: INTERNAL MEDICINE

## 2022-09-06 PROCEDURE — 1036F TOBACCO NON-USER: CPT | Performed by: INTERNAL MEDICINE

## 2022-09-06 PROCEDURE — 99214 OFFICE O/P EST MOD 30 MIN: CPT | Performed by: INTERNAL MEDICINE

## 2022-09-06 PROCEDURE — 3017F COLORECTAL CA SCREEN DOC REV: CPT | Performed by: INTERNAL MEDICINE

## 2022-09-06 ASSESSMENT — PATIENT HEALTH QUESTIONNAIRE - PHQ9
9. THOUGHTS THAT YOU WOULD BE BETTER OFF DEAD, OR OF HURTING YOURSELF: 0
10. IF YOU CHECKED OFF ANY PROBLEMS, HOW DIFFICULT HAVE THESE PROBLEMS MADE IT FOR YOU TO DO YOUR WORK, TAKE CARE OF THINGS AT HOME, OR GET ALONG WITH OTHER PEOPLE: 0
1. LITTLE INTEREST OR PLEASURE IN DOING THINGS: 0
6. FEELING BAD ABOUT YOURSELF - OR THAT YOU ARE A FAILURE OR HAVE LET YOURSELF OR YOUR FAMILY DOWN: 0
4. FEELING TIRED OR HAVING LITTLE ENERGY: 0
SUM OF ALL RESPONSES TO PHQ QUESTIONS 1-9: 0
7. TROUBLE CONCENTRATING ON THINGS, SUCH AS READING THE NEWSPAPER OR WATCHING TELEVISION: 0
SUM OF ALL RESPONSES TO PHQ9 QUESTIONS 1 & 2: 0
2. FEELING DOWN, DEPRESSED OR HOPELESS: 0
SUM OF ALL RESPONSES TO PHQ QUESTIONS 1-9: 0
8. MOVING OR SPEAKING SO SLOWLY THAT OTHER PEOPLE COULD HAVE NOTICED. OR THE OPPOSITE, BEING SO FIGETY OR RESTLESS THAT YOU HAVE BEEN MOVING AROUND A LOT MORE THAN USUAL: 0
3. TROUBLE FALLING OR STAYING ASLEEP: 0
5. POOR APPETITE OR OVEREATING: 0
SUM OF ALL RESPONSES TO PHQ QUESTIONS 1-9: 0
SUM OF ALL RESPONSES TO PHQ QUESTIONS 1-9: 0

## 2022-09-06 ASSESSMENT — ANXIETY QUESTIONNAIRES
4. TROUBLE RELAXING: 0
7. FEELING AFRAID AS IF SOMETHING AWFUL MIGHT HAPPEN: 0
GAD7 TOTAL SCORE: 0
3. WORRYING TOO MUCH ABOUT DIFFERENT THINGS: 0
1. FEELING NERVOUS, ANXIOUS, OR ON EDGE: 0
5. BEING SO RESTLESS THAT IT IS HARD TO SIT STILL: 0
2. NOT BEING ABLE TO STOP OR CONTROL WORRYING: 0
6. BECOMING EASILY ANNOYED OR IRRITABLE: 0
IF YOU CHECKED OFF ANY PROBLEMS ON THIS QUESTIONNAIRE, HOW DIFFICULT HAVE THESE PROBLEMS MADE IT FOR YOU TO DO YOUR WORK, TAKE CARE OF THINGS AT HOME, OR GET ALONG WITH OTHER PEOPLE: NOT DIFFICULT AT ALL

## 2022-09-06 ASSESSMENT — ENCOUNTER SYMPTOMS
SHORTNESS OF BREATH: 0
BLOOD IN STOOL: 0

## 2022-09-06 NOTE — PROGRESS NOTES
Adilia Mckinley M.D. Internal Medicine  [facility]  [address], [address]  Phone: [phone]  Fax: [phone]    Hypertension  This is a chronic problem. The current episode started more than 1 year ago. The problem is unchanged. The problem is controlled. Pertinent negatives include no chest pain or shortness of breath. There are no associated agents to hypertension. Risk factors for coronary artery disease include male gender and dyslipidemia. Past treatments include beta blockers, calcium channel blockers and alpha 1 blockers. The current treatment provides moderate improvement. John Bright is a 78 y.o. White (non-) male. Current Outpatient Medications   Medication Sig Dispense Refill    amLODIPine-benazepril (LOTREL) 5-20 MG per capsule Take 1 capsule by mouth daily      aspirin 81 MG EC tablet Take by mouth daily      atorvastatin (LIPITOR) 80 MG tablet Take 80 mg by mouth daily      buPROPion (WELLBUTRIN XL) 150 MG extended release tablet Take 150 mg by mouth daily      vitamin D (CHOLECALCIFEROL) 25 MCG (1000 UT) TABS tablet Take by mouth daily      metoprolol succinate (TOPROL XL) 25 MG extended release tablet Take 25 mg by mouth daily      nitroGLYCERIN (NITROSTAT) 0.4 MG SL tablet Place under the tongue       No current facility-administered medications for this visit.      No Known Allergies    Past Medical History:   Diagnosis Date    CAD (coronary artery disease)     Dr Joy Knight  stents x 2    CKD (chronic kidney disease)     Esophageal ring     s/p dilation Nov 2020    GERD (gastroesophageal reflux disease)     HLD (hyperlipidemia)     HTN (hypertension)     Ill-defined condition     VASOVAGAL SYNCOPE episode x 3    MGUS (monoclonal gammopathy of unknown significance)     Psychiatric disorder     depression     Past Surgical History:   Procedure Laterality Date    COLONOSCOPY      OTHER SURGICAL HISTORY      EGD/colonoscopy & esophagus dilation OTHER SURGICAL HISTORY      dental, root canal, infection     NE CARDIAC SURG PROCEDURE UNLIST      stents x 2     Social History     Tobacco Use    Smoking status: Former     Packs/day: 1.00     Types: Cigarettes     Quit date: 1997     Years since quittin.1    Smokeless tobacco: Never   Substance Use Topics    Alcohol use: Yes     Alcohol/week: 1.0 standard drink    Drug use: No     Family History   Problem Relation Age of Onset    Hypertension Mother     Heart Disease Mother     Stroke Mother     Diabetes Brother     Cancer Father     Bleeding Prob Father         bladder cancer      Review of Systems   Respiratory:  Negative for shortness of breath. Cardiovascular:  Negative for chest pain. Gastrointestinal:  Negative for blood in stool. Psychiatric/Behavioral:  Negative for self-injury and suicidal ideas. Physical Exam  Vitals and nursing note reviewed. Constitutional:       General: He is not in acute distress. Appearance: Normal appearance. He is not ill-appearing, toxic-appearing or diaphoretic. HENT:      Head: Normocephalic and atraumatic. Right Ear: External ear normal.      Left Ear: External ear normal.   Eyes:      General: No scleral icterus. Right eye: No discharge. Left eye: No discharge. Conjunctiva/sclera: Conjunctivae normal.   Cardiovascular:      Rate and Rhythm: Normal rate and regular rhythm. Heart sounds: Normal heart sounds. No murmur heard. No friction rub. No gallop. Pulmonary:      Effort: Pulmonary effort is normal. No respiratory distress. Breath sounds: Normal breath sounds. No stridor. No wheezing, rhonchi or rales. Abdominal:      General: Abdomen is flat. Bowel sounds are normal. There is no distension. Palpations: Abdomen is soft. There is no mass. Tenderness: There is no abdominal tenderness. There is no guarding or rebound. Musculoskeletal:      Right lower leg: No edema.       Left lower leg: No edema.   Skin:     General: Skin is warm and dry. Coloration: Skin is not jaundiced or pale. Findings: No erythema. Neurological:      General: No focal deficit present. Mental Status: He is alert and oriented to person, place, and time. Mental status is at baseline. Psychiatric:         Mood and Affect: Mood normal.         Behavior: Behavior normal.         Thought Content: Thought content normal.         Judgment: Judgment normal.   I have reviewed/discussed the above normal BMI with the patient. I have recommended the following interventions: dietary management education, guidance, and counseling and encourage exercise . ASSESSMENT AND PLAN:    CAD: [facility] 9/13/16 = GERMAINE x 2 to RCA. Follows with Cardiology (Dr. Antonieta Rodriguez). Maintained on Asa. Risk factors medically modified per below. Taking medications w/o problems. Well controlled w/o angina or BLACK. Continue Asa (No bleeding or falls). Continue risk factor modification per below. Follow up with Dr. Antonieta Rodriguez (Also with aortic ectasia). HTN: Taking current regimen (Amlodipine-Benazepril 5-20, Toprol XL 25) w/o problems. Home BPs = 120-130s/70s. Well controlled. Continue current regimen. Asked to monitor BP at home, call me if it runs above 140/80, and bring in a log next time. HLD: Taking current regimen (Lipitor 80) w/o problems. Well controlled. Continue current regimen. FLPs:   8/15/16 on Lipitor 80 = [90/47/22/109]. 8/26/21 on Lipitor 80 = [90/45/27/88]. 9/6/22 on Lipitor 80 = [  Prediabetes: Stable off medications. Continue TLCs. A1Cs:   8/15/16 = 5.7.   3/2/22 = 6.0.   9/6/22 =   CKD3: Baseline Cr ? 1.3-1.5 and stable (Since 2017). Stable and asymptomatic. Continue to monitor. Counseled to avoid NSAIDs. Depression: Taking current regimen (Wellbutrin ) without problems and with good control of symptoms. Well controlled. Continue current regimen.    HCM:   Colonoscopy: 10/29/20 by Dr. Dunn Jessy = IH; Tics; HP; 5 Year Repeat Rec. Prostate:   PSA's:  8/15/16 = 1.6.   8/20/19 = 5.7 ==> Referred to Urology, and he didn't comply. 3/2/22 = 2.2.   9/6/22 =   Flu: Yordy Fortune 2021. F/u: 6 Months. Non-fasting labs at that visit. Vy Eldridge was seen today for hypertension and cholesterol problem. Diagnoses and all orders for this visit:    Coronary artery disease involving native coronary artery of native heart without angina pectoris  -     Basic Metabolic Panel; Future  -     CBC with Auto Differential; Future  -     Lipid Panel; Future  -     Hepatic Function Panel; Future  -     TSH; Future    Essential hypertension  -     Basic Metabolic Panel; Future  -     CBC with Auto Differential; Future  -     Lipid Panel; Future  -     Hepatic Function Panel; Future  -     TSH; Future    Mixed hyperlipidemia  -     Basic Metabolic Panel; Future  -     CBC with Auto Differential; Future  -     Lipid Panel; Future  -     Hepatic Function Panel; Future  -     TSH; Future    Prediabetes  -     Basic Metabolic Panel; Future  -     Hemoglobin A1C; Future    Stage 3a chronic kidney disease ([facility] [address]  -     Basic Metabolic Panel; Future  -     CBC with Auto Differential; Future  -     Lipid Panel; Future  -     Hepatic Function Panel; Future  -     TSH; Future    Major depressive disorder with single episode, in full remission ([facility] [address]    Aortic ectasia ([facility] [address]  -     Basic Metabolic Panel; Future  -     CBC with Auto Differential; Future  -     Lipid Panel; Future  -     Hepatic Function Panel; Future  -     TSH; Future    Elevated PSA  -     PSA, ultrasensitive; Future    HLA-B27 positive  -     Sedimentation Rate; Future  -     C-Reactive Protein; Future    Vision changes  -     [facility] - [employer]    Return in about 6 months (around 3/6/2023).

## 2022-09-07 LAB — ERYTHROCYTE [SEDIMENTATION RATE] IN BLOOD: 8 MM/HR

## 2022-09-08 LAB — PSA SERPL DL<=0.01 NG/ML-MCNC: 2.17 NG/ML (ref 0–4)

## 2023-01-17 ENCOUNTER — OFFICE VISIT (OUTPATIENT)
Dept: CARDIOLOGY CLINIC | Age: 78
End: 2023-01-17

## 2023-01-17 VITALS
BODY MASS INDEX: 24.81 KG/M2 | SYSTOLIC BLOOD PRESSURE: 120 MMHG | HEIGHT: 68 IN | WEIGHT: 163.7 LBS | DIASTOLIC BLOOD PRESSURE: 60 MMHG | HEART RATE: 68 BPM

## 2023-01-17 DIAGNOSIS — I25.10 ASCVD (ARTERIOSCLEROTIC CARDIOVASCULAR DISEASE): Primary | ICD-10-CM

## 2023-01-17 PROBLEM — I25.118 ATHEROSCLEROTIC HEART DISEASE OF NATIVE CORONARY ARTERY WITH OTHER FORMS OF ANGINA PECTORIS (HCC): Status: ACTIVE | Noted: 2023-01-17

## 2023-01-17 RX ORDER — ATORVASTATIN CALCIUM 80 MG/1
80 TABLET, FILM COATED ORAL DAILY
Qty: 90 TABLET | Refills: 3 | Status: SHIPPED | OUTPATIENT
Start: 2023-01-17

## 2023-01-17 RX ORDER — AMLODIPINE BESYLATE AND BENAZEPRIL HYDROCHLORIDE 5; 20 MG/1; MG/1
1 CAPSULE ORAL DAILY
Qty: 90 CAPSULE | Refills: 3 | Status: SHIPPED | OUTPATIENT
Start: 2023-01-17

## 2023-01-17 RX ORDER — METOPROLOL SUCCINATE 25 MG/1
25 TABLET, EXTENDED RELEASE ORAL DAILY
Qty: 90 TABLET | Refills: 3 | Status: SHIPPED | OUTPATIENT
Start: 2023-01-17

## 2023-01-17 NOTE — PROGRESS NOTES
[address], [address]  PHONE: [phone]        23        NAME:  Paramjit Carl  : 1945  MRN: [id number]     CHIEF COMPLAINT:    Coronary Artery Disease      SUBJECTIVE:     Over the last year, no hospitalization, new medication or physician assignment. Medications were all reviewed with the patient today and updated as necessary. Current Outpatient Medications   Medication Sig    metoprolol succinate (TOPROL XL) 25 MG extended release tablet Take 1 tablet by mouth daily    amLODIPine-benazepril (LOTREL) 5-20 MG per capsule Take 1 capsule by mouth daily    atorvastatin (LIPITOR) 80 MG tablet Take 1 tablet by mouth daily    aspirin 81 MG EC tablet Take by mouth daily    buPROPion (WELLBUTRIN XL) 150 MG extended release tablet Take 150 mg by mouth daily    vitamin D (CHOLECALCIFEROL) 25 MCG (1000 UT) TABS tablet Take by mouth daily    nitroGLYCERIN (NITROSTAT) 0.4 MG SL tablet Place under the tongue     No current facility-administered medications for this visit. No Known Allergies        PHYSICAL EXAM:     Wt Readings from Last 3 Encounters:   23 163 lb 11.2 oz (74.3 kg)   22 167 lb (75.8 kg)   22 171 lb 3.2 oz (77.7 kg)     BP Readings from Last 3 Encounters:   23 120/60   22 134/68   22 108/66       /60   Pulse 68   Ht 5' 8\" (1.727 m)   Wt 163 lb 11.2 oz (74.3 kg)   BMI 24.89 kg/m²     Physical Exam  Vitals reviewed. HENT:      Head: Normocephalic and atraumatic. Eyes:      Extraocular Movements: Extraocular movements intact. Pupils: Pupils are equal, round, and reactive to light. Cardiovascular:      Rate and Rhythm: Normal rate. Heart sounds: Normal heart sounds. Pulmonary:      Effort: Pulmonary effort is normal.      Breath sounds: Normal breath sounds. Abdominal:      General: Abdomen is flat. Palpations: Abdomen is soft. There is no mass. Musculoskeletal:         General: Normal range of motion. Cervical back: Normal range of motion. Skin:     General: Skin is warm and dry. Neurological:      General: No focal deficit present. Mental Status: He is alert and oriented to person, place, and time. Psychiatric:         Mood and Affect: Mood normal.         RECENT LABS AND RECORDS REVIEW    none      ASSESSMENT and Chadd Perdomo was seen today for coronary artery disease. Diagnoses and all orders for this visit:    ASCVD (arteriosclerotic cardiovascular disease),  rca 2016  -     metoprolol succinate (TOPROL XL) 25 MG extended release tablet; Take 1 tablet by mouth daily  -     amLODIPine-benazepril (LOTREL) 5-20 MG per capsule; Take 1 capsule by mouth daily  -     atorvastatin (LIPITOR) 80 MG tablet; Take 1 tablet by mouth daily     ////    CV status is stable. Medications and most recent labs reviewed. Diet and exercise are encouraged. Greater  than 50% of today's visit was devoted to counseling the patient, explaining disease concepts and offering advice and suggestions for optimal care. Return in about 1 year (around 1/17/2024).        Yovanny Fernandez MD  1/17/2023  10:41 AM

## 2023-02-27 DIAGNOSIS — I25.10 ASCVD (ARTERIOSCLEROTIC CARDIOVASCULAR DISEASE): ICD-10-CM

## 2023-02-27 RX ORDER — ATORVASTATIN CALCIUM 80 MG/1
80 TABLET, FILM COATED ORAL DAILY
Qty: 90 TABLET | Refills: 3 | OUTPATIENT
Start: 2023-02-27

## 2023-02-27 RX ORDER — BUPROPION HYDROCHLORIDE 150 MG/1
TABLET ORAL
Qty: 30 TABLET | Refills: 0 | Status: SHIPPED | OUTPATIENT
Start: 2023-02-27

## 2023-02-27 RX ORDER — AMLODIPINE BESYLATE AND BENAZEPRIL HYDROCHLORIDE 5; 20 MG/1; MG/1
1 CAPSULE ORAL DAILY
Qty: 90 CAPSULE | Refills: 3 | OUTPATIENT
Start: 2023-02-27

## 2023-03-06 ENCOUNTER — OFFICE VISIT (OUTPATIENT)
Dept: INTERNAL MEDICINE CLINIC | Facility: CLINIC | Age: 78
End: 2023-03-06

## 2023-03-06 VITALS
HEART RATE: 61 BPM | SYSTOLIC BLOOD PRESSURE: 128 MMHG | HEIGHT: 68 IN | TEMPERATURE: 97 F | WEIGHT: 165 LBS | BODY MASS INDEX: 25.01 KG/M2 | DIASTOLIC BLOOD PRESSURE: 69 MMHG | OXYGEN SATURATION: 98 %

## 2023-03-06 DIAGNOSIS — E78.2 MIXED HYPERLIPIDEMIA: ICD-10-CM

## 2023-03-06 DIAGNOSIS — I25.10 ASCVD (ARTERIOSCLEROTIC CARDIOVASCULAR DISEASE): ICD-10-CM

## 2023-03-06 DIAGNOSIS — J30.9 ALLERGIC RHINITIS, UNSPECIFIED SEASONALITY, UNSPECIFIED TRIGGER: ICD-10-CM

## 2023-03-06 DIAGNOSIS — I77.819 AORTIC ECTASIA (HCC): ICD-10-CM

## 2023-03-06 DIAGNOSIS — I10 ESSENTIAL HYPERTENSION: ICD-10-CM

## 2023-03-06 DIAGNOSIS — Z00.00 MEDICARE ANNUAL WELLNESS VISIT, SUBSEQUENT: Primary | ICD-10-CM

## 2023-03-06 DIAGNOSIS — N18.31 STAGE 3A CHRONIC KIDNEY DISEASE (HCC): ICD-10-CM

## 2023-03-06 DIAGNOSIS — R73.03 PREDIABETES: ICD-10-CM

## 2023-03-06 DIAGNOSIS — F32.5 MAJOR DEPRESSIVE DISORDER WITH SINGLE EPISODE, IN FULL REMISSION (HCC): ICD-10-CM

## 2023-03-06 PROBLEM — I25.118 ATHEROSCLEROTIC HEART DISEASE OF NATIVE CORONARY ARTERY WITH OTHER FORMS OF ANGINA PECTORIS (HCC): Status: RESOLVED | Noted: 2023-01-17 | Resolved: 2023-03-06

## 2023-03-06 LAB
BASOPHILS # BLD: 0.1 K/UL (ref 0–0.2)
BASOPHILS NFR BLD: 1 % (ref 0–2)
DIFFERENTIAL METHOD BLD: ABNORMAL
EOSINOPHIL # BLD: 0.2 K/UL (ref 0–0.8)
EOSINOPHIL NFR BLD: 2 % (ref 0.5–7.8)
ERYTHROCYTE [DISTWIDTH] IN BLOOD BY AUTOMATED COUNT: 13.1 % (ref 11.9–14.6)
EST. AVERAGE GLUCOSE BLD GHB EST-MCNC: 114 MG/DL
HBA1C MFR BLD: 5.6 % (ref 4.8–5.6)
HCT VFR BLD AUTO: 40.2 % (ref 41.1–50.3)
HGB BLD-MCNC: 13 G/DL (ref 13.6–17.2)
IMM GRANULOCYTES # BLD AUTO: 0 K/UL (ref 0–0.5)
IMM GRANULOCYTES NFR BLD AUTO: 0 % (ref 0–5)
LYMPHOCYTES # BLD: 2.5 K/UL (ref 0.5–4.6)
LYMPHOCYTES NFR BLD: 26 % (ref 13–44)
MCH RBC QN AUTO: 31.1 PG (ref 26.1–32.9)
MCHC RBC AUTO-ENTMCNC: 32.3 G/DL (ref 31.4–35)
MCV RBC AUTO: 96.2 FL (ref 82–102)
MONOCYTES # BLD: 1.1 K/UL (ref 0.1–1.3)
MONOCYTES NFR BLD: 12 % (ref 4–12)
NEUTS SEG # BLD: 5.8 K/UL (ref 1.7–8.2)
NEUTS SEG NFR BLD: 59 % (ref 43–78)
NRBC # BLD: 0 K/UL (ref 0–0.2)
PLATELET # BLD AUTO: 231 K/UL (ref 150–450)
PMV BLD AUTO: 12 FL (ref 9.4–12.3)
RBC # BLD AUTO: 4.18 M/UL (ref 4.23–5.6)
WBC # BLD AUTO: 9.8 K/UL (ref 4.3–11.1)

## 2023-03-06 RX ORDER — MONTELUKAST SODIUM 10 MG/1
10 TABLET ORAL DAILY
Qty: 90 TABLET | Refills: 1 | Status: SHIPPED | OUTPATIENT
Start: 2023-03-06

## 2023-03-06 RX ORDER — LANSOPRAZOLE 30 MG/1
CAPSULE, DELAYED RELEASE ORAL
COMMUNITY
Start: 2023-02-21

## 2023-03-06 RX ORDER — BUPROPION HYDROCHLORIDE 150 MG/1
TABLET ORAL
Qty: 90 TABLET | Refills: 1 | Status: SHIPPED | OUTPATIENT
Start: 2023-03-06

## 2023-03-06 SDOH — ECONOMIC STABILITY: FOOD INSECURITY: WITHIN THE PAST 12 MONTHS, THE FOOD YOU BOUGHT JUST DIDN'T LAST AND YOU DIDN'T HAVE MONEY TO GET MORE.: NEVER TRUE

## 2023-03-06 SDOH — ECONOMIC STABILITY: FOOD INSECURITY: WITHIN THE PAST 12 MONTHS, YOU WORRIED THAT YOUR FOOD WOULD RUN OUT BEFORE YOU GOT MONEY TO BUY MORE.: NEVER TRUE

## 2023-03-06 SDOH — ECONOMIC STABILITY: INCOME INSECURITY: HOW HARD IS IT FOR YOU TO PAY FOR THE VERY BASICS LIKE FOOD, HOUSING, MEDICAL CARE, AND HEATING?: NOT HARD AT ALL

## 2023-03-06 SDOH — ECONOMIC STABILITY: HOUSING INSECURITY
IN THE LAST 12 MONTHS, WAS THERE A TIME WHEN YOU DID NOT HAVE A STEADY PLACE TO SLEEP OR SLEPT IN A SHELTER (INCLUDING NOW)?: NO

## 2023-03-06 ASSESSMENT — PATIENT HEALTH QUESTIONNAIRE - PHQ9
SUM OF ALL RESPONSES TO PHQ QUESTIONS 1-9: 0
6. FEELING BAD ABOUT YOURSELF - OR THAT YOU ARE A FAILURE OR HAVE LET YOURSELF OR YOUR FAMILY DOWN: 0
SUM OF ALL RESPONSES TO PHQ QUESTIONS 1-9: 0
7. TROUBLE CONCENTRATING ON THINGS, SUCH AS READING THE NEWSPAPER OR WATCHING TELEVISION: 0
10. IF YOU CHECKED OFF ANY PROBLEMS, HOW DIFFICULT HAVE THESE PROBLEMS MADE IT FOR YOU TO DO YOUR WORK, TAKE CARE OF THINGS AT HOME, OR GET ALONG WITH OTHER PEOPLE: 0
1. LITTLE INTEREST OR PLEASURE IN DOING THINGS: 0
5. POOR APPETITE OR OVEREATING: 0
2. FEELING DOWN, DEPRESSED OR HOPELESS: 0
SUM OF ALL RESPONSES TO PHQ9 QUESTIONS 1 & 2: 0
4. FEELING TIRED OR HAVING LITTLE ENERGY: 0
9. THOUGHTS THAT YOU WOULD BE BETTER OFF DEAD, OR OF HURTING YOURSELF: 0
8. MOVING OR SPEAKING SO SLOWLY THAT OTHER PEOPLE COULD HAVE NOTICED. OR THE OPPOSITE, BEING SO FIGETY OR RESTLESS THAT YOU HAVE BEEN MOVING AROUND A LOT MORE THAN USUAL: 0
3. TROUBLE FALLING OR STAYING ASLEEP: 0

## 2023-03-06 ASSESSMENT — ENCOUNTER SYMPTOMS
SHORTNESS OF BREATH: 0
BLOOD IN STOOL: 0

## 2023-03-06 ASSESSMENT — ANXIETY QUESTIONNAIRES
2. NOT BEING ABLE TO STOP OR CONTROL WORRYING: 0
3. WORRYING TOO MUCH ABOUT DIFFERENT THINGS: 0
1. FEELING NERVOUS, ANXIOUS, OR ON EDGE: 0
IF YOU CHECKED OFF ANY PROBLEMS ON THIS QUESTIONNAIRE, HOW DIFFICULT HAVE THESE PROBLEMS MADE IT FOR YOU TO DO YOUR WORK, TAKE CARE OF THINGS AT HOME, OR GET ALONG WITH OTHER PEOPLE: NOT DIFFICULT AT ALL
5. BEING SO RESTLESS THAT IT IS HARD TO SIT STILL: 0
GAD7 TOTAL SCORE: 0
7. FEELING AFRAID AS IF SOMETHING AWFUL MIGHT HAPPEN: 0
4. TROUBLE RELAXING: 0
6. BECOMING EASILY ANNOYED OR IRRITABLE: 0

## 2023-03-06 NOTE — PATIENT INSTRUCTIONS
Please arrive 20 minutes prior to your scheduled time to see the doctor to allow sufficient time for check-in and rooming. I recommend all standard healthcare maintenance and cancer screenings (Colon cancer screening, Mammogram and Bone Density if female and appropriate, etc) and standard immunizations (Flu, Pneumonia, Covid-19, Tetanus boosters, etc) as appropriate and due to lower your risk for potentially preventable morbidity/mortality from these diseases. Advance Directives: Care Instructions  Overview  An advance directive is a legal way to state your wishes at the end of your life. It tells your family and your doctor what to do if you can't say what you want. There are two main types of advance directives. You can change them any time your wishes change. Living will. This form tells your family and your doctor your wishes about life support and other treatment. The form is also called a declaration. Medical power of . This form lets you name a person to make treatment decisions for you when you can't speak for yourself. This person is called a health care agent (health care proxy, health care surrogate). The form is also called a durable power of  for health care. If you do not have an advance directive, decisions about your medical care may be made by a family member, or by a doctor or a  who doesn't know you. It may help to think of an advance directive as a gift to the people who care for you. If you have one, they won't have to make tough decisions by themselves. For more information, including forms for your state, see the [address] website (www.caringinfo.org/planning/advance-directives/). Follow-up care is a key part of your treatment and safety. Be sure to make and go to all appointments, and call your doctor if you are having problems. It's also a good idea to know your test results and keep a list of the medicines you take.   What should you include in an advance directive? Many states have a unique advance directive form. (It may ask you to address specific issues.) Or you might use a universal form that's approved by many states. If your form doesn't tell you what to address, it may be hard to know what to include in your advance directive. Use the questions below to help you get started. Who do you want to make decisions about your medical care if you are not able to? What life-support measures do you want if you have a serious illness that gets worse over time or can't be cured? What are you most afraid of that might happen? (Maybe you're afraid of having pain, losing your independence, or being kept alive by machines.)  Where would you prefer to die? (Your home? A hospital? A nursing home?)  Do you want to donate your organs when you die? Do you want certain Gnosticist practices performed before you die? When should you call for help? Be sure to contact your doctor if you have any questions. Where can you learn more? Go to http://www.torrez.com/ and enter R264 to learn more about \"Advance Directives: Care Instructions. \"  Current as of: June 16, 2022               Content Version: 13.5  © 7101-6201 [employer]. Care instructions adapted under license by [facility] ([facility]). If you have questions about a medical condition or this instruction, always ask your healthcare professional. [place] [address] any warranty or liability for your use of this information. A Healthy Heart: Care Instructions  Your Care Instructions     Coronary artery disease, also called heart disease, occurs when a substance called plaque builds up in the vessels that supply oxygen-rich blood to your heart muscle. This can narrow the blood vessels and reduce blood flow. A heart attack happens when blood flow is completely blocked. A high-fat diet, smoking, and other factors increase the risk of heart disease.   Your doctor has found that you have a chance of having heart disease. You can do lots of things to keep your heart healthy. It may not be easy, but you can change your diet, exercise more, and quit smoking. These steps really work to lower your chance of heart disease. Follow-up care is a key part of your treatment and safety. Be sure to make and go to all appointments, and call your doctor if you are having problems. It's also a good idea to know your test results and keep a list of the medicines you take. How can you care for yourself at home? Diet    Use less salt when you cook and eat. This helps lower your blood pressure. Taste food before salting. Add only a little salt when you think you need it. With time, your taste buds will adjust to less salt.     Eat fewer snack items, fast foods, canned soups, and other high-salt, high-fat, processed foods.     Read food labels and try to avoid saturated and trans fats. They increase your risk of heart disease by raising cholesterol levels.     Limit the amount of solid fat-butter, margarine, and shortening-you eat. Use olive, peanut, or canola oil when you cook. Bake, broil, and steam foods instead of frying them.     Eat a variety of fruit and vegetables every day. Dark green, deep orange, red, or yellow fruits and vegetables are especially good for you. Examples include spinach, carrots, peaches, and berries.     Foods high in fiber can reduce your cholesterol and provide important vitamins and minerals. High-fiber foods include whole-grain cereals and breads, oatmeal, beans, brown rice, citrus fruits, and apples.     Eat lean proteins. Heart-healthy proteins include seafood, lean meats and poultry, eggs, beans, peas, nuts, seeds, and soy products.     Limit drinks and foods with added sugar. These include candy, desserts, and soda pop. Lifestyle changes    If your doctor recommends it, get more exercise. Walking is a good choice. Bit by bit, increase the amount you walk every day.  Try for at least 30 minutes on most days of the week. You also may want to swim, bike, or do other activities.     Do not smoke. If you need help quitting, talk to your doctor about stop-smoking programs and medicines. These can increase your chances of quitting for good. Quitting smoking may be the most important step you can take to protect your heart. It is never too late to quit.     Limit alcohol to 2 drinks a day for men and 1 drink a day for women. Too much alcohol can cause health problems.     Manage other health problems such as diabetes, high blood pressure, and high cholesterol. If you think you may have a problem with alcohol or drug use, talk to your doctor. Medicines    Take your medicines exactly as prescribed. Call your doctor if you think you are having a problem with your medicine.     If your doctor recommends aspirin, take the amount directed each day. Make sure you take aspirin and not another kind of pain reliever, such as acetaminophen (Tylenol). When should you call for help? Call 911 if you have symptoms of a heart attack. These may include:    Chest pain or pressure, or a strange feeling in the chest.     Sweating.     Shortness of breath.     Pain, pressure, or a strange feeling in the back, neck, jaw, or upper belly or in one or both shoulders or arms.     Lightheadedness or sudden weakness.     A fast or irregular heartbeat. After you call 911, the  may tell you to chew 1 adult-strength or 2 to 4 low-dose aspirin. Wait for an ambulance. Do not try to drive yourself. Watch closely for changes in your health, and be sure to contact your doctor if you have any problems. Where can you learn more? Go to http://www.torrez.com/ and enter F075 to learn more about \"A Healthy Heart: Care Instructions. \"  Current as of: September 7, 2022               Content Version: 13.5  © 7084-3910 [employer]. Care instructions adapted under license by [facility] ([facility]). If you have questions about a medical condition or this instruction, always ask your healthcare professional. [place] [address] any warranty or liability for your use of this information. Personalized Preventive Plan for Asher [place] - 3/6/2023  Medicare offers a range of preventive health benefits. Some of the tests and screenings are paid in full while other may be subject to a deductible, co-insurance, and/or copay. Some of these benefits include a comprehensive review of your medical history including lifestyle, illnesses that may run in your family, and various assessments and screenings as appropriate. After reviewing your medical record and screening and assessments performed today your provider may have ordered immunizations, labs, imaging, and/or referrals for you. A list of these orders (if applicable) as well as your Preventive Care list are included within your After Visit Summary for your review. Other Preventive Recommendations:    A preventive eye exam performed by an eye specialist is recommended every 1-2 years to screen for glaucoma; cataracts, macular degeneration, and other eye disorders. A preventive dental visit is recommended every 6 months. Try to get at least 150 minutes of exercise per week or 10,000 steps per day on a pedometer . Order or download the FREE \"Exercise & Physical Activity: Your Everyday Guide\" from [employer]. Call 0-303.792.3837 or search [employer] online. You need 4075-7055 mg of calcium and 3814-7988 IU of vitamin D per day. It is possible to meet your calcium requirement with diet alone, but a vitamin D supplement is usually necessary to meet this goal.  When exposed to the sun, use a sunscreen that protects against both UVA and UVB radiation with an SPF of 30 or greater. Reapply every 2 to 3 hours or after sweating, drying off with a towel, or swimming.   Always wear a seat belt when traveling in a car. Always wear a helmet when riding a bicycle or motorcycle.

## 2023-03-06 NOTE — PROGRESS NOTES
Dot Spain M.D. Internal Medicine  [address], [address]  Phone: [phone]  Fax: [phone]    Hypertension  This is a chronic problem. The current episode started more than 1 year ago. The problem is unchanged. The problem is controlled. Pertinent negatives include no chest pain or shortness of breath. There are no associated agents to hypertension. Risk factors for coronary artery disease include male gender and dyslipidemia. Past treatments include beta blockers, calcium channel blockers and alpha 1 blockers. The current treatment provides moderate improvement. Allergic Rhinitis   Presents for initial visit. He complains of congestion. The problem occurs daily. Timing of symptoms is not consistent. Symptom severity has been moderate. Symptoms have no pattern. Aicha De Leon is a 78 y.o. White (non-) male. Current Outpatient Medications   Medication Sig Dispense Refill    buPROPion (WELLBUTRIN XL) 150 MG extended release tablet TAKE 1 TABLET BY MOUTH DAILY 30 tablet 0    metoprolol succinate (TOPROL XL) 25 MG extended release tablet Take 1 tablet by mouth daily 90 tablet 3    amLODIPine-benazepril (LOTREL) 5-20 MG per capsule Take 1 capsule by mouth daily 90 capsule 3    atorvastatin (LIPITOR) 80 MG tablet Take 1 tablet by mouth daily 90 tablet 3    aspirin 81 MG EC tablet Take by mouth daily      vitamin D (CHOLECALCIFEROL) 25 MCG (1000 UT) TABS tablet Take by mouth daily      nitroGLYCERIN (NITROSTAT) 0.4 MG SL tablet Place under the tongue       No current facility-administered medications for this visit.      No Known Allergies    Past Medical History:   Diagnosis Date    CAD (coronary artery disease)     Dr Oswaldo Norman  stents x 2    CKD (chronic kidney disease)     Esophageal ring     s/p dilation Nov 2020    GERD (gastroesophageal reflux disease)     HLD (hyperlipidemia)     HTN (hypertension)     Ill-defined condition     VASOVAGAL SYNCOPE episode x 3    MGUS (monoclonal gammopathy of unknown significance)     Psychiatric disorder     depression     Past Surgical History:   Procedure Laterality Date    COLONOSCOPY      OTHER SURGICAL HISTORY      EGD/colonoscopy & esophagus dilation    OTHER SURGICAL HISTORY      dental, root canal, infection     [place] CARDIAC SURG PROCEDURE UNLIST      stents x 2     Social History     Tobacco Use    Smoking status: Former     Packs/day: 1.00     Types: Cigarettes     Quit date: 1997     Years since quittin.6    Smokeless tobacco: Never   Substance Use Topics    Alcohol use: Yes     Alcohol/week: 1.0 standard drink    Drug use: No     Family History   Problem Relation Age of Onset    Hypertension Mother     Heart Disease Mother     Stroke Mother     Diabetes Brother     Cancer Father     Bleeding Prob Father         bladder cancer      Review of Systems   HENT:  Positive for congestion. Respiratory:  Negative for shortness of breath. Cardiovascular:  Negative for chest pain. Gastrointestinal:  Negative for blood in stool. Psychiatric/Behavioral:  Negative for self-injury and suicidal ideas. Physical Exam  Vitals and nursing note reviewed. Constitutional:       General: He is not in acute distress. Appearance: Normal appearance. He is not ill-appearing, toxic-appearing or diaphoretic. HENT:      Head: Normocephalic and atraumatic. Right Ear: External ear normal.      Left Ear: External ear normal.   Eyes:      General: No scleral icterus. Right eye: No discharge. Left eye: No discharge. Conjunctiva/sclera: Conjunctivae normal.   Cardiovascular:      Rate and Rhythm: Normal rate and regular rhythm. Heart sounds: Normal heart sounds. No murmur heard. No friction rub. No gallop. Pulmonary:      Effort: Pulmonary effort is normal. No respiratory distress. Breath sounds: Normal breath sounds. No stridor. No wheezing, rhonchi or rales. Abdominal:      General: Abdomen is flat. Bowel sounds are normal. There is no distension. Palpations: Abdomen is soft. There is no mass. Tenderness: There is no abdominal tenderness. There is no guarding or rebound. Musculoskeletal:      Right lower leg: No edema. Left lower leg: No edema. Skin:     General: Skin is warm and dry. Coloration: Skin is not jaundiced or pale. Findings: No erythema. Neurological:      General: No focal deficit present. Mental Status: He is alert and oriented to person, place, and time. Mental status is at baseline. Psychiatric:         Mood and Affect: Mood normal.         Behavior: Behavior normal.         Thought Content: Thought content normal.         Judgment: Judgment normal.   I have reviewed/discussed the above normal BMI with the patient. I have recommended the following interventions: dietary management education, guidance, and counseling and encourage exercise . ASSESSMENT AND PLAN:    Allergic Rhinitis (Acute uncomplicated illness): Trial of Singulair. Symptomatic measures discussed. Patient instructed to call us immediately if symptoms worse, change, or do not improve. CAD: [facility] 9/13/16 = GERMAINE x 2 to RCA. Follows with Cardiology (Dr. Joy Knight). Maintained on Asa. Risk factors medically modified per below. Taking medications w/o problems. Well controlled w/o angina or BLACK. Continue Asa (No bleeding or falls). Continue risk factor modification per below. Follow up with Dr. Joy Knight (Also with aortic ectasia). HTN: Taking current regimen (Amlodipine-Benazepril 5-20, Toprol XL 25) w/o problems. Home BPs = 120-130s/70s. Well controlled. Continue current regimen. Asked to monitor BP at home, call me if it runs above 140/80, and bring in a log next time. HLD: Taking current regimen (Lipitor 80) w/o problems. Well controlled. Continue current regimen. FLPs:   8/15/16 on Lipitor 80 = [90/47/22/109].    9/6/22 on Lipitor 80 = [83/34.4/27/108]. Prediabetes: Stable. Continue to monitor. A1Cs:   8/15/16 = 5.7.   3/2/22 = 6.0.   9/6/22 = 5.8. CKD3: Baseline Cr ? 1.3-1.6 and stable (Since 2017). Stable and asymptomatic. Continue to monitor. Counseled to avoid NSAIDs. Depression: Taking current regimen (Wellbutrin ) without problems and with good control of symptoms. Well controlled. Continue current regimen. HCM:   Colonoscopy: 10/29/20 by Dr. Deepti Virgen = IH; Tics; HP; 5 Year Repeat Rec. Prostate:   PSA's:  8/15/16 = 1.6.   8/20/19 = 5.7 ==> Referred to Urology, and he didn't comply. 9/6/22 = 2.170. Flu: Elsewhere 2022. F/u: 6 Months. Fasting labs at that visit. Keenan Mcdonald was seen today for hypertension, cholesterol problem, allergic rhinitis  and medicare awv. Diagnoses and all orders for this visit:    Medicare annual wellness visit, subsequent    Allergic rhinitis, unspecified seasonality, unspecified trigger  -     montelukast (SINGULAIR) 10 MG tablet; Take 1 tablet by mouth daily    ASCVD (arteriosclerotic cardiovascular disease)  -     Basic Metabolic Panel; Future  -     CBC with Auto Differential; Future  -     Hepatic Function Panel; Future    Essential hypertension  -     Basic Metabolic Panel; Future  -     CBC with Auto Differential; Future  -     Hepatic Function Panel; Future    Mixed hyperlipidemia  -     Basic Metabolic Panel; Future  -     CBC with Auto Differential; Future  -     Hepatic Function Panel; Future    Prediabetes  -     Basic Metabolic Panel; Future  -     Hemoglobin A1C; Future    Stage 3a chronic kidney disease ([facility] [address]  -     Basic Metabolic Panel; Future  -     CBC with Auto Differential; Future  -     Hepatic Function Panel; Future    Major depressive disorder with single episode, in full remission ([facility] [address]  -     buPROPion (WELLBUTRIN XL) 150 MG extended release tablet; TAKE 1 TABLET BY MOUTH DAILY    Aortic ectasia (HCC)  -     Basic Metabolic Panel;  Future  -     CBC with Auto Differential; Future  -     Hepatic Function Panel; Future    Return in about 6 months (around 9/6/2023). Medicare Annual Wellness Visit    Suze Dorado is here for Hypertension, Cholesterol Problem, Allergic Rhinitis , and Medicare AWV    Assessment & Plan   Medicare annual wellness visit, subsequent  Allergic rhinitis, unspecified seasonality, unspecified trigger  -     montelukast (SINGULAIR) 10 MG tablet; Take 1 tablet by mouth daily, Disp-90 tablet, R-1Normal  ASCVD (arteriosclerotic cardiovascular disease)  -     Basic Metabolic Panel; Future  -     CBC with Auto Differential; Future  -     Hepatic Function Panel; Future  Essential hypertension  -     Basic Metabolic Panel; Future  -     CBC with Auto Differential; Future  -     Hepatic Function Panel; Future  Mixed hyperlipidemia  -     Basic Metabolic Panel; Future  -     CBC with Auto Differential; Future  -     Hepatic Function Panel; Future  Prediabetes  -     Basic Metabolic Panel; Future  -     Hemoglobin A1C; Future  Stage 3a chronic kidney disease ([facility] [address]  -     Basic Metabolic Panel; Future  -     CBC with Auto Differential; Future  -     Hepatic Function Panel; Future  Major depressive disorder with single episode, in full remission (HCC)  -     buPROPion (WELLBUTRIN XL) 150 MG extended release tablet; TAKE 1 TABLET BY MOUTH DAILY, Disp-90 tablet, R-1Normal  Aortic ectasia (HCC)  -     Basic Metabolic Panel; Future  -     CBC with Auto Differential; Future  -     Hepatic Function Panel; Future      Recommendations for Preventive Services Due: see orders and patient instructions/AVS.  Recommended screening schedule for the next 5-10 years is provided to the patient in written form: see Patient Instructions/AVS.     Return in about 6 months (around 9/6/2023). Subjective   The following acute and/or chronic problems were also addressed today:  Allergic Rhinits.      Patient's complete Health Risk Assessment and screening values have been reviewed and are found in [facility]. The following problems were reviewed today and where indicated follow up appointments were made and/or referrals ordered. Positive Risk Factor Screenings with Interventions:                           No alcohol/drug abuse. Normal cognition and passes mini-cog. Overall health is good. No chronic pain, fatigue, depression, loneliness, social isolation, or anger issues. Getting adequate social/emotional support. Needs to be more active. Sees an eye doctor. Sees a dentist.   Hearing OK. Safe at home and has home safety equipment. No balance problems or falls. Wears seatbelt. Independent with ADLs. No LW and paperwork is offered. Objective   Vitals:    03/06/23 0912   BP: 128/69   Pulse: 61   Temp: 97 °F (36.1 °C)   TempSrc: Temporal   SpO2: 98%   Weight: 165 lb (74.8 kg)   Height: 5' 8\" (1.727 m)      Body mass index is 25.09 kg/m². No Known Allergies  Prior to Visit Medications    Medication Sig Taking?  Authorizing Provider   lansoprazole (PREVACID) 30 MG delayed release capsule  Yes Historical Provider, MD   buPROPion (WELLBUTRIN XL) 150 MG extended release tablet TAKE 1 TABLET BY MOUTH DAILY Yes Javi Rico MD   metoprolol succinate (TOPROL XL) 25 MG extended release tablet Take 1 tablet by mouth daily Yes Beba Corona MD   amLODIPine-benazepril (LOTREL) 5-20 MG per capsule Take 1 capsule by mouth daily Yes Beba Corona MD   atorvastatin (LIPITOR) 80 MG tablet Take 1 tablet by mouth daily Yes Beba Corona MD   aspirin 81 MG EC tablet Take by mouth daily Yes Ar Automatic Reconciliation   vitamin D (CHOLECALCIFEROL) 25 MCG (1000 UT) TABS tablet Take by mouth daily Yes Ar Automatic Reconciliation   nitroGLYCERIN (NITROSTAT) 0.4 MG SL tablet Place under the tongue Yes Ar Automatic Reconciliation       CareTeam (Including outside providers/suppliers regularly involved in providing care):   Patient Care Team:  Javi Rico MD as PCP - Adryan Ross MD as PCP - Empaneled Provider     Reviewed and updated this visit:  Tobacco  Allergies  Meds  Problems  Med Hx  Surg Hx  Soc Hx  Fam Hx               Génesis Laws MD

## 2023-03-07 LAB
ALBUMIN SERPL-MCNC: 3.9 G/DL (ref 3.2–4.6)
ALBUMIN/GLOB SERPL: 1.4 (ref 0.4–1.6)
ALP SERPL-CCNC: 72 U/L (ref 50–136)
ALT SERPL-CCNC: 23 U/L (ref 12–65)
ANION GAP SERPL CALC-SCNC: 7 MMOL/L (ref 2–11)
AST SERPL-CCNC: 5 U/L (ref 15–37)
BILIRUB DIRECT SERPL-MCNC: <0.1 MG/DL
BILIRUB SERPL-MCNC: 0.2 MG/DL (ref 0.2–1.1)
BUN SERPL-MCNC: 13 MG/DL (ref 8–23)
CALCIUM SERPL-MCNC: 9.2 MG/DL (ref 8.3–10.4)
CHLORIDE SERPL-SCNC: 108 MMOL/L (ref 101–110)
CO2 SERPL-SCNC: 25 MMOL/L (ref 21–32)
CREAT SERPL-MCNC: 1.4 MG/DL (ref 0.8–1.5)
GLOBULIN SER CALC-MCNC: 2.8 G/DL (ref 2.8–4.5)
GLUCOSE SERPL-MCNC: 81 MG/DL (ref 65–100)
POTASSIUM SERPL-SCNC: 4.2 MMOL/L (ref 3.5–5.1)
PROT SERPL-MCNC: 6.7 G/DL (ref 6.3–8.2)
SODIUM SERPL-SCNC: 140 MMOL/L (ref 133–143)

## 2023-03-24 DIAGNOSIS — I25.10 ASCVD (ARTERIOSCLEROTIC CARDIOVASCULAR DISEASE): ICD-10-CM

## 2023-03-24 NOTE — TELEPHONE ENCOUNTER
Requested Prescriptions     Pending Prescriptions Disp Refills    metoprolol succinate (TOPROL XL) 25 MG extended release tablet [Pharmacy Med Name: METOPROLOL ER SUCCINATE 25MG TABS] 90 tablet 3     Sig: TAKE 1 TABLET BY MOUTH DAILY

## 2023-03-28 RX ORDER — METOPROLOL SUCCINATE 25 MG/1
25 TABLET, EXTENDED RELEASE ORAL DAILY
Qty: 90 TABLET | Refills: 3 | Status: SHIPPED | OUTPATIENT
Start: 2023-03-28

## 2023-04-01 DIAGNOSIS — F32.5 MAJOR DEPRESSIVE DISORDER WITH SINGLE EPISODE, IN FULL REMISSION (HCC): ICD-10-CM

## 2023-04-03 RX ORDER — BUPROPION HYDROCHLORIDE 150 MG/1
TABLET ORAL
Qty: 30 TABLET | OUTPATIENT
Start: 2023-04-03

## 2023-04-13 ENCOUNTER — HOSPITAL ENCOUNTER (OUTPATIENT)
Dept: LAB | Age: 78
Discharge: HOME | End: 2023-04-16
Payer: MEDICARE

## 2023-04-13 DIAGNOSIS — E55.9 VITAMIN D DEFICIENCY, UNSPECIFIED: ICD-10-CM

## 2023-04-13 DIAGNOSIS — D47.2 MONOCLONAL GAMMOPATHY: ICD-10-CM

## 2023-04-13 LAB
25(OH)D3 SERPL-MCNC: 51.5 NG/ML (ref 30–100)
ALBUMIN SERPL-MCNC: 3.6 G/DL (ref 3.2–4.6)
ALBUMIN/GLOB SERPL: 1 (ref 0.4–1.6)
ALP SERPL-CCNC: 77 U/L (ref 50–136)
ALT SERPL-CCNC: 21 U/L (ref 12–65)
ANION GAP SERPL CALC-SCNC: 3 MMOL/L (ref 2–11)
AST SERPL-CCNC: 11 U/L (ref 15–37)
BASOPHILS # BLD: 0.1 K/UL (ref 0–0.2)
BASOPHILS NFR BLD: 1 % (ref 0–2)
BILIRUB SERPL-MCNC: 0.6 MG/DL (ref 0.2–1.1)
BUN SERPL-MCNC: 19 MG/DL (ref 8–23)
CALCIUM SERPL-MCNC: 9.1 MG/DL (ref 8.3–10.4)
CHLORIDE SERPL-SCNC: 109 MMOL/L (ref 101–110)
CO2 SERPL-SCNC: 27 MMOL/L (ref 21–32)
CREAT SERPL-MCNC: 1.6 MG/DL (ref 0.8–1.5)
DIFFERENTIAL METHOD BLD: ABNORMAL
EOSINOPHIL # BLD: 0.3 K/UL (ref 0–0.8)
EOSINOPHIL NFR BLD: 3 % (ref 0.5–7.8)
ERYTHROCYTE [DISTWIDTH] IN BLOOD BY AUTOMATED COUNT: 12.8 % (ref 11.9–14.6)
GLOBULIN SER CALC-MCNC: 3.6 G/DL (ref 2.8–4.5)
GLUCOSE SERPL-MCNC: 114 MG/DL (ref 65–100)
HCT VFR BLD AUTO: 41.1 % (ref 41.1–50.3)
HGB BLD-MCNC: 13.3 G/DL (ref 13.6–17.2)
IMM GRANULOCYTES # BLD AUTO: 0 K/UL (ref 0–0.5)
IMM GRANULOCYTES NFR BLD AUTO: 0 % (ref 0–5)
LYMPHOCYTES # BLD: 1.7 K/UL (ref 0.5–4.6)
LYMPHOCYTES NFR BLD: 19 % (ref 13–44)
MCH RBC QN AUTO: 30.3 PG (ref 26.1–32.9)
MCHC RBC AUTO-ENTMCNC: 32.4 G/DL (ref 31.4–35)
MCV RBC AUTO: 93.6 FL (ref 82–102)
MONOCYTES # BLD: 0.9 K/UL (ref 0.1–1.3)
MONOCYTES NFR BLD: 10 % (ref 4–12)
NEUTS SEG # BLD: 6.2 K/UL (ref 1.7–8.2)
NEUTS SEG NFR BLD: 67 % (ref 43–78)
NRBC # BLD: 0 K/UL (ref 0–0.2)
PLATELET # BLD AUTO: 228 K/UL (ref 150–450)
PMV BLD AUTO: 11.1 FL (ref 9.4–12.3)
POTASSIUM SERPL-SCNC: 4.4 MMOL/L (ref 3.5–5.1)
PROT SERPL-MCNC: 7.2 G/DL (ref 6.3–8.2)
RBC # BLD AUTO: 4.39 M/UL (ref 4.23–5.6)
SODIUM SERPL-SCNC: 139 MMOL/L (ref 133–143)
WBC # BLD AUTO: 9.2 K/UL (ref 4.3–11.1)

## 2023-04-13 PROCEDURE — 36415 COLL VENOUS BLD VENIPUNCTURE: CPT

## 2023-04-13 PROCEDURE — 82306 VITAMIN D 25 HYDROXY: CPT

## 2023-04-13 PROCEDURE — 83521 IG LIGHT CHAINS FREE EACH: CPT

## 2023-04-13 PROCEDURE — 82784 ASSAY IGA/IGD/IGG/IGM EACH: CPT

## 2023-04-13 PROCEDURE — 85025 COMPLETE CBC W/AUTO DIFF WBC: CPT

## 2023-04-14 LAB
KAPPA LC FREE SER-MCNC: 53.8 MG/L (ref 3.3–19.4)
KAPPA LC FREE/LAMBDA FREE SER: 2.23 (ref 0.26–1.65)
LAMBDA LC FREE SERPL-MCNC: 24.1 MG/L (ref 5.7–26.3)

## 2023-04-17 LAB
ALBUMIN SERPL ELPH-MCNC: 3.7 G/DL (ref 2.9–4.4)
ALBUMIN/GLOB SERPL: 1.3 (ref 0.7–1.7)
ALPHA1 GLOB SERPL ELPH-MCNC: 0.3 G/DL (ref 0–0.4)
ALPHA2 GLOB SERPL ELPH-MCNC: 0.8 G/DL (ref 0.4–1)
B-GLOBULIN SERPL ELPH-MCNC: 1 G/DL (ref 0.7–1.3)
GAMMA GLOB SERPL ELPH-MCNC: 0.9 G/DL (ref 0.4–1.8)
GLOBULIN SER-MCNC: 3 G/DL (ref 2.2–3.9)
IGA SERPL-MCNC: 295 MG/DL (ref 61–437)
IGG SERPL-MCNC: 783 MG/DL (ref 603–1613)
IGM SERPL-MCNC: 295 MG/DL (ref 15–143)
INTERPRETATION SERPL IEP-IMP: ABNORMAL
M PROTEIN SERPL ELPH-MCNC: 0.2 G/DL
PROT SERPL-MCNC: 6.7 G/DL (ref 6–8.5)

## 2023-07-12 ENCOUNTER — OFFICE VISIT (OUTPATIENT)
Dept: RHEUMATOLOGY | Age: 78
End: 2023-07-12
Payer: MEDICARE

## 2023-07-12 VITALS
WEIGHT: 167 LBS | BODY MASS INDEX: 25.31 KG/M2 | RESPIRATION RATE: 14 BRPM | DIASTOLIC BLOOD PRESSURE: 76 MMHG | SYSTOLIC BLOOD PRESSURE: 124 MMHG | HEIGHT: 68 IN | HEART RATE: 64 BPM

## 2023-07-12 DIAGNOSIS — G89.29 CHRONIC BACK PAIN GREATER THAN 3 MONTHS DURATION: ICD-10-CM

## 2023-07-12 DIAGNOSIS — M54.9 CHRONIC BACK PAIN GREATER THAN 3 MONTHS DURATION: ICD-10-CM

## 2023-07-12 DIAGNOSIS — Z15.89 HLA B27 (HLA B27 POSITIVE): Primary | ICD-10-CM

## 2023-07-12 PROCEDURE — 1123F ACP DISCUSS/DSCN MKR DOCD: CPT | Performed by: INTERNAL MEDICINE

## 2023-07-12 PROCEDURE — 99214 OFFICE O/P EST MOD 30 MIN: CPT | Performed by: INTERNAL MEDICINE

## 2023-07-12 PROCEDURE — G8417 CALC BMI ABV UP PARAM F/U: HCPCS | Performed by: INTERNAL MEDICINE

## 2023-07-12 PROCEDURE — 3074F SYST BP LT 130 MM HG: CPT | Performed by: INTERNAL MEDICINE

## 2023-07-12 PROCEDURE — 1036F TOBACCO NON-USER: CPT | Performed by: INTERNAL MEDICINE

## 2023-07-12 PROCEDURE — 3078F DIAST BP <80 MM HG: CPT | Performed by: INTERNAL MEDICINE

## 2023-07-12 PROCEDURE — G8427 DOCREV CUR MEDS BY ELIG CLIN: HCPCS | Performed by: INTERNAL MEDICINE

## 2023-07-12 PROCEDURE — 3017F COLORECTAL CA SCREEN DOC REV: CPT | Performed by: INTERNAL MEDICINE

## 2023-07-12 ASSESSMENT — ROUTINE ASSESSMENT OF PATIENT INDEX DATA (RAPID3)
ON A SCALE OF ONE TO TEN, CONSIDERING ALL THE WAYS IN WHICH ILLNESS AND HEALTH CONDITIONS MAY AFFECT YOU AT THIS TIME, PLEASE INDICATE BELOW HOW YOU ARE DOING:: 2
ON A SCALE OF ONE TO TEN, HOW MUCH PAIN HAVE YOU HAD BECAUSE OF YOUR CONDITION OVER THE PAST WEEK?: 1
WHEN YOU AWAKENED IN THE MORNING OVER THE LAST WEEK, PLEASE INDICATE THE AMOUNT OF TIME IT TAKES UNTIL YOU ARE AS LIMBER AS YOU WILL BE FOR THE DAY: < 10 MIN
ON A SCALE OF ONE TO TEN, HOW MUCH OF A PROBLEM HAS UNUSUAL FATIGUE OR TIREDNESS BEEN FOR YOU OVER THE PAST WEEK?: 2
ON A SCALE OF ONE TO TEN, HOW DIFFICULT WAS IT FOR YOU TO COMPLETE THE LISTED DAILY PHYSICAL TASKS OVER THE LAST WEEK: 0.3

## 2023-07-12 NOTE — PROGRESS NOTES
Rashes   Weakness   Problems with urination   Feeling anxious    Sun sensitivity   Headaches  X None of above  x Problems sleeping    None of above  x None of above      None of above           The following portions of the patient's history were reviewed and updated as appropriate:   Current Outpatient Medications   Medication Sig Dispense Refill    loratadine (CLARITIN) 10 MG capsule Take 1 capsule by mouth daily      metoprolol succinate (TOPROL XL) 25 MG extended release tablet TAKE 1 TABLET BY MOUTH DAILY 90 tablet 3    lansoprazole (PREVACID) 30 MG delayed release capsule       buPROPion (WELLBUTRIN XL) 150 MG extended release tablet TAKE 1 TABLET BY MOUTH DAILY 90 tablet 1    montelukast (SINGULAIR) 10 MG tablet Take 1 tablet by mouth daily 90 tablet 1    amLODIPine-benazepril (LOTREL) 5-20 MG per capsule Take 1 capsule by mouth daily 90 capsule 3    atorvastatin (LIPITOR) 80 MG tablet Take 1 tablet by mouth daily 90 tablet 3    aspirin 81 MG EC tablet Take by mouth daily      vitamin D (CHOLECALCIFEROL) 25 MCG (1000 UT) TABS tablet Take by mouth daily      nitroGLYCERIN (NITROSTAT) 0.4 MG SL tablet Place under the tongue (Patient not taking: Reported on 4/13/2023)       No current facility-administered medications for this visit. PHYSICAL EXAM:  Vitals:    07/12/23 1223   BP: 124/76   Pulse: 64   Resp: 14   Weight: 167 lb (75.8 kg)   Height: 5' 8\" (1.727 m)         CONSTITUTIONAL:  The patient was in NAD.  ENT:  The OPC, MMM, lips/teeth/gums without abnormalities. NECK:  No masses or thyromegaly  LYMPH NODES:  No cervical or axillary lymphadenopathy. CV:  RRR no r/m/g. Normal peripheral pulses  RESP:  CTA bilaterally. Normal respiratory effort. GI:  Abdomen soft, non tender, no hepatosplenomegaly  Skin:  No rashes, nodules, or other lesions.     MUSCULOSKELETAL :  All joints including neck, back bilateral shoulders, elbows, wrists, MCP's, PIP's, DIP's, hips, knees, ankles, toes are within normal

## 2023-09-06 ENCOUNTER — OFFICE VISIT (OUTPATIENT)
Dept: INTERNAL MEDICINE CLINIC | Facility: CLINIC | Age: 78
End: 2023-09-06
Payer: MEDICARE

## 2023-09-06 VITALS
WEIGHT: 167 LBS | HEIGHT: 68 IN | TEMPERATURE: 97 F | HEART RATE: 60 BPM | OXYGEN SATURATION: 99 % | DIASTOLIC BLOOD PRESSURE: 70 MMHG | SYSTOLIC BLOOD PRESSURE: 123 MMHG | BODY MASS INDEX: 25.31 KG/M2

## 2023-09-06 DIAGNOSIS — R97.20 ELEVATED PSA: ICD-10-CM

## 2023-09-06 DIAGNOSIS — Z23 ENCOUNTER FOR ADMINISTRATION OF VACCINE: ICD-10-CM

## 2023-09-06 DIAGNOSIS — R73.03 PREDIABETES: ICD-10-CM

## 2023-09-06 DIAGNOSIS — F32.5 MAJOR DEPRESSIVE DISORDER WITH SINGLE EPISODE, IN FULL REMISSION (HCC): ICD-10-CM

## 2023-09-06 DIAGNOSIS — N18.31 STAGE 3A CHRONIC KIDNEY DISEASE (HCC): ICD-10-CM

## 2023-09-06 DIAGNOSIS — Z15.89 HLA B27 (HLA B27 POSITIVE): ICD-10-CM

## 2023-09-06 DIAGNOSIS — E78.2 MIXED HYPERLIPIDEMIA: ICD-10-CM

## 2023-09-06 DIAGNOSIS — I77.819 AORTIC ECTASIA (HCC): ICD-10-CM

## 2023-09-06 DIAGNOSIS — I10 ESSENTIAL HYPERTENSION: ICD-10-CM

## 2023-09-06 DIAGNOSIS — I25.10 ASCVD (ARTERIOSCLEROTIC CARDIOVASCULAR DISEASE): ICD-10-CM

## 2023-09-06 DIAGNOSIS — M54.9 CHRONIC BACK PAIN GREATER THAN 3 MONTHS DURATION: ICD-10-CM

## 2023-09-06 DIAGNOSIS — I25.10 ASCVD (ARTERIOSCLEROTIC CARDIOVASCULAR DISEASE): Primary | ICD-10-CM

## 2023-09-06 DIAGNOSIS — J30.9 ALLERGIC RHINITIS, UNSPECIFIED SEASONALITY, UNSPECIFIED TRIGGER: ICD-10-CM

## 2023-09-06 DIAGNOSIS — G89.29 CHRONIC BACK PAIN GREATER THAN 3 MONTHS DURATION: ICD-10-CM

## 2023-09-06 LAB
ALBUMIN SERPL-MCNC: 3.9 G/DL (ref 3.2–4.6)
ALBUMIN/GLOB SERPL: 1.2 (ref 0.4–1.6)
ALP SERPL-CCNC: 81 U/L (ref 50–136)
ALT SERPL-CCNC: 23 U/L (ref 12–65)
ANION GAP SERPL CALC-SCNC: 5 MMOL/L (ref 2–11)
APPEARANCE UR: CLEAR
AST SERPL-CCNC: 8 U/L (ref 15–37)
BASOPHILS # BLD: 0.1 K/UL (ref 0–0.2)
BASOPHILS NFR BLD: 1 % (ref 0–2)
BILIRUB DIRECT SERPL-MCNC: 0.2 MG/DL
BILIRUB SERPL-MCNC: 0.6 MG/DL (ref 0.2–1.1)
BILIRUB UR QL: NEGATIVE
BUN SERPL-MCNC: 19 MG/DL (ref 8–23)
CALCIUM SERPL-MCNC: 9.4 MG/DL (ref 8.3–10.4)
CHLORIDE SERPL-SCNC: 108 MMOL/L (ref 101–110)
CHOLEST SERPL-MCNC: 104 MG/DL
CO2 SERPL-SCNC: 29 MMOL/L (ref 21–32)
COLOR UR: NORMAL
CREAT SERPL-MCNC: 1.6 MG/DL (ref 0.8–1.5)
CRP SERPL-MCNC: <0.3 MG/DL (ref 0–0.9)
DIFFERENTIAL METHOD BLD: ABNORMAL
EOSINOPHIL # BLD: 0.2 K/UL (ref 0–0.8)
EOSINOPHIL NFR BLD: 2 % (ref 0.5–7.8)
ERYTHROCYTE [DISTWIDTH] IN BLOOD BY AUTOMATED COUNT: 13.1 % (ref 11.9–14.6)
ERYTHROCYTE [SEDIMENTATION RATE] IN BLOOD: 7 MM/HR
GLOBULIN SER CALC-MCNC: 3.2 G/DL (ref 2.8–4.5)
GLUCOSE SERPL-MCNC: 107 MG/DL (ref 65–100)
GLUCOSE UR STRIP.AUTO-MCNC: NEGATIVE MG/DL
HCT VFR BLD AUTO: 41 % (ref 41.1–50.3)
HDLC SERPL-MCNC: 31 MG/DL (ref 40–60)
HDLC SERPL: 3.4
HGB BLD-MCNC: 13.1 G/DL (ref 13.6–17.2)
HGB UR QL STRIP: NEGATIVE
IMM GRANULOCYTES # BLD AUTO: 0 K/UL (ref 0–0.5)
IMM GRANULOCYTES NFR BLD AUTO: 0 % (ref 0–5)
KETONES UR QL STRIP.AUTO: NEGATIVE MG/DL
LDLC SERPL CALC-MCNC: 52.2 MG/DL
LEUKOCYTE ESTERASE UR QL STRIP.AUTO: NEGATIVE
LYMPHOCYTES # BLD: 2.5 K/UL (ref 0.5–4.6)
LYMPHOCYTES NFR BLD: 25 % (ref 13–44)
MCH RBC QN AUTO: 30.5 PG (ref 26.1–32.9)
MCHC RBC AUTO-ENTMCNC: 32 G/DL (ref 31.4–35)
MCV RBC AUTO: 95.3 FL (ref 82–102)
MONOCYTES # BLD: 1.1 K/UL (ref 0.1–1.3)
MONOCYTES NFR BLD: 10 % (ref 4–12)
NEUTS SEG # BLD: 6.4 K/UL (ref 1.7–8.2)
NEUTS SEG NFR BLD: 62 % (ref 43–78)
NITRITE UR QL STRIP.AUTO: NEGATIVE
NRBC # BLD: 0 K/UL (ref 0–0.2)
PH UR STRIP: 5.5 (ref 5–9)
PLATELET # BLD AUTO: 229 K/UL (ref 150–450)
PMV BLD AUTO: 11.7 FL (ref 9.4–12.3)
POTASSIUM SERPL-SCNC: 4.9 MMOL/L (ref 3.5–5.1)
PROT SERPL-MCNC: 7.1 G/DL (ref 6.3–8.2)
PROT UR STRIP-MCNC: NEGATIVE MG/DL
RBC # BLD AUTO: 4.3 M/UL (ref 4.23–5.6)
SODIUM SERPL-SCNC: 142 MMOL/L (ref 133–143)
SP GR UR REFRACTOMETRY: 1.01 (ref 1–1.02)
TRIGL SERPL-MCNC: 104 MG/DL (ref 35–150)
TSH, 3RD GENERATION: 1.76 UIU/ML (ref 0.36–3.74)
UROBILINOGEN UR QL STRIP.AUTO: 0.2 EU/DL (ref 0.2–1)
VLDLC SERPL CALC-MCNC: 20.8 MG/DL (ref 6–23)
WBC # BLD AUTO: 10.3 K/UL (ref 4.3–11.1)

## 2023-09-06 PROCEDURE — G8427 DOCREV CUR MEDS BY ELIG CLIN: HCPCS | Performed by: INTERNAL MEDICINE

## 2023-09-06 PROCEDURE — 3017F COLORECTAL CA SCREEN DOC REV: CPT | Performed by: INTERNAL MEDICINE

## 2023-09-06 PROCEDURE — 3078F DIAST BP <80 MM HG: CPT | Performed by: INTERNAL MEDICINE

## 2023-09-06 PROCEDURE — 1123F ACP DISCUSS/DSCN MKR DOCD: CPT | Performed by: INTERNAL MEDICINE

## 2023-09-06 PROCEDURE — 3074F SYST BP LT 130 MM HG: CPT | Performed by: INTERNAL MEDICINE

## 2023-09-06 PROCEDURE — G8417 CALC BMI ABV UP PARAM F/U: HCPCS | Performed by: INTERNAL MEDICINE

## 2023-09-06 PROCEDURE — 1036F TOBACCO NON-USER: CPT | Performed by: INTERNAL MEDICINE

## 2023-09-06 PROCEDURE — 99214 OFFICE O/P EST MOD 30 MIN: CPT | Performed by: INTERNAL MEDICINE

## 2023-09-06 RX ORDER — MONTELUKAST SODIUM 10 MG/1
10 TABLET ORAL DAILY
Qty: 90 TABLET | Refills: 1 | Status: SHIPPED | OUTPATIENT
Start: 2023-09-06

## 2023-09-06 RX ORDER — BUPROPION HYDROCHLORIDE 150 MG/1
150 TABLET ORAL EVERY MORNING
Qty: 90 TABLET | Refills: 1 | Status: SHIPPED | OUTPATIENT
Start: 2023-09-06

## 2023-09-06 SDOH — ECONOMIC STABILITY: FOOD INSECURITY: WITHIN THE PAST 12 MONTHS, THE FOOD YOU BOUGHT JUST DIDN'T LAST AND YOU DIDN'T HAVE MONEY TO GET MORE.: NEVER TRUE

## 2023-09-06 SDOH — ECONOMIC STABILITY: FOOD INSECURITY: WITHIN THE PAST 12 MONTHS, YOU WORRIED THAT YOUR FOOD WOULD RUN OUT BEFORE YOU GOT MONEY TO BUY MORE.: NEVER TRUE

## 2023-09-06 SDOH — ECONOMIC STABILITY: INCOME INSECURITY: HOW HARD IS IT FOR YOU TO PAY FOR THE VERY BASICS LIKE FOOD, HOUSING, MEDICAL CARE, AND HEATING?: NOT HARD AT ALL

## 2023-09-06 ASSESSMENT — PATIENT HEALTH QUESTIONNAIRE - PHQ9
3. TROUBLE FALLING OR STAYING ASLEEP: 0
10. IF YOU CHECKED OFF ANY PROBLEMS, HOW DIFFICULT HAVE THESE PROBLEMS MADE IT FOR YOU TO DO YOUR WORK, TAKE CARE OF THINGS AT HOME, OR GET ALONG WITH OTHER PEOPLE: 0
2. FEELING DOWN, DEPRESSED OR HOPELESS: 0
4. FEELING TIRED OR HAVING LITTLE ENERGY: 0
SUM OF ALL RESPONSES TO PHQ QUESTIONS 1-9: 0
1. LITTLE INTEREST OR PLEASURE IN DOING THINGS: 0
7. TROUBLE CONCENTRATING ON THINGS, SUCH AS READING THE NEWSPAPER OR WATCHING TELEVISION: 0
5. POOR APPETITE OR OVEREATING: 0
6. FEELING BAD ABOUT YOURSELF - OR THAT YOU ARE A FAILURE OR HAVE LET YOURSELF OR YOUR FAMILY DOWN: 0
9. THOUGHTS THAT YOU WOULD BE BETTER OFF DEAD, OR OF HURTING YOURSELF: 0
SUM OF ALL RESPONSES TO PHQ QUESTIONS 1-9: 0
8. MOVING OR SPEAKING SO SLOWLY THAT OTHER PEOPLE COULD HAVE NOTICED. OR THE OPPOSITE, BEING SO FIGETY OR RESTLESS THAT YOU HAVE BEEN MOVING AROUND A LOT MORE THAN USUAL: 0
SUM OF ALL RESPONSES TO PHQ QUESTIONS 1-9: 0
SUM OF ALL RESPONSES TO PHQ9 QUESTIONS 1 & 2: 0
SUM OF ALL RESPONSES TO PHQ QUESTIONS 1-9: 0

## 2023-09-06 ASSESSMENT — ANXIETY QUESTIONNAIRES
6. BECOMING EASILY ANNOYED OR IRRITABLE: 0
IF YOU CHECKED OFF ANY PROBLEMS ON THIS QUESTIONNAIRE, HOW DIFFICULT HAVE THESE PROBLEMS MADE IT FOR YOU TO DO YOUR WORK, TAKE CARE OF THINGS AT HOME, OR GET ALONG WITH OTHER PEOPLE: NOT DIFFICULT AT ALL
3. WORRYING TOO MUCH ABOUT DIFFERENT THINGS: 0
5. BEING SO RESTLESS THAT IT IS HARD TO SIT STILL: 0
1. FEELING NERVOUS, ANXIOUS, OR ON EDGE: 0
7. FEELING AFRAID AS IF SOMETHING AWFUL MIGHT HAPPEN: 0
2. NOT BEING ABLE TO STOP OR CONTROL WORRYING: 0
4. TROUBLE RELAXING: 0
GAD7 TOTAL SCORE: 0

## 2023-09-06 ASSESSMENT — ENCOUNTER SYMPTOMS
BLOOD IN STOOL: 0
SHORTNESS OF BREATH: 0

## 2023-09-06 NOTE — PROGRESS NOTES
Vadim Garcia M.D. Internal Medicine  [facility]  [address] [facility], [address] [place] [address]  Phone: [phone] Fax: [phone]    Hypertension  This is a chronic problem. The current episode started more than 1 year ago. The problem is unchanged. The problem is controlled. Pertinent negatives include no chest pain or shortness of breath. There are no associated agents to hypertension. Risk factors for coronary artery disease include male gender and dyslipidemia. Past treatments include beta blockers, calcium channel blockers and alpha 1 blockers. The current treatment provides moderate improvement. Jose Enrique Celeste is a 78 y.o. White (non-) male. Current Outpatient Medications   Medication Sig Dispense Refill    loratadine (CLARITIN) 10 MG capsule Take 1 capsule by mouth daily      metoprolol succinate (TOPROL XL) 25 MG extended release tablet TAKE 1 TABLET BY MOUTH DAILY 90 tablet 3    lansoprazole (PREVACID) 30 MG delayed release capsule       buPROPion (WELLBUTRIN XL) 150 MG extended release tablet TAKE 1 TABLET BY MOUTH DAILY 90 tablet 1    montelukast (SINGULAIR) 10 MG tablet Take 1 tablet by mouth daily 90 tablet 1    amLODIPine-benazepril (LOTREL) 5-20 MG per capsule Take 1 capsule by mouth daily 90 capsule 3    atorvastatin (LIPITOR) 80 MG tablet Take 1 tablet by mouth daily 90 tablet 3    aspirin 81 MG EC tablet Take by mouth daily      vitamin D (CHOLECALCIFEROL) 25 MCG (1000 UT) TABS tablet Take by mouth daily      nitroGLYCERIN (NITROSTAT) 0.4 MG SL tablet Place under the tongue       No current facility-administered medications for this visit.      No Known Allergies    Past Medical History:   Diagnosis Date    CAD (coronary artery disease)     Dr Richmond Stake  stents x 2    CKD (chronic kidney disease)     Esophageal ring     s/p dilation Nov 2020    GERD (gastroesophageal reflux disease)     HLD (hyperlipidemia)     HTN (hypertension)     Ill-defined condition

## 2023-09-06 NOTE — PATIENT INSTRUCTIONS
Please arrive 20 minutes prior to your scheduled time to see the doctor to allow sufficient time for check-in and rooming. Please bring all your prescription bottles to each appointment. I recommend all standard healthcare maintenance and cancer screenings (Colon cancer screening if due, Lung cancer screening if due, Mammogram and Bone Density if female and appropriate, etc) and standard immunizations (Flu, Pneumonia, Covid-19, Shingrix, Tetanus boosters, etc) as appropriate and due to lower your risk for potentially preventable morbidity/mortality from these diseases. Check BP 1-2 times per week. Call our office if BP runs above 140/80. Recommend tobacco cessation if you use tobacco products.

## 2023-09-07 LAB
EST. AVERAGE GLUCOSE BLD GHB EST-MCNC: 120 MG/DL
HBA1C MFR BLD: 5.8 % (ref 4.8–5.6)

## 2023-09-08 LAB — PSA SERPL DL<=0.01 NG/ML-MCNC: 1.52 NG/ML (ref 0–4)

## 2023-09-13 ENCOUNTER — TELEPHONE (OUTPATIENT)
Dept: RHEUMATOLOGY | Age: 78
End: 2023-09-13

## 2023-09-13 NOTE — TELEPHONE ENCOUNTER
----- Message from Virgel Scheuermann, MD sent at 9/12/2023  3:32 PM EDT -----  Please let pt know inflammatory markers are normal.

## 2023-09-13 NOTE — TELEPHONE ENCOUNTER
Morro.  He had no questions at this time. Confirmed that he does have next follow up scheduled, pt wcb with any changes or concerns.

## 2023-10-09 DIAGNOSIS — F32.5 MAJOR DEPRESSIVE DISORDER WITH SINGLE EPISODE, IN FULL REMISSION (HCC): ICD-10-CM

## 2023-10-09 RX ORDER — BUPROPION HYDROCHLORIDE 150 MG/1
150 TABLET ORAL DAILY
Qty: 90 TABLET | Refills: 1 | OUTPATIENT
Start: 2023-10-09

## 2024-01-17 ENCOUNTER — OFFICE VISIT (OUTPATIENT)
Age: 79
End: 2024-01-17
Payer: MEDICARE

## 2024-01-17 VITALS
BODY MASS INDEX: 26.22 KG/M2 | DIASTOLIC BLOOD PRESSURE: 72 MMHG | SYSTOLIC BLOOD PRESSURE: 120 MMHG | HEART RATE: 59 BPM | HEIGHT: 68 IN | WEIGHT: 173 LBS

## 2024-01-17 DIAGNOSIS — I10 PRIMARY HYPERTENSION: ICD-10-CM

## 2024-01-17 DIAGNOSIS — I25.10 ASCVD (ARTERIOSCLEROTIC CARDIOVASCULAR DISEASE): Primary | ICD-10-CM

## 2024-01-17 PROCEDURE — G8417 CALC BMI ABV UP PARAM F/U: HCPCS | Performed by: INTERNAL MEDICINE

## 2024-01-17 PROCEDURE — 99214 OFFICE O/P EST MOD 30 MIN: CPT | Performed by: INTERNAL MEDICINE

## 2024-01-17 PROCEDURE — 1036F TOBACCO NON-USER: CPT | Performed by: INTERNAL MEDICINE

## 2024-01-17 PROCEDURE — 93000 ELECTROCARDIOGRAM COMPLETE: CPT | Performed by: INTERNAL MEDICINE

## 2024-01-17 PROCEDURE — 3074F SYST BP LT 130 MM HG: CPT | Performed by: INTERNAL MEDICINE

## 2024-01-17 PROCEDURE — G8428 CUR MEDS NOT DOCUMENT: HCPCS | Performed by: INTERNAL MEDICINE

## 2024-01-17 PROCEDURE — G8484 FLU IMMUNIZE NO ADMIN: HCPCS | Performed by: INTERNAL MEDICINE

## 2024-01-17 PROCEDURE — 3078F DIAST BP <80 MM HG: CPT | Performed by: INTERNAL MEDICINE

## 2024-01-17 PROCEDURE — 1123F ACP DISCUSS/DSCN MKR DOCD: CPT | Performed by: INTERNAL MEDICINE

## 2024-01-17 RX ORDER — AMLODIPINE BESYLATE AND BENAZEPRIL HYDROCHLORIDE 5; 20 MG/1; MG/1
1 CAPSULE ORAL DAILY
Qty: 90 CAPSULE | Refills: 3 | Status: SHIPPED | OUTPATIENT
Start: 2024-01-17

## 2024-01-17 RX ORDER — ATORVASTATIN CALCIUM 80 MG/1
80 TABLET, FILM COATED ORAL DAILY
Qty: 90 TABLET | Refills: 3 | Status: SHIPPED | OUTPATIENT
Start: 2024-01-17

## 2024-01-17 RX ORDER — NITROGLYCERIN 0.4 MG/1
0.4 TABLET SUBLINGUAL EVERY 5 MIN PRN
Qty: 25 TABLET | Refills: 0 | Status: SHIPPED | OUTPATIENT
Start: 2024-01-17

## 2024-01-17 RX ORDER — METOPROLOL SUCCINATE 25 MG/1
25 TABLET, EXTENDED RELEASE ORAL DAILY
Qty: 90 TABLET | Refills: 3 | Status: SHIPPED | OUTPATIENT
Start: 2024-01-17

## 2024-01-17 NOTE — PROGRESS NOTES
[facility] CARDIOLOGY  [address] [place] [address], [address]  [place] [address]  PHONE: [phone]        24        NAME:  Brendon Tena  : 1945  MRN: [id number]     Ascvd  Htn  Ckd    CHIEF COMPLAINT:    Coronary Artery Disease      SUBJECTIVE:       No chest pain or palpitation or dizziness.       Medications were all reviewed with the patient today and updated as necessary.   Current Outpatient Medications   Medication Sig    montelukast (SINGULAIR) 10 MG tablet Take 1 tablet by mouth daily    buPROPion (WELLBUTRIN XL) 150 MG extended release tablet Take 1 tablet by mouth every morning    loratadine (CLARITIN) 10 MG capsule Take 1 capsule by mouth daily    metoprolol succinate (TOPROL XL) 25 MG extended release tablet TAKE 1 TABLET BY MOUTH DAILY    lansoprazole (PREVACID) 30 MG delayed release capsule     amLODIPine-benazepril (LOTREL) 5-20 MG per capsule Take 1 capsule by mouth daily    atorvastatin (LIPITOR) 80 MG tablet Take 1 tablet by mouth daily    aspirin 81 MG EC tablet Take by mouth daily    vitamin D (CHOLECALCIFEROL) 25 MCG (1000 UT) TABS tablet Take by mouth daily    nitroGLYCERIN (NITROSTAT) 0.4 MG SL tablet Place under the tongue     No current facility-administered medications for this visit.        No Known Allergies        PHYSICAL EXAM:     Wt Readings from Last 3 Encounters:   24 78.5 kg (173 lb)   23 75.8 kg (167 lb)   23 75.8 kg (167 lb)     BP Readings from Last 3 Encounters:   24 120/72   23 123/70   23 124/76       /72   Pulse 59   Ht 1.727 m (5' 8\")   Wt 78.5 kg (173 lb)   BMI 26.30 kg/m²     Physical Exam  Vitals reviewed.   HENT:      Head: Normocephalic and atraumatic.   Eyes:      Extraocular Movements: Extraocular movements intact.      Pupils: Pupils are equal, round, and reactive to light.   Cardiovascular:      Rate and Rhythm: Normal rate.      Heart sounds: Normal heart sounds.   Pulmonary:      Effort:

## 2024-02-13 NOTE — TELEPHONE ENCOUNTER
Requested Prescriptions     Pending Prescriptions Disp Refills    nitroGLYCERIN (NITROSTAT) 0.4 MG SL tablet [Pharmacy Med Name: NITROGLYCERIN 0.4MG SUB TAB 25S] 25 tablet 0     Sig: DISSOLVE 1 TABLET UNDER THE TONGUE EVERY 5 MINUTES AS NEEDED FOR CHEST PAIN

## 2024-02-14 RX ORDER — NITROGLYCERIN 0.4 MG/1
TABLET SUBLINGUAL
Qty: 25 TABLET | Refills: 0 | Status: SHIPPED | OUTPATIENT
Start: 2024-02-14

## 2024-03-06 ENCOUNTER — OFFICE VISIT (OUTPATIENT)
Dept: INTERNAL MEDICINE CLINIC | Facility: CLINIC | Age: 79
End: 2024-03-06
Payer: MEDICARE

## 2024-03-06 VITALS
DIASTOLIC BLOOD PRESSURE: 64 MMHG | SYSTOLIC BLOOD PRESSURE: 121 MMHG | OXYGEN SATURATION: 98 % | HEIGHT: 68 IN | TEMPERATURE: 97.2 F | WEIGHT: 171 LBS | BODY MASS INDEX: 25.91 KG/M2 | HEART RATE: 60 BPM

## 2024-03-06 DIAGNOSIS — R97.20 ELEVATED PSA: ICD-10-CM

## 2024-03-06 DIAGNOSIS — I10 ESSENTIAL HYPERTENSION: ICD-10-CM

## 2024-03-06 DIAGNOSIS — R73.03 PREDIABETES: ICD-10-CM

## 2024-03-06 DIAGNOSIS — E78.2 MIXED HYPERLIPIDEMIA: ICD-10-CM

## 2024-03-06 DIAGNOSIS — I25.10 ASCVD (ARTERIOSCLEROTIC CARDIOVASCULAR DISEASE): ICD-10-CM

## 2024-03-06 DIAGNOSIS — N18.31 STAGE 3A CHRONIC KIDNEY DISEASE (HCC): ICD-10-CM

## 2024-03-06 DIAGNOSIS — I77.819 AORTIC ECTASIA (HCC): ICD-10-CM

## 2024-03-06 DIAGNOSIS — J30.9 ALLERGIC RHINITIS, UNSPECIFIED SEASONALITY, UNSPECIFIED TRIGGER: ICD-10-CM

## 2024-03-06 DIAGNOSIS — F32.5 MAJOR DEPRESSIVE DISORDER WITH SINGLE EPISODE, IN FULL REMISSION (HCC): ICD-10-CM

## 2024-03-06 DIAGNOSIS — Z00.00 MEDICARE ANNUAL WELLNESS VISIT, SUBSEQUENT: Primary | ICD-10-CM

## 2024-03-06 PROBLEM — D47.2 MGUS (MONOCLONAL GAMMOPATHY OF UNKNOWN SIGNIFICANCE): Status: ACTIVE | Noted: 2024-03-06

## 2024-03-06 PROBLEM — Z15.89 HLA B27 (HLA B27 POSITIVE): Status: ACTIVE | Noted: 2024-03-06

## 2024-03-06 LAB
ALBUMIN SERPL-MCNC: 3.4 G/DL (ref 3.2–4.6)
ALBUMIN/GLOB SERPL: 1 (ref 0.4–1.6)
ALP SERPL-CCNC: 73 U/L (ref 50–136)
ALT SERPL-CCNC: 20 U/L (ref 12–65)
ANION GAP SERPL CALC-SCNC: 5 MMOL/L (ref 2–11)
AST SERPL-CCNC: 10 U/L (ref 15–37)
BASOPHILS # BLD: 0 K/UL (ref 0–0.2)
BASOPHILS NFR BLD: 0 % (ref 0–2)
BILIRUB DIRECT SERPL-MCNC: 0.2 MG/DL
BILIRUB SERPL-MCNC: 0.5 MG/DL (ref 0.2–1.1)
BUN SERPL-MCNC: 15 MG/DL (ref 8–23)
CALCIUM SERPL-MCNC: 9.3 MG/DL (ref 8.3–10.4)
CHLORIDE SERPL-SCNC: 107 MMOL/L (ref 103–113)
CO2 SERPL-SCNC: 28 MMOL/L (ref 21–32)
CREAT SERPL-MCNC: 1.4 MG/DL (ref 0.8–1.5)
DIFFERENTIAL METHOD BLD: ABNORMAL
EOSINOPHIL # BLD: 0.3 K/UL (ref 0–0.8)
EOSINOPHIL NFR BLD: 3 % (ref 0.5–7.8)
ERYTHROCYTE [DISTWIDTH] IN BLOOD BY AUTOMATED COUNT: 13.2 % (ref 11.9–14.6)
GLOBULIN SER CALC-MCNC: 3.3 G/DL (ref 2.8–4.5)
GLUCOSE SERPL-MCNC: 109 MG/DL (ref 65–100)
HCT VFR BLD AUTO: 38 % (ref 41.1–50.3)
HGB BLD-MCNC: 12.1 G/DL (ref 13.6–17.2)
IMM GRANULOCYTES # BLD AUTO: 0 K/UL (ref 0–0.5)
IMM GRANULOCYTES NFR BLD AUTO: 0 % (ref 0–5)
LYMPHOCYTES # BLD: 2.3 K/UL (ref 0.5–4.6)
LYMPHOCYTES NFR BLD: 24 % (ref 13–44)
MCH RBC QN AUTO: 30.3 PG (ref 26.1–32.9)
MCHC RBC AUTO-ENTMCNC: 31.8 G/DL (ref 31.4–35)
MCV RBC AUTO: 95.2 FL (ref 82–102)
MONOCYTES # BLD: 1.1 K/UL (ref 0.1–1.3)
MONOCYTES NFR BLD: 12 % (ref 4–12)
NEUTS SEG # BLD: 6.1 K/UL (ref 1.7–8.2)
NEUTS SEG NFR BLD: 61 % (ref 43–78)
NRBC # BLD: 0 K/UL (ref 0–0.2)
PLATELET # BLD AUTO: 213 K/UL (ref 150–450)
PMV BLD AUTO: 11.6 FL (ref 9.4–12.3)
POTASSIUM SERPL-SCNC: 4.1 MMOL/L (ref 3.5–5.1)
PROT SERPL-MCNC: 6.7 G/DL (ref 6.3–8.2)
RBC # BLD AUTO: 3.99 M/UL (ref 4.23–5.6)
SODIUM SERPL-SCNC: 140 MMOL/L (ref 136–146)
WBC # BLD AUTO: 9.8 K/UL (ref 4.3–11.1)

## 2024-03-06 PROCEDURE — 99214 OFFICE O/P EST MOD 30 MIN: CPT | Performed by: INTERNAL MEDICINE

## 2024-03-06 PROCEDURE — G8427 DOCREV CUR MEDS BY ELIG CLIN: HCPCS | Performed by: INTERNAL MEDICINE

## 2024-03-06 PROCEDURE — G8484 FLU IMMUNIZE NO ADMIN: HCPCS | Performed by: INTERNAL MEDICINE

## 2024-03-06 PROCEDURE — 3078F DIAST BP <80 MM HG: CPT | Performed by: INTERNAL MEDICINE

## 2024-03-06 PROCEDURE — G0439 PPPS, SUBSEQ VISIT: HCPCS | Performed by: INTERNAL MEDICINE

## 2024-03-06 PROCEDURE — G8417 CALC BMI ABV UP PARAM F/U: HCPCS | Performed by: INTERNAL MEDICINE

## 2024-03-06 PROCEDURE — 3074F SYST BP LT 130 MM HG: CPT | Performed by: INTERNAL MEDICINE

## 2024-03-06 PROCEDURE — 1036F TOBACCO NON-USER: CPT | Performed by: INTERNAL MEDICINE

## 2024-03-06 PROCEDURE — 1123F ACP DISCUSS/DSCN MKR DOCD: CPT | Performed by: INTERNAL MEDICINE

## 2024-03-06 RX ORDER — MONTELUKAST SODIUM 10 MG/1
10 TABLET ORAL DAILY
Qty: 90 TABLET | Refills: 1 | Status: SHIPPED | OUTPATIENT
Start: 2024-03-06

## 2024-03-06 RX ORDER — BUPROPION HYDROCHLORIDE 150 MG/1
150 TABLET ORAL EVERY MORNING
Qty: 90 TABLET | Refills: 1 | Status: SHIPPED | OUTPATIENT
Start: 2024-03-06

## 2024-03-06 ASSESSMENT — ENCOUNTER SYMPTOMS
BLOOD IN STOOL: 0
SHORTNESS OF BREATH: 0

## 2024-03-06 ASSESSMENT — LIFESTYLE VARIABLES
HOW OFTEN DO YOU HAVE A DRINK CONTAINING ALCOHOL: NEVER
HOW MANY STANDARD DRINKS CONTAINING ALCOHOL DO YOU HAVE ON A TYPICAL DAY: PATIENT DOES NOT DRINK

## 2024-03-06 NOTE — PATIENT INSTRUCTIONS
Please arrive 20 minutes prior to your scheduled time to see the doctor to allow sufficient time for check-in and rooming.      Please bring all your prescription bottles to each appointment.      I recommend all standard healthcare maintenance and cancer screenings (Colon cancer screening if due, Lung cancer screening if due, Mammogram and Bone Density if female and appropriate, etc) and standard immunizations (Flu, Pneumonia, Covid-19, Shingrix, Tetanus boosters, etc) as appropriate and due to lower your risk for potentially preventable morbidity/mortality from these diseases.     Check BP 1-2 times per week.  Call our office if BP runs above 140/80.     Recommend tobacco cessation if you use tobacco products.         Advance Directives: Care Instructions  Overview  An advance directive is a legal way to state your wishes at the end of your life. It tells your family and your doctor what to do if you can't say what you want.  There are two main types of advance directives. You can change them any time your wishes change.  Living will.  This form tells your family and your doctor your wishes about life support and other treatment. The form is also called a declaration.  Medical power of .  This form lets you name a person to make treatment decisions for you when you can't speak for yourself. This person is called a health care agent (health care proxy, health care surrogate). The form is also called a durable power of  for health care.  If you do not have an advance directive, decisions about your medical care may be made by a family member, or by a doctor or a  who doesn't know you.  It may help to think of an advance directive as a gift to the people who care for you. If you have one, they won't have to make tough decisions by themselves.  For more information, including forms for your state, see the [employer] website (www.caringinfo.org/planning/advance-directives/).  Follow-up care is a

## 2024-03-06 NOTE — PROGRESS NOTES
Hepatic Function Panel; Future    Elevated PSA  -     Basic Metabolic Panel; Future  -     CBC with Auto Differential; Future  -     Hepatic Function Panel; Future    Major depressive disorder with single episode, in full remission (HCC)  -     buPROPion (WELLBUTRIN XL) 150 MG extended release tablet; Take 1 tablet by mouth every morning  -     Basic Metabolic Panel; Future  -     CBC with Auto Differential; Future  -     Hepatic Function Panel; Future         Patient's complete Health Risk Assessment and screening values have been reviewed and are found in Flowsheets. The following problems were reviewed today and where indicated follow up appointments were made and/or referrals ordered.    Positive Risk Factor Screenings with Interventions:                      Advanced Directives:  Do you have a Living Will?: (!) No    Intervention:  has NO advanced directive - information provided                     Objective   Vitals:    03/06/24 0959   BP: 121/64   Pulse: 60   Temp: 97.2 °F (36.2 °C)   TempSrc: Temporal   SpO2: 98%   Weight: 77.6 kg (171 lb)   Height: 1.727 m (5' 8\")      Body mass index is 26 kg/m².               No Known Allergies  Prior to Visit Medications    Medication Sig Taking? Authorizing Provider   buPROPion (WELLBUTRIN XL) 150 MG extended release tablet Take 1 tablet by mouth every morning Yes Sandeep Arnold MD   montelukast (SINGULAIR) 10 MG tablet Take 1 tablet by mouth daily Yes Sandeep Arnold MD   nitroGLYCERIN (NITROSTAT) 0.4 MG SL tablet DISSOLVE 1 TABLET UNDER THE TONGUE EVERY 5 MINUTES AS NEEDED FOR CHEST PAIN Yes Lalito Almaraz MD   amLODIPine-benazepril (LOTREL) 5-20 MG per capsule Take 1 capsule by mouth daily Yes Lalito Almaraz MD   atorvastatin (LIPITOR) 80 MG tablet Take 1 tablet by mouth daily Yes Lalito Almaraz MD   metoprolol succinate (TOPROL XL) 25 MG extended release tablet Take 1 tablet by mouth daily Yes Lalito Almaraz MD   loratadine (CLARITIN) 10 MG capsule Take 1 capsule by mouth

## 2024-03-07 LAB
EST. AVERAGE GLUCOSE BLD GHB EST-MCNC: 120 MG/DL
HBA1C MFR BLD: 5.8 % (ref 4.8–5.6)

## 2024-05-03 DIAGNOSIS — E55.9 VITAMIN D DEFICIENCY: ICD-10-CM

## 2024-05-03 DIAGNOSIS — D64.9 ANEMIA, UNSPECIFIED TYPE: ICD-10-CM

## 2024-05-03 DIAGNOSIS — D47.2 MGUS (MONOCLONAL GAMMOPATHY OF UNKNOWN SIGNIFICANCE): Primary | ICD-10-CM

## 2024-06-03 DIAGNOSIS — E78.49 OTHER HYPERLIPIDEMIA: ICD-10-CM

## 2024-06-03 DIAGNOSIS — D64.9 ANEMIA, UNSPECIFIED TYPE: ICD-10-CM

## 2024-06-03 DIAGNOSIS — D47.2 MGUS (MONOCLONAL GAMMOPATHY OF UNKNOWN SIGNIFICANCE): Primary | ICD-10-CM

## 2024-06-03 DIAGNOSIS — E55.9 VITAMIN D DEFICIENCY: ICD-10-CM

## 2024-06-04 ENCOUNTER — HOSPITAL ENCOUNTER (OUTPATIENT)
Dept: LAB | Age: 79
Discharge: HOME | End: 2024-06-07
Payer: MEDICARE

## 2024-06-04 ENCOUNTER — OFFICE VISIT (OUTPATIENT)
Dept: ONCOLOGY | Age: 79
End: 2024-06-04
Payer: MEDICARE

## 2024-06-04 VITALS
OXYGEN SATURATION: 96 % | HEIGHT: 68 IN | BODY MASS INDEX: 25.26 KG/M2 | TEMPERATURE: 98 F | HEART RATE: 53 BPM | SYSTOLIC BLOOD PRESSURE: 135 MMHG | WEIGHT: 166.7 LBS | RESPIRATION RATE: 16 BRPM | DIASTOLIC BLOOD PRESSURE: 67 MMHG

## 2024-06-04 DIAGNOSIS — D47.2 MGUS (MONOCLONAL GAMMOPATHY OF UNKNOWN SIGNIFICANCE): Primary | ICD-10-CM

## 2024-06-04 DIAGNOSIS — E78.49 OTHER HYPERLIPIDEMIA: ICD-10-CM

## 2024-06-04 DIAGNOSIS — E55.9 VITAMIN D DEFICIENCY: ICD-10-CM

## 2024-06-04 DIAGNOSIS — K21.9 CHRONIC GERD: ICD-10-CM

## 2024-06-04 DIAGNOSIS — D47.2 MGUS (MONOCLONAL GAMMOPATHY OF UNKNOWN SIGNIFICANCE): ICD-10-CM

## 2024-06-04 DIAGNOSIS — D64.9 ANEMIA, UNSPECIFIED TYPE: ICD-10-CM

## 2024-06-04 LAB
25(OH)D3 SERPL-MCNC: 47.5 NG/ML (ref 30–100)
ALBUMIN SERPL-MCNC: 3.9 G/DL (ref 3.2–4.6)
ALBUMIN/GLOB SERPL: 1.3 (ref 1–1.9)
ALP SERPL-CCNC: 85 U/L (ref 40–129)
ALT SERPL-CCNC: 13 U/L (ref 12–65)
ANION GAP SERPL CALC-SCNC: 12 MMOL/L (ref 9–18)
AST SERPL-CCNC: 13 U/L (ref 15–37)
BASOPHILS # BLD: 0.1 K/UL (ref 0–0.2)
BASOPHILS NFR BLD: 1 % (ref 0–2)
BILIRUB SERPL-MCNC: 0.8 MG/DL (ref 0–1.2)
BUN SERPL-MCNC: 15 MG/DL (ref 8–23)
CALCIUM SERPL-MCNC: 9.4 MG/DL (ref 8.8–10.2)
CHLORIDE SERPL-SCNC: 100 MMOL/L (ref 98–107)
CO2 SERPL-SCNC: 25 MMOL/L (ref 20–28)
CREAT SERPL-MCNC: 1.5 MG/DL (ref 0.8–1.3)
DIFFERENTIAL METHOD BLD: ABNORMAL
EOSINOPHIL # BLD: 0.1 K/UL (ref 0–0.8)
EOSINOPHIL NFR BLD: 1 % (ref 0.5–7.8)
ERYTHROCYTE [DISTWIDTH] IN BLOOD BY AUTOMATED COUNT: 12.9 % (ref 11.9–14.6)
GLOBULIN SER CALC-MCNC: 2.9 G/DL (ref 2.3–3.5)
GLUCOSE SERPL-MCNC: 90 MG/DL (ref 70–99)
HCT VFR BLD AUTO: 40.4 % (ref 41.1–50.3)
HGB BLD-MCNC: 13.3 G/DL (ref 13.6–17.2)
IMM GRANULOCYTES # BLD AUTO: 0 K/UL (ref 0–0.5)
IMM GRANULOCYTES NFR BLD AUTO: 0 % (ref 0–5)
KAPPA LC FREE SER-MCNC: 77.8 MG/L (ref 2.4–20.7)
KAPPA LC FREE/LAMBDA FREE SER: 2.8 (ref 0.2–0.8)
LAMBDA LC FREE SERPL-MCNC: 27.3 MG/L (ref 4.2–27.7)
LYMPHOCYTES # BLD: 3 K/UL (ref 0.5–4.6)
LYMPHOCYTES NFR BLD: 30 % (ref 13–44)
MCH RBC QN AUTO: 30.4 PG (ref 26.1–32.9)
MCHC RBC AUTO-ENTMCNC: 32.9 G/DL (ref 31.4–35)
MCV RBC AUTO: 92.4 FL (ref 82–102)
MONOCYTES # BLD: 0.9 K/UL (ref 0.1–1.3)
MONOCYTES NFR BLD: 9 % (ref 4–12)
NEUTS SEG # BLD: 5.7 K/UL (ref 1.7–8.2)
NEUTS SEG NFR BLD: 59 % (ref 43–78)
NRBC # BLD: 0 K/UL (ref 0–0.2)
PLATELET # BLD AUTO: 237 K/UL (ref 150–450)
PMV BLD AUTO: 11 FL (ref 9.4–12.3)
POTASSIUM SERPL-SCNC: 4.5 MMOL/L (ref 3.5–5.1)
PROT SERPL-MCNC: 6.8 G/DL (ref 6.3–8.2)
RBC # BLD AUTO: 4.37 M/UL (ref 4.23–5.6)
SODIUM SERPL-SCNC: 137 MMOL/L (ref 136–145)
WBC # BLD AUTO: 9.8 K/UL (ref 4.3–11.1)

## 2024-06-04 PROCEDURE — 99215 OFFICE O/P EST HI 40 MIN: CPT | Performed by: INTERNAL MEDICINE

## 2024-06-04 PROCEDURE — 1036F TOBACCO NON-USER: CPT | Performed by: INTERNAL MEDICINE

## 2024-06-04 PROCEDURE — 83521 IG LIGHT CHAINS FREE EACH: CPT

## 2024-06-04 PROCEDURE — 86334 IMMUNOFIX E-PHORESIS SERUM: CPT

## 2024-06-04 PROCEDURE — 80053 COMPREHEN METABOLIC PANEL: CPT

## 2024-06-04 PROCEDURE — 82306 VITAMIN D 25 HYDROXY: CPT

## 2024-06-04 PROCEDURE — 85025 COMPLETE CBC W/AUTO DIFF WBC: CPT

## 2024-06-04 PROCEDURE — 3078F DIAST BP <80 MM HG: CPT | Performed by: INTERNAL MEDICINE

## 2024-06-04 PROCEDURE — G8427 DOCREV CUR MEDS BY ELIG CLIN: HCPCS | Performed by: INTERNAL MEDICINE

## 2024-06-04 PROCEDURE — 36415 COLL VENOUS BLD VENIPUNCTURE: CPT

## 2024-06-04 PROCEDURE — 84165 PROTEIN E-PHORESIS SERUM: CPT

## 2024-06-04 PROCEDURE — 3075F SYST BP GE 130 - 139MM HG: CPT | Performed by: INTERNAL MEDICINE

## 2024-06-04 PROCEDURE — 82784 ASSAY IGA/IGD/IGG/IGM EACH: CPT

## 2024-06-04 PROCEDURE — 1123F ACP DISCUSS/DSCN MKR DOCD: CPT | Performed by: INTERNAL MEDICINE

## 2024-06-04 PROCEDURE — G8417 CALC BMI ABV UP PARAM F/U: HCPCS | Performed by: INTERNAL MEDICINE

## 2024-06-04 NOTE — PROGRESS NOTES
[place] [id number]  Phone: [phone]           6/4/2024  Brendon Tena  1945  [id number]         Brendon Tena is a 78 year old  man who has been sent to my clinic by Dr. Sandeep Roberts for evaluation of Anemia; he has renal insufficiency. 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        ALLERGIES:     No known drug allergies.        FAMILY HISTORY:     No hematologic disorders.        SOCIAL HISTORY:    He is single and lives alone. He is a retired . He stopped smoking cigarettes in 9/1997.        PAST MEDICAL HISTORY:    Hyperlipidemia, Depression, Coronary Artery Disease, s/p PTCA, Hypertension, GERD, renal insufficiency, Vitamin D deficiency and MGUS.        ROS:  The patient complained of fatigue; all other systems negative.        PHYSICAL EXAM:   The patient was alert, awake and oriented, no acute distress was noted. Oral examination did not reveal any mucosal lesions. Lymph node examination did not reveal any adenopathy. Neck examination revealed a supple neck, no thyromegaly or masses were noted. Chest examination revealed normal vesicular breath sounds. Heart examination revealed S-1 and S-2 with a 2/6 systolic murmur. Abdominal examination revealed a non-tender abdomen, bowel sounds were positive, no organomegaly could be appreciated. Examination of the extremities did not reveal any tenderness or erythema. Examination of the skin did not reveal any lesions.  Medical problems and test results were reviewed with the patient today.        KPS:     90.        LABORATORY INVESTIGATIONS:  CBC showed a WBC count of 9.8, ANC was 5.7, Hemoglobin was 13.3 and Platelets were 237.        ASSESSMENT:    Anemia; MGUS, IgM Kappa;

## 2024-06-04 NOTE — PATIENT INSTRUCTIONS
needs at least 24 hours before your scheduled appointment time.If you have any questions regarding your upcoming schedule please reach out to your care team through [employer] or call [phone].     Please notify your assigned Nurse Navigator of any unplanned hospital admissions or Emergency Room visits within 24 hours of discharge.    -------------------------------------------------------------------------------------------------------------------  Please call our office at [phone] if you have any  of the following symptoms:   Fever of 100.5 or greater  Chills  Shortness of breath  Swelling or pain in one leg    After office hours an answering service is available and will contact a provider for emergencies or if you are experiencing any of the above symptoms.        Adriana Lira MA

## 2024-06-10 LAB
ALBUMIN SERPL ELPH-MCNC: 3.7 G/DL (ref 2.9–4.4)
ALBUMIN/GLOB SERPL: 1.3 (ref 0.7–1.7)
ALPHA1 GLOB SERPL ELPH-MCNC: 0.2 G/DL (ref 0–0.4)
ALPHA2 GLOB SERPL ELPH-MCNC: 0.7 G/DL (ref 0.4–1)
B-GLOBULIN SERPL ELPH-MCNC: 0.9 G/DL (ref 0.7–1.3)
GAMMA GLOB SERPL ELPH-MCNC: 1.1 G/DL (ref 0.4–1.8)
GLOBULIN SER-MCNC: 2.9 G/DL (ref 2.2–3.9)
IGA SERPL-MCNC: 289 MG/DL (ref 61–437)
IGG SERPL-MCNC: 808 MG/DL (ref 603–1613)
IGM SERPL-MCNC: 336 MG/DL (ref 15–143)
INTERPRETATION SERPL IEP-IMP: ABNORMAL
M PROTEIN SERPL ELPH-MCNC: 0.2 G/DL
PROT SERPL-MCNC: 6.6 G/DL (ref 6–8.5)

## 2024-07-10 ENCOUNTER — OFFICE VISIT (OUTPATIENT)
Dept: RHEUMATOLOGY | Age: 79
End: 2024-07-10
Payer: MEDICARE

## 2024-07-10 VITALS
OXYGEN SATURATION: 97 % | WEIGHT: 167 LBS | HEIGHT: 68 IN | BODY MASS INDEX: 25.31 KG/M2 | RESPIRATION RATE: 18 BRPM | DIASTOLIC BLOOD PRESSURE: 70 MMHG | SYSTOLIC BLOOD PRESSURE: 132 MMHG | HEART RATE: 60 BPM

## 2024-07-10 DIAGNOSIS — Z15.89 HLA B27 (HLA B27 POSITIVE): Primary | ICD-10-CM

## 2024-07-10 DIAGNOSIS — M47.816 LUMBAR SPONDYLOSIS: ICD-10-CM

## 2024-07-10 PROCEDURE — G2211 COMPLEX E/M VISIT ADD ON: HCPCS | Performed by: INTERNAL MEDICINE

## 2024-07-10 PROCEDURE — G8417 CALC BMI ABV UP PARAM F/U: HCPCS | Performed by: INTERNAL MEDICINE

## 2024-07-10 PROCEDURE — 3078F DIAST BP <80 MM HG: CPT | Performed by: INTERNAL MEDICINE

## 2024-07-10 PROCEDURE — G8427 DOCREV CUR MEDS BY ELIG CLIN: HCPCS | Performed by: INTERNAL MEDICINE

## 2024-07-10 PROCEDURE — 1036F TOBACCO NON-USER: CPT | Performed by: INTERNAL MEDICINE

## 2024-07-10 PROCEDURE — 1123F ACP DISCUSS/DSCN MKR DOCD: CPT | Performed by: INTERNAL MEDICINE

## 2024-07-10 PROCEDURE — 3075F SYST BP GE 130 - 139MM HG: CPT | Performed by: INTERNAL MEDICINE

## 2024-07-10 PROCEDURE — 99214 OFFICE O/P EST MOD 30 MIN: CPT | Performed by: INTERNAL MEDICINE

## 2024-07-10 ASSESSMENT — ROUTINE ASSESSMENT OF PATIENT INDEX DATA (RAPID3)
ON A SCALE OF ONE TO TEN, HOW DIFFICULT WAS IT FOR YOU TO COMPLETE THE LISTED DAILY PHYSICAL TASKS OVER THE LAST WEEK: 0.2
ON A SCALE OF ONE TO TEN, CONSIDERING ALL THE WAYS IN WHICH ILLNESS AND HEALTH CONDITIONS MAY AFFECT YOU AT THIS TIME, PLEASE INDICATE BELOW HOW YOU ARE DOING:: 1
WHEN YOU AWAKENED IN THE MORNING OVER THE LAST WEEK, PLEASE INDICATE THE AMOUNT OF TIME IT TAKES UNTIL YOU ARE AS LIMBER AS YOU WILL BE FOR THE DAY: < 10 MIN
ON A SCALE OF ONE TO TEN, HOW MUCH PAIN HAVE YOU HAD BECAUSE OF YOUR CONDITION OVER THE PAST WEEK?: 1
ON A SCALE OF ONE TO TEN, HOW MUCH OF A PROBLEM HAS UNUSUAL FATIGUE OR TIREDNESS BEEN FOR YOU OVER THE PAST WEEK?: 1

## 2024-07-10 NOTE — PROGRESS NOTES
7/10/24      SUBJECTIVE:  Brendon Tena is a 78 y.o.  male that is here for follow-up of:     Hx of inflammatory makers - elevated  HLA B27 positivity  negative include RF ANCA, histone dsDNA SSA SSB ACE    PMH:  - CAD s/p PCI, HTN, HL  - MGUS  - Depression  - GERD, esoph dilation Nov 2020     Occupation: retired - commercial real estate  ---  Last OV July 2023. Reviewed labs 6/4/24 : CBC CMP     Episodic joint pain lasting 1 minute at a time that resolves -infrequent every few months.     Am stiffness - none    Back pain - if standing or walking for extended periods of time - unchanged - located lower thoracic region. Has trouble with bending down to reach items on the floor due to back pain or standing for extended time periods.  Symptoms of back pain have not changed.  Not present in mornings. Sometimes, when he is showering he will experience more significant back pain with associated shortness of breath, he will rest for 5-10 min. Never takes medication for symptoms. Previously completed PT for R shoulder pain but not for back pain.     No joint swelling, dactylitis, enthesitis, uveitis type symptoms  Denies n/v/stomach pain/diarrhea    episodic notices minimal L foot /ankle swelling at end of the day.         7/10/2024    10:00 AM   DMARD/Biologic   AM Stiffness < 10 min   Pain 1   Fatigue 1   MDHAQ 0.2   Patient Global Score 1         REVIEW OF SYSTEMS:  A total of 10 systems (musculoskeletal system as stated in the HPI and the following 9 systems) were reviewed with patient today and were negative except as stated in the HPI and except for the following (depicted with an \"X\"):        \"X\" Constitutional  \"X\" HEENT /Mouth  \"X\" Cardiovascular and  Respiratory (2 systems)  \"X\" Gastrointestinal    Fever/chills   Hair loss  x Shortness of breath   Upset stomach    Falls   Dry mouth   Coughing   Diarrhea / constipation    Wt loss   Mouth sores   Wheezing   Heartburn    Wt gain  x Ringing ears   Chest pain

## 2024-08-02 ENCOUNTER — EVALUATION (OUTPATIENT)
Age: 79
End: 2024-08-02
Payer: MEDICARE

## 2024-08-02 DIAGNOSIS — Z78.9 IMPAIRED MOTOR CONTROL: ICD-10-CM

## 2024-08-02 DIAGNOSIS — M25.60 JOINT STIFFNESS OF SPINE: ICD-10-CM

## 2024-08-02 DIAGNOSIS — M54.6 THORACOLUMBAR BACK PAIN: Primary | ICD-10-CM

## 2024-08-02 DIAGNOSIS — Z74.09 DECREASED FUNCTIONAL MOBILITY AND ENDURANCE: ICD-10-CM

## 2024-08-02 DIAGNOSIS — M54.50 THORACOLUMBAR BACK PAIN: Primary | ICD-10-CM

## 2024-08-02 PROCEDURE — 97110 THERAPEUTIC EXERCISES: CPT

## 2024-08-02 PROCEDURE — 97162 PT EVAL MOD COMPLEX 30 MIN: CPT

## 2024-08-02 NOTE — PROGRESS NOTES
[facility]  [address] [facility] [address]  Dept: [phone]     Physical Therapy Initial Assessment     Referring MD: Evelia Forman MD  Diagnosis:     ICD-10-CM    1. Thoracolumbar back pain  M54.50     M54.6       2. Joint stiffness of spine  M25.60       3. Impaired motor control  Z78.9       4. Decreased functional mobility and endurance  Z74.09          Surgery: n/a    Therapy precautions:Blood thinners  Co-morbidities affecting plan of care: GERD, CKC (Stage III) CAD (2 stents), HTN    Total Timed Codes: 15 min, Total Treatment Time: 45 min  Modifier needed: No    PERTINENT MEDICAL HISTORY     Past medical and surgical history:   Past Medical History:   Diagnosis Date    CAD (coronary artery disease)     Dr Almaraz  stents x 2    CKD (chronic kidney disease)     Esophageal ring     s/p dilation Nov 2020    GERD (gastroesophageal reflux disease)     HLD (hyperlipidemia)     HTN (hypertension)     Ill-defined condition     VASOVAGAL SYNCOPE episode x 3    MGUS (monoclonal gammopathy of unknown significance)     Psychiatric disorder     depression      Past Surgical History:   Procedure Laterality Date    COLONOSCOPY      OTHER SURGICAL HISTORY      EGD/colonoscopy & esophagus dilation    OTHER SURGICAL HISTORY      dental, root canal, infection     [place] UNLISTED PROCEDURE CARDIAC SURGERY      stents x 2     Medications: reviewed in chart   Allergies: No Known Allergies     Diagnostic exams (per chart review): n/a    SUBJECTIVE     Chief complaints/history of injury: Patient reports onset of thoracolumbar pain long duration walking (30+ minutes), going across the lower thoracic and upper lumbar spine. He notes pain will increase with continued activity almost to point of being debilitating. This has also been occurring with long duration standing, forcing him to sit when attending his grandchildren's sport events.  He also has been experiencing abdominal pain in the morning during

## 2024-08-06 ENCOUNTER — TREATMENT (OUTPATIENT)
Age: 79
End: 2024-08-06
Payer: MEDICARE

## 2024-08-06 DIAGNOSIS — Z74.09 DECREASED FUNCTIONAL MOBILITY AND ENDURANCE: ICD-10-CM

## 2024-08-06 DIAGNOSIS — M54.6 THORACOLUMBAR BACK PAIN: Primary | ICD-10-CM

## 2024-08-06 DIAGNOSIS — M54.50 THORACOLUMBAR BACK PAIN: Primary | ICD-10-CM

## 2024-08-06 DIAGNOSIS — Z78.9 IMPAIRED MOTOR CONTROL: ICD-10-CM

## 2024-08-06 DIAGNOSIS — M25.60 JOINT STIFFNESS OF SPINE: ICD-10-CM

## 2024-08-06 PROCEDURE — 97110 THERAPEUTIC EXERCISES: CPT | Performed by: PHYSICAL THERAPIST

## 2024-08-06 NOTE — PROGRESS NOTES
[facility]  [address] [facility] [address]  Dept: [phone]     Physical Therapy Daily Note     Referring MD: Evelia Forman MD  Diagnosis:     ICD-10-CM    1. Thoracolumbar back pain  M54.50     M54.6       2. Joint stiffness of spine  M25.60       3. Impaired motor control  Z78.9       4. Decreased functional mobility and endurance  Z74.09          Surgery: n/a    Therapy precautions:Blood thinners  Co-morbidities affecting plan of care: GERD, CKC (Stage III) CAD (2 stents), HTN    Total Timed Codes: 45 min, Total Treatment Time: 45 min  Modifier needed: No    PERTINENT MEDICAL HISTORY     Past medical and surgical history:   Past Medical History:   Diagnosis Date    CAD (coronary artery disease)     Dr Almaraz  stents x 2    CKD (chronic kidney disease)     Esophageal ring     s/p dilation Nov 2020    GERD (gastroesophageal reflux disease)     HLD (hyperlipidemia)     HTN (hypertension)     Ill-defined condition     VASOVAGAL SYNCOPE episode x 3    MGUS (monoclonal gammopathy of unknown significance)     Psychiatric disorder     depression      Past Surgical History:   Procedure Laterality Date    COLONOSCOPY      OTHER SURGICAL HISTORY      EGD/colonoscopy & esophagus dilation    OTHER SURGICAL HISTORY      dental, root canal, infection     [place] UNLISTED PROCEDURE CARDIAC SURGERY      stents x 2     Medications: reviewed in chart   Allergies: No Known Allergies     Diagnostic exams (per chart review): n/a    Chief complaints/history of injury: Patient reports onset of thoracolumbar pain long duration walking (30+ minutes), going across the lower thoracic and upper lumbar spine. He notes pain will increase with continued activity almost to point of being debilitating. This has also been occurring with long duration standing, forcing him to sit when attending his grandchildren's sport events.  He also has been experiencing abdominal pain in the morning during his shower, which

## 2024-08-13 ENCOUNTER — TREATMENT (OUTPATIENT)
Age: 79
End: 2024-08-13
Payer: MEDICARE

## 2024-08-13 DIAGNOSIS — M25.60 JOINT STIFFNESS OF SPINE: ICD-10-CM

## 2024-08-13 DIAGNOSIS — M54.6 THORACOLUMBAR BACK PAIN: Primary | ICD-10-CM

## 2024-08-13 DIAGNOSIS — M54.50 THORACOLUMBAR BACK PAIN: Primary | ICD-10-CM

## 2024-08-13 DIAGNOSIS — Z78.9 IMPAIRED MOTOR CONTROL: ICD-10-CM

## 2024-08-13 DIAGNOSIS — Z74.09 DECREASED FUNCTIONAL MOBILITY AND ENDURANCE: ICD-10-CM

## 2024-08-13 PROCEDURE — 97530 THERAPEUTIC ACTIVITIES: CPT

## 2024-08-13 PROCEDURE — 97110 THERAPEUTIC EXERCISES: CPT

## 2024-08-13 NOTE — PROGRESS NOTES
[facility]  [address] [facility] [address]  Dept: [phone]     Physical Therapy Daily Note     Referring MD: Evelia Forman MD  Diagnosis:     ICD-10-CM    1. Thoracolumbar back pain  M54.50     M54.6       2. Joint stiffness of spine  M25.60       3. Impaired motor control  Z78.9       4. Decreased functional mobility and endurance  Z74.09          Surgery: n/a    Therapy precautions:Blood thinners  Co-morbidities affecting plan of care: GERD, CKC (Stage III) CAD (2 stents), HTN    Chief complaints/history of injury: Patient reports onset of thoracolumbar pain long duration walking (30+ minutes), going across the lower thoracic and upper lumbar spine. He notes pain will increase with continued activity almost to point of being debilitating. This has also been occurring with long duration standing, forcing him to sit when attending his grandchildren's sport events.  He also has been experiencing abdominal pain in the morning during his shower, which feels as if it wraps around to the lumbar spine, with intensity causing a sense of about to pass out and hyperventilating. He has previously undergone abdominal ultrasound, and his PCP jasso not have concern about abdominal origin.  He notes an extensive history of CAD and CKC, with suspicion that there could be some referred pain from the kidneys.    Date symptoms began: 3-4 years  Nature of condition: Chronic (continuous duration > 3 months)  Primary cause of current episode: Unspecified  How did symptoms start: see above  Describe current symptoms: onset of lumbar pain with extended duration standing activities    Received previous therapy? No    Pain Assessment:  Pain location: Upper lumbar spine (B)    Current (0-10 pain scale): 0/10  Average Pain/symptom intensity (0-10 scale)  Last 24 hours: 5/10  Last week (1-7 days): 6/10  How often do you feel symptoms? Occasionally (26-50%)  Description: stabbing  Aggravating factors:

## 2024-08-15 ENCOUNTER — TREATMENT (OUTPATIENT)
Age: 79
End: 2024-08-15
Payer: MEDICARE

## 2024-08-15 DIAGNOSIS — Z74.09 DECREASED FUNCTIONAL MOBILITY AND ENDURANCE: ICD-10-CM

## 2024-08-15 DIAGNOSIS — Z78.9 IMPAIRED MOTOR CONTROL: ICD-10-CM

## 2024-08-15 DIAGNOSIS — M25.60 JOINT STIFFNESS OF SPINE: ICD-10-CM

## 2024-08-15 DIAGNOSIS — M54.50 THORACOLUMBAR BACK PAIN: Primary | ICD-10-CM

## 2024-08-15 DIAGNOSIS — M54.6 THORACOLUMBAR BACK PAIN: Primary | ICD-10-CM

## 2024-08-15 PROCEDURE — 97110 THERAPEUTIC EXERCISES: CPT

## 2024-08-15 NOTE — PROGRESS NOTES
[facility]  [address] [facility] [address]  Dept: [phone]     Physical Therapy Daily Note     Referring MD: Evelia Fomran MD  Diagnosis:     ICD-10-CM    1. Thoracolumbar back pain  M54.50     M54.6       2. Joint stiffness of spine  M25.60       3. Impaired motor control  Z78.9       4. Decreased functional mobility and endurance  Z74.09          Surgery: n/a    Therapy precautions:Blood thinners  Co-morbidities affecting plan of care: GERD, CKC (Stage III) CAD (2 stents), HTN    Chief complaints/history of injury: Patient reports onset of thoracolumbar pain long duration walking (30+ minutes), going across the lower thoracic and upper lumbar spine. He notes pain will increase with continued activity almost to point of being debilitating. This has also been occurring with long duration standing, forcing him to sit when attending his grandchildren's sport events.  He also has been experiencing abdominal pain in the morning during his shower, which feels as if it wraps around to the lumbar spine, with intensity causing a sense of about to pass out and hyperventilating. He has previously undergone abdominal ultrasound, and his PCP jasso not have concern about abdominal origin.  He notes an extensive history of CAD and CKC, with suspicion that there could be some referred pain from the kidneys.    Date symptoms began: 3-4 years  Nature of condition: Chronic (continuous duration > 3 months)  Primary cause of current episode: Unspecified  How did symptoms start: see above  Describe current symptoms: onset of lumbar pain with extended duration standing activities    Received previous therapy? No    Pain Assessment:  Pain location: Upper lumbar spine (B)    Current (0-10 pain scale): 0/10  Average Pain/symptom intensity (0-10 scale)  Last 24 hours: 5/10  Last week (1-7 days): 6/10  How often do you feel symptoms? Occasionally (26-50%)  Description: stabbing  Aggravating factors:

## 2024-08-26 ENCOUNTER — TREATMENT (OUTPATIENT)
Age: 79
End: 2024-08-26
Payer: MEDICARE

## 2024-08-26 DIAGNOSIS — M54.50 THORACOLUMBAR BACK PAIN: Primary | ICD-10-CM

## 2024-08-26 DIAGNOSIS — M54.6 THORACOLUMBAR BACK PAIN: Primary | ICD-10-CM

## 2024-08-26 DIAGNOSIS — M25.60 JOINT STIFFNESS OF SPINE: ICD-10-CM

## 2024-08-26 DIAGNOSIS — Z74.09 DECREASED FUNCTIONAL MOBILITY AND ENDURANCE: ICD-10-CM

## 2024-08-26 PROCEDURE — 97110 THERAPEUTIC EXERCISES: CPT | Performed by: PHYSICAL THERAPIST

## 2024-08-26 NOTE — PROGRESS NOTES
[facility]THOPAEDICS  [address] [facility] [address]  Dept: [phone]     Physical Therapy Daily Note     Referring MD: Evelia Forman MD  Diagnosis:     ICD-10-CM    1. Thoracolumbar back pain  M54.50     M54.6       2. Joint stiffness of spine  M25.60       3. Decreased functional mobility and endurance  Z74.09          Surgery: n/a    Therapy precautions:Blood thinners  Co-morbidities affecting plan of care: GERD, CKC (Stage III) CAD (2 stents), HTN    Chief complaints/history of injury: Patient reports onset of thoracolumbar pain long duration walking (30+ minutes), going across the lower thoracic and upper lumbar spine. He notes pain will increase with continued activity almost to point of being debilitating. This has also been occurring with long duration standing, forcing him to sit when attending his grandchildren's sport events.  He also has been experiencing abdominal pain in the morning during his shower, which feels as if it wraps around to the lumbar spine, with intensity causing a sense of about to pass out and hyperventilating. He has previously undergone abdominal ultrasound, and his PCP jasso not have concern about abdominal origin.  He notes an extensive history of CAD and CKC, with suspicion that there could be some referred pain from the kidneys.    Date symptoms began: 3-4 years  Nature of condition: Chronic (continuous duration > 3 months)  Primary cause of current episode: Unspecified  How did symptoms start: see above  Describe current symptoms: onset of lumbar pain with extended duration standing activities    Received previous therapy? No    Pain Assessment:  Pain location: Upper lumbar spine (B)    Current (0-10 pain scale): 0/10  Average Pain/symptom intensity (0-10 scale)  Last 24 hours: 5/10  Last week (1-7 days): 6/10  How often do you feel symptoms? Occasionally (26-50%)  Description: stabbing  Aggravating factors: prolonged standing and walking  Alleviating  lumbopelvic control and strength   appropriate for Rehabilitation Management per the LBP Treatment Based Classification, with focus on Movement Control.     PLAN     Continue therapy per POC. F/u on symptoms following progression of rotation. Primary focus on activities for thoracolumbar mobility and control, with progressive loading and functional tasks within tolerance.  Reassess STGs.    PLAN OF CARE     Effective Dates/Duration: 8/2/2024 TO 9/13/2024  (42 days).    Frequency: 2x/week   Interventions may include but are not limited to:   (66465) Therapeutic exercise to develop ROM, strength, endurance and flexibility  (73093) Therapeutic activities using dynamic activities to improve function  (17942) Manual therapy techniques to improve joint and/or soft tissue mobility, ROM, and function as well as helping to decrease pain/spasms and swelling  Home exercise program (HEP) development    GOALS     Short term goals to be met by 8/23/2024  (3 weeks):  Patient will demonstrate good recall of HEP requiring no more than minimal verbal cuing for proper form and technique.  Patient to subjectively report a decrease in pain to 2/10 at worst to allow patient to increase function with ADLs.   Pt will demonstrate unrestricted AROM of thoracic spine to improve ability to perform ADLs and self care.  Pt will tolerate sitting/standing x 60 minutes to allow for increased duration of exercise activities.    Long term goals to be met by 9/13/2024  (42 days)  Pt will increase LQ strength with MMT to at least 4+/5 for improved performance of exercise and functional activities.  Pt will tolerate sitting/standing x 120 minutes to allow for attendance at grandchildren's sporting events.  Pt will return to previous level of function without complaint of pain.  Pt will improve score on the Oswestry Low Back Pain Questionnaire to </= 5 % disability, demonstrating improved overall function.    DeviceAuthority  Access Code: [email or web address]  URL:

## 2024-08-27 NOTE — PROGRESS NOTES
[facility]THOPAED[facility]  [address] [facility] [address]  Dept: [phone]     Physical Therapy Progress Report     Referring MD: Evelia Forman MD  Diagnosis:     ICD-10-CM    1. Thoracolumbar back pain  M54.50     M54.6       2. Joint stiffness of spine  M25.60       3. Decreased functional mobility and endurance  Z74.09       4. Impaired motor control  Z78.9          Surgery: n/a    Therapy precautions:Blood thinners  Co-morbidities affecting plan of care: GERD, CKC (Stage III) CAD (2 stents), HTN    Chief complaints/history of injury: Patient reports onset of thoracolumbar pain long duration walking (30+ minutes), going across the lower thoracic and upper lumbar spine. He notes pain will increase with continued activity almost to point of being debilitating. This has also been occurring with long duration standing, forcing him to sit when attending his grandchildren's sport events.  He also has been experiencing abdominal pain in the morning during his shower, which feels as if it wraps around to the lumbar spine, with intensity causing a sense of about to pass out and hyperventilating. He has previously undergone abdominal ultrasound, and his PCP jasso not have concern about abdominal origin.  He notes an extensive history of CAD and CKC, with suspicion that there could be some referred pain from the kidneys.    Date symptoms began: 3-4 years  Nature of condition: Chronic (continuous duration > 3 months)  Primary cause of current episode: Unspecified  How did symptoms start: see above  Describe current symptoms: onset of lumbar pain with extended duration standing activities    Received previous therapy? No    Pain Assessment:  Pain location: Upper lumbar spine (B)    Current (0-10 pain scale): 0/10  Average Pain/symptom intensity (0-10 scale)  Last 24 hours: 5/10  Last week (1-7 days): 6/10  How often do you feel symptoms? Occasionally (26-50%)  Description: stabbing  Aggravating factors:

## 2024-08-28 ENCOUNTER — TREATMENT (OUTPATIENT)
Age: 79
End: 2024-08-28
Payer: MEDICARE

## 2024-08-28 DIAGNOSIS — M25.60 JOINT STIFFNESS OF SPINE: ICD-10-CM

## 2024-08-28 DIAGNOSIS — M54.6 THORACOLUMBAR BACK PAIN: Primary | ICD-10-CM

## 2024-08-28 DIAGNOSIS — Z78.9 IMPAIRED MOTOR CONTROL: ICD-10-CM

## 2024-08-28 DIAGNOSIS — Z74.09 DECREASED FUNCTIONAL MOBILITY AND ENDURANCE: ICD-10-CM

## 2024-08-28 DIAGNOSIS — M54.50 THORACOLUMBAR BACK PAIN: Primary | ICD-10-CM

## 2024-08-28 PROCEDURE — 97110 THERAPEUTIC EXERCISES: CPT | Performed by: PHYSICAL THERAPIST

## 2024-09-03 ENCOUNTER — TREATMENT (OUTPATIENT)
Age: 79
End: 2024-09-03
Payer: MEDICARE

## 2024-09-03 DIAGNOSIS — M54.50 THORACOLUMBAR BACK PAIN: Primary | ICD-10-CM

## 2024-09-03 DIAGNOSIS — M25.60 JOINT STIFFNESS OF SPINE: ICD-10-CM

## 2024-09-03 DIAGNOSIS — Z78.9 IMPAIRED MOTOR CONTROL: ICD-10-CM

## 2024-09-03 DIAGNOSIS — Z74.09 DECREASED FUNCTIONAL MOBILITY AND ENDURANCE: ICD-10-CM

## 2024-09-03 DIAGNOSIS — M54.6 THORACOLUMBAR BACK PAIN: Primary | ICD-10-CM

## 2024-09-03 PROCEDURE — 97110 THERAPEUTIC EXERCISES: CPT

## 2024-09-03 PROCEDURE — 97140 MANUAL THERAPY 1/> REGIONS: CPT

## 2024-09-03 NOTE — PROGRESS NOTES
[facility] PT [facility] ORTHOPAEDICS  [address] [facility] [address]  Dept: [phone]     Physical Therapy Progress Report     Referring MD: Evelia Forman MD  Diagnosis:     ICD-10-CM    1. Thoracolumbar back pain  M54.50     M54.6       2. Joint stiffness of spine  M25.60       3. Decreased functional mobility and endurance  Z74.09       4. Impaired motor control  Z78.9          Surgery: n/a    Therapy precautions:Blood thinners  Co-morbidities affecting plan of care: GERD, CKC (Stage III) CAD (2 stents), HTN    Chief complaints/history of injury: Patient reports onset of thoracolumbar pain long duration walking (30+ minutes), going across the lower thoracic and upper lumbar spine. He notes pain will increase with continued activity almost to point of being debilitating. This has also been occurring with long duration standing, forcing him to sit when attending his grandchildren's sport events.  He also has been experiencing abdominal pain in the morning during his shower, which feels as if it wraps around to the lumbar spine, with intensity causing a sense of about to pass out and hyperventilating. He has previously undergone abdominal ultrasound, and his PCP jasso not have concern about abdominal origin.  He notes an extensive history of CAD and CKC, with suspicion that there could be some referred pain from the kidneys.    Date symptoms began: 3-4 years  Nature of condition: Chronic (continuous duration > 3 months)  Primary cause of current episode: Unspecified  How did symptoms start: see above  Describe current symptoms: onset of lumbar pain with extended duration standing activities    Received previous therapy? No    Pain Assessment:  Pain location: Upper lumbar spine (B)    Current (0-10 pain scale): 0/10  Average Pain/symptom intensity (0-10 scale)  Last 24 hours: 5/10  Last week (1-7 days): 6/10  How often do you feel symptoms? Occasionally (26-50%)  Description: stabbing  Aggravating factors:

## 2024-09-10 ENCOUNTER — TREATMENT (OUTPATIENT)
Age: 79
End: 2024-09-10
Payer: MEDICARE

## 2024-09-10 ENCOUNTER — TELEPHONE (OUTPATIENT)
Dept: INTERNAL MEDICINE CLINIC | Facility: CLINIC | Age: 79
End: 2024-09-10

## 2024-09-10 DIAGNOSIS — M25.60 JOINT STIFFNESS OF SPINE: ICD-10-CM

## 2024-09-10 DIAGNOSIS — Z78.9 IMPAIRED MOTOR CONTROL: ICD-10-CM

## 2024-09-10 DIAGNOSIS — Z74.09 DECREASED FUNCTIONAL MOBILITY AND ENDURANCE: ICD-10-CM

## 2024-09-10 DIAGNOSIS — M54.50 THORACOLUMBAR BACK PAIN: Primary | ICD-10-CM

## 2024-09-10 DIAGNOSIS — M54.6 THORACOLUMBAR BACK PAIN: Primary | ICD-10-CM

## 2024-09-10 PROCEDURE — 97110 THERAPEUTIC EXERCISES: CPT

## 2024-09-10 PROCEDURE — 97530 THERAPEUTIC ACTIVITIES: CPT

## 2024-09-11 ENCOUNTER — OFFICE VISIT (OUTPATIENT)
Dept: INTERNAL MEDICINE CLINIC | Facility: CLINIC | Age: 79
End: 2024-09-11

## 2024-09-11 VITALS
HEART RATE: 56 BPM | TEMPERATURE: 97.2 F | DIASTOLIC BLOOD PRESSURE: 64 MMHG | WEIGHT: 166.8 LBS | BODY MASS INDEX: 25.28 KG/M2 | OXYGEN SATURATION: 97 % | SYSTOLIC BLOOD PRESSURE: 129 MMHG | HEIGHT: 68 IN

## 2024-09-11 DIAGNOSIS — E78.2 MIXED HYPERLIPIDEMIA: ICD-10-CM

## 2024-09-11 DIAGNOSIS — F32.5 MAJOR DEPRESSIVE DISORDER WITH SINGLE EPISODE, IN FULL REMISSION (HCC): ICD-10-CM

## 2024-09-11 DIAGNOSIS — R73.03 PREDIABETES: ICD-10-CM

## 2024-09-11 DIAGNOSIS — J30.9 ALLERGIC RHINITIS, UNSPECIFIED SEASONALITY, UNSPECIFIED TRIGGER: ICD-10-CM

## 2024-09-11 DIAGNOSIS — I77.819 AORTIC ECTASIA (HCC): ICD-10-CM

## 2024-09-11 DIAGNOSIS — I25.10 ASCVD (ARTERIOSCLEROTIC CARDIOVASCULAR DISEASE): ICD-10-CM

## 2024-09-11 DIAGNOSIS — R97.20 ELEVATED PSA: ICD-10-CM

## 2024-09-11 DIAGNOSIS — I25.10 ASCVD (ARTERIOSCLEROTIC CARDIOVASCULAR DISEASE): Primary | ICD-10-CM

## 2024-09-11 DIAGNOSIS — Z15.89 HLA B27 (HLA B27 POSITIVE): ICD-10-CM

## 2024-09-11 DIAGNOSIS — I10 ESSENTIAL HYPERTENSION: ICD-10-CM

## 2024-09-11 DIAGNOSIS — N18.31 STAGE 3A CHRONIC KIDNEY DISEASE (HCC): ICD-10-CM

## 2024-09-11 LAB
ALBUMIN SERPL-MCNC: 3.7 G/DL (ref 3.2–4.6)
ALBUMIN/GLOB SERPL: 1.3 (ref 1–1.9)
ALP SERPL-CCNC: 74 U/L (ref 40–129)
ALT SERPL-CCNC: 12 U/L (ref 12–65)
ANION GAP SERPL CALC-SCNC: 10 MMOL/L (ref 9–18)
APPEARANCE UR: CLEAR
AST SERPL-CCNC: 14 U/L (ref 15–37)
BASOPHILS # BLD: 0 K/UL (ref 0–0.2)
BASOPHILS NFR BLD: 1 % (ref 0–2)
BILIRUB DIRECT SERPL-MCNC: <0.2 MG/DL (ref 0–0.4)
BILIRUB SERPL-MCNC: 0.5 MG/DL (ref 0–1.2)
BILIRUB UR QL: NEGATIVE
BUN SERPL-MCNC: 16 MG/DL (ref 8–23)
CALCIUM SERPL-MCNC: 9.4 MG/DL (ref 8.8–10.2)
CHLORIDE SERPL-SCNC: 104 MMOL/L (ref 98–107)
CHOLEST SERPL-MCNC: 104 MG/DL (ref 0–200)
CO2 SERPL-SCNC: 27 MMOL/L (ref 20–28)
COLOR UR: NORMAL
CREAT SERPL-MCNC: 1.35 MG/DL (ref 0.8–1.3)
DIFFERENTIAL METHOD BLD: ABNORMAL
EOSINOPHIL # BLD: 0.1 K/UL (ref 0–0.8)
EOSINOPHIL NFR BLD: 1 % (ref 0.5–7.8)
ERYTHROCYTE [DISTWIDTH] IN BLOOD BY AUTOMATED COUNT: 13.2 % (ref 11.9–14.6)
ERYTHROCYTE [SEDIMENTATION RATE] IN BLOOD: 3 MM/HR
EST. AVERAGE GLUCOSE BLD GHB EST-MCNC: 121 MG/DL
GLOBULIN SER CALC-MCNC: 2.9 G/DL (ref 2.3–3.5)
GLUCOSE SERPL-MCNC: 86 MG/DL (ref 70–99)
GLUCOSE UR STRIP.AUTO-MCNC: NEGATIVE MG/DL
HBA1C MFR BLD: 5.8 % (ref 0–5.6)
HCT VFR BLD AUTO: 40 % (ref 41.1–50.3)
HDLC SERPL-MCNC: 29 MG/DL (ref 40–60)
HDLC SERPL: 3.6 (ref 0–5)
HGB BLD-MCNC: 12.8 G/DL (ref 13.6–17.2)
HGB UR QL STRIP: NEGATIVE
IMM GRANULOCYTES # BLD AUTO: 0 K/UL (ref 0–0.5)
IMM GRANULOCYTES NFR BLD AUTO: 0 % (ref 0–5)
KETONES UR QL STRIP.AUTO: NEGATIVE MG/DL
LDLC SERPL CALC-MCNC: 56 MG/DL (ref 0–100)
LEUKOCYTE ESTERASE UR QL STRIP.AUTO: NEGATIVE
LYMPHOCYTES # BLD: 2 K/UL (ref 0.5–4.6)
LYMPHOCYTES NFR BLD: 23 % (ref 13–44)
MCH RBC QN AUTO: 31 PG (ref 26.1–32.9)
MCHC RBC AUTO-ENTMCNC: 32 G/DL (ref 31.4–35)
MCV RBC AUTO: 96.9 FL (ref 82–102)
MONOCYTES # BLD: 0.9 K/UL (ref 0.1–1.3)
MONOCYTES NFR BLD: 11 % (ref 4–12)
NEUTS SEG # BLD: 5.5 K/UL (ref 1.7–8.2)
NEUTS SEG NFR BLD: 64 % (ref 43–78)
NITRITE UR QL STRIP.AUTO: NEGATIVE
NRBC # BLD: 0 K/UL (ref 0–0.2)
PH UR STRIP: 5.5 (ref 5–9)
PLATELET # BLD AUTO: 238 K/UL (ref 150–450)
PMV BLD AUTO: 11.8 FL (ref 9.4–12.3)
POTASSIUM SERPL-SCNC: 4.2 MMOL/L (ref 3.5–5.1)
PROT SERPL-MCNC: 6.7 G/DL (ref 6.3–8.2)
PROT UR STRIP-MCNC: NEGATIVE MG/DL
RBC # BLD AUTO: 4.13 M/UL (ref 4.23–5.6)
SODIUM SERPL-SCNC: 142 MMOL/L (ref 136–145)
SP GR UR REFRACTOMETRY: 1.02 (ref 1–1.02)
TRIGL SERPL-MCNC: 96 MG/DL (ref 0–150)
TSH, 3RD GENERATION: 1.8 UIU/ML (ref 0.27–4.2)
UROBILINOGEN UR QL STRIP.AUTO: 0.2 EU/DL (ref 0.2–1)
VLDLC SERPL CALC-MCNC: 19 MG/DL (ref 6–23)
WBC # BLD AUTO: 8.6 K/UL (ref 4.3–11.1)

## 2024-09-11 RX ORDER — MONTELUKAST SODIUM 10 MG/1
10 TABLET ORAL DAILY
Qty: 90 TABLET | Refills: 1 | Status: SHIPPED | OUTPATIENT
Start: 2024-09-11

## 2024-09-11 RX ORDER — BUPROPION HYDROCHLORIDE 150 MG/1
150 TABLET ORAL EVERY MORNING
Qty: 90 TABLET | Refills: 1 | Status: SHIPPED | OUTPATIENT
Start: 2024-09-11

## 2024-09-11 SDOH — ECONOMIC STABILITY: FOOD INSECURITY: WITHIN THE PAST 12 MONTHS, YOU WORRIED THAT YOUR FOOD WOULD RUN OUT BEFORE YOU GOT MONEY TO BUY MORE.: NEVER TRUE

## 2024-09-11 SDOH — ECONOMIC STABILITY: FOOD INSECURITY: WITHIN THE PAST 12 MONTHS, THE FOOD YOU BOUGHT JUST DIDN'T LAST AND YOU DIDN'T HAVE MONEY TO GET MORE.: NEVER TRUE

## 2024-09-11 SDOH — ECONOMIC STABILITY: INCOME INSECURITY: HOW HARD IS IT FOR YOU TO PAY FOR THE VERY BASICS LIKE FOOD, HOUSING, MEDICAL CARE, AND HEATING?: NOT HARD AT ALL

## 2024-09-11 ASSESSMENT — PATIENT HEALTH QUESTIONNAIRE - PHQ9
SUM OF ALL RESPONSES TO PHQ QUESTIONS 1-9: 0
2. FEELING DOWN, DEPRESSED OR HOPELESS: NOT AT ALL
7. TROUBLE CONCENTRATING ON THINGS, SUCH AS READING THE NEWSPAPER OR WATCHING TELEVISION: NOT AT ALL
SUM OF ALL RESPONSES TO PHQ QUESTIONS 1-9: 0
4. FEELING TIRED OR HAVING LITTLE ENERGY: NOT AT ALL
6. FEELING BAD ABOUT YOURSELF - OR THAT YOU ARE A FAILURE OR HAVE LET YOURSELF OR YOUR FAMILY DOWN: NOT AT ALL
5. POOR APPETITE OR OVEREATING: NOT AT ALL
1. LITTLE INTEREST OR PLEASURE IN DOING THINGS: NOT AT ALL
SUM OF ALL RESPONSES TO PHQ QUESTIONS 1-9: 0
SUM OF ALL RESPONSES TO PHQ9 QUESTIONS 1 & 2: 0
3. TROUBLE FALLING OR STAYING ASLEEP: NOT AT ALL
9. THOUGHTS THAT YOU WOULD BE BETTER OFF DEAD, OR OF HURTING YOURSELF: NOT AT ALL
SUM OF ALL RESPONSES TO PHQ QUESTIONS 1-9: 0
10. IF YOU CHECKED OFF ANY PROBLEMS, HOW DIFFICULT HAVE THESE PROBLEMS MADE IT FOR YOU TO DO YOUR WORK, TAKE CARE OF THINGS AT HOME, OR GET ALONG WITH OTHER PEOPLE: NOT DIFFICULT AT ALL
8. MOVING OR SPEAKING SO SLOWLY THAT OTHER PEOPLE COULD HAVE NOTICED. OR THE OPPOSITE, BEING SO FIGETY OR RESTLESS THAT YOU HAVE BEEN MOVING AROUND A LOT MORE THAN USUAL: NOT AT ALL

## 2024-09-12 LAB — PSA SERPL DL<=0.01 NG/ML-MCNC: 2.08 NG/ML (ref 0–4)

## 2024-09-13 LAB — CRP SERPL-MCNC: <1 MG/L (ref 0–10)

## 2024-11-19 ENCOUNTER — TELEPHONE (OUTPATIENT)
Age: 79
End: 2024-11-19

## 2024-11-19 NOTE — TELEPHONE ENCOUNTER
Cardiac Clearance        Physician or Practice Requesting:[facility]   : Dr.Brent Kevin   Contact Phone Number: [phone]  Fax Number: [phone]  Date of Surgery/Procedure: TB  Type of Surgery or Procedure: Excision of nasal lesion with Local flap crosuve   Type of Anesthesia: General   Type of Clearance Requested: risk assessment and any medication hold   Medication to Hold:?  Days to Hold: /

## 2024-11-20 NOTE — TELEPHONE ENCOUNTER
Lalito Almaraz MD  You23 hours ago (1:00 PM)       Low, clear, no med to hold       Faxed a copy of this encounter to the requested number.

## 2024-11-22 ENCOUNTER — APPOINTMENT (OUTPATIENT)
Dept: GENERAL RADIOLOGY | Age: 79
DRG: 522 | End: 2024-11-22
Payer: MEDICARE

## 2024-11-22 ENCOUNTER — ANESTHESIA EVENT (OUTPATIENT)
Dept: SURGERY | Age: 79
End: 2024-11-22
Payer: MEDICARE

## 2024-11-22 ENCOUNTER — ANESTHESIA (OUTPATIENT)
Dept: SURGERY | Age: 79
End: 2024-11-22
Payer: MEDICARE

## 2024-11-22 ENCOUNTER — HOSPITAL ENCOUNTER (INPATIENT)
Age: 79
LOS: 7 days | Discharge: NURSING, REHAB OR LONG-TERM CARE | DRG: 522 | End: 2024-11-29
Attending: STUDENT IN AN ORGANIZED HEALTH CARE EDUCATION/TRAINING PROGRAM | Admitting: INTERNAL MEDICINE
Payer: MEDICARE

## 2024-11-22 DIAGNOSIS — R52 ACUTE PAIN: ICD-10-CM

## 2024-11-22 DIAGNOSIS — S72.001A DISPLACED FRACTURE OF RIGHT FEMORAL NECK (HCC): ICD-10-CM
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
## 2024-11-22 DIAGNOSIS — S72.001A CLOSED FRACTURE OF NECK OF RIGHT FEMUR, INITIAL ENCOUNTER (HCC): Primary | ICD-10-CM

## 2024-11-22 DIAGNOSIS — I48.91 ATRIAL FIBRILLATION, UNSPECIFIED TYPE (HCC): ICD-10-CM

## 2024-11-22 PROBLEM — S72.011A SUBCAPITAL FRACTURE OF FEMUR, RIGHT, CLOSED, INITIAL ENCOUNTER (HCC): Status: ACTIVE | Noted: 2024-11-22

## 2024-11-22 LAB
ABO + RH BLD: NORMAL
ANION GAP SERPL CALC-SCNC: 11 MMOL/L (ref 7–16)
ANION GAP SERPL CALC-SCNC: 9 MMOL/L (ref 7–16)
BASOPHILS # BLD: 0 K/UL (ref 0–0.2)
BASOPHILS NFR BLD: 0 % (ref 0–2)
BLOOD GROUP ANTIBODIES SERPL: NORMAL
BUN SERPL-MCNC: 18 MG/DL (ref 8–23)
BUN SERPL-MCNC: 19 MG/DL (ref 8–23)
CALCIUM SERPL-MCNC: 8.8 MG/DL (ref 8.8–10.2)
CALCIUM SERPL-MCNC: 9 MG/DL (ref 8.8–10.2)
CHLORIDE SERPL-SCNC: 103 MMOL/L (ref 98–107)
CHLORIDE SERPL-SCNC: 104 MMOL/L (ref 98–107)
CO2 SERPL-SCNC: 24 MMOL/L (ref 20–29)
CO2 SERPL-SCNC: 26 MMOL/L (ref 20–29)
CREAT SERPL-MCNC: 1.24 MG/DL (ref 0.8–1.3)
CREAT SERPL-MCNC: 1.32 MG/DL (ref 0.8–1.3)
DIFFERENTIAL METHOD BLD: ABNORMAL
EOSINOPHIL # BLD: 0 K/UL (ref 0–0.8)
EOSINOPHIL NFR BLD: 0 % (ref 0.5–7.8)
ERYTHROCYTE [DISTWIDTH] IN BLOOD BY AUTOMATED COUNT: 13.2 % (ref 11.9–14.6)
GLUCOSE SERPL-MCNC: 107 MG/DL (ref 70–99)
GLUCOSE SERPL-MCNC: 131 MG/DL (ref 70–99)
HCT VFR BLD AUTO: 36.7 % (ref 41.1–50.3)
HGB BLD-MCNC: 12 G/DL (ref 13.6–17.2)
IMM GRANULOCYTES # BLD AUTO: 0.1 K/UL (ref 0–0.5)
IMM GRANULOCYTES NFR BLD AUTO: 1 % (ref 0–5)
LYMPHOCYTES # BLD: 1.4 K/UL (ref 0.5–4.6)
LYMPHOCYTES NFR BLD: 8 % (ref 13–44)
MCH RBC QN AUTO: 30.4 PG (ref 26.1–32.9)
MCHC RBC AUTO-ENTMCNC: 32.7 G/DL (ref 31.4–35)
MCV RBC AUTO: 92.9 FL (ref 82–102)
MONOCYTES # BLD: 1.1 K/UL (ref 0.1–1.3)
MONOCYTES NFR BLD: 6 % (ref 4–12)
NEUTS SEG # BLD: 14.3 K/UL (ref 1.7–8.2)
NEUTS SEG NFR BLD: 85 % (ref 43–78)
NRBC # BLD: 0 K/UL (ref 0–0.2)
PLATELET # BLD AUTO: 211 K/UL (ref 150–450)
PMV BLD AUTO: 11 FL (ref 9.4–12.3)
POTASSIUM SERPL-SCNC: 4.7 MMOL/L (ref 3.5–5.1)
POTASSIUM SERPL-SCNC: 4.7 MMOL/L (ref 3.5–5.1)
RBC # BLD AUTO: 3.95 M/UL (ref 4.23–5.6)
SODIUM SERPL-SCNC: 137 MMOL/L (ref 136–145)
SODIUM SERPL-SCNC: 139 MMOL/L (ref 136–145)
SPECIMEN EXP DATE BLD: NORMAL
WBC # BLD AUTO: 16.9 K/UL (ref 4.3–11.1)

## 2024-11-22 PROCEDURE — 73502 X-RAY EXAM HIP UNI 2-3 VIEWS: CPT

## 2024-11-22 PROCEDURE — 99223 1ST HOSP IP/OBS HIGH 75: CPT | Performed by: ORTHOPAEDIC SURGERY

## 2024-11-22 PROCEDURE — 6370000000 HC RX 637 (ALT 250 FOR IP): Performed by: INTERNAL MEDICINE

## 2024-11-22 PROCEDURE — 0SRR0JA REPLACEMENT OF RIGHT HIP JOINT, FEMORAL SURFACE WITH SYNTHETIC SUBSTITUTE, UNCEMENTED, OPEN APPROACH: ICD-10-PCS | Performed by: ORTHOPAEDIC SURGERY

## 2024-11-22 PROCEDURE — 6360000002 HC RX W HCPCS: Performed by: ORTHOPAEDIC SURGERY

## 2024-11-22 PROCEDURE — 71045 X-RAY EXAM CHEST 1 VIEW: CPT

## 2024-11-22 PROCEDURE — 86901 BLOOD TYPING SEROLOGIC RH(D): CPT

## 2024-11-22 PROCEDURE — 2709999900 HC NON-CHARGEABLE SUPPLY: Performed by: ORTHOPAEDIC SURGERY

## 2024-11-22 PROCEDURE — 6370000000 HC RX 637 (ALT 250 FOR IP): Performed by: ORTHOPAEDIC SURGERY

## 2024-11-22 PROCEDURE — 7100000001 HC PACU RECOVERY - ADDTL 15 MIN: Performed by: ORTHOPAEDIC SURGERY

## 2024-11-22 PROCEDURE — 6360000002 HC RX W HCPCS: Performed by: STUDENT IN AN ORGANIZED HEALTH CARE EDUCATION/TRAINING PROGRAM

## 2024-11-22 PROCEDURE — 96374 THER/PROPH/DIAG INJ IV PUSH: CPT

## 2024-11-22 PROCEDURE — 7100000000 HC PACU RECOVERY - FIRST 15 MIN: Performed by: ORTHOPAEDIC SURGERY

## 2024-11-22 PROCEDURE — 3600000005 HC SURGERY LEVEL 5 BASE: Performed by: ORTHOPAEDIC SURGERY

## 2024-11-22 PROCEDURE — L1830 KO IMMOB CANVAS LONG PRE OTS: HCPCS | Performed by: ORTHOPAEDIC SURGERY

## 2024-11-22 PROCEDURE — 85025 COMPLETE CBC W/AUTO DIFF WBC: CPT

## 2024-11-22 PROCEDURE — 2580000003 HC RX 258: Performed by: ORTHOPAEDIC SURGERY

## 2024-11-22 PROCEDURE — 2580000003 HC RX 258: Performed by: ANESTHESIOLOGY

## 2024-11-22 PROCEDURE — 2500000003 HC RX 250 WO HCPCS: Performed by: REGISTERED NURSE

## 2024-11-22 PROCEDURE — 6370000000 HC RX 637 (ALT 250 FOR IP): Performed by: FAMILY MEDICINE

## 2024-11-22 PROCEDURE — 6370000000 HC RX 637 (ALT 250 FOR IP): Performed by: ANESTHESIOLOGY

## 2024-11-22 PROCEDURE — 1100000003 HC PRIVATE W/ TELEMETRY

## 2024-11-22 PROCEDURE — 36415 COLL VENOUS BLD VENIPUNCTURE: CPT

## 2024-11-22 PROCEDURE — 6360000002 HC RX W HCPCS: Performed by: REGISTERED NURSE

## 2024-11-22 PROCEDURE — 72170 X-RAY EXAM OF PELVIS: CPT

## 2024-11-22 PROCEDURE — 3700000000 HC ANESTHESIA ATTENDED CARE: Performed by: ORTHOPAEDIC SURGERY

## 2024-11-22 PROCEDURE — C1776 JOINT DEVICE (IMPLANTABLE): HCPCS | Performed by: ORTHOPAEDIC SURGERY

## 2024-11-22 PROCEDURE — 99285 EMERGENCY DEPT VISIT HI MDM: CPT

## 2024-11-22 PROCEDURE — 3600000015 HC SURGERY LEVEL 5 ADDTL 15MIN: Performed by: ORTHOPAEDIC SURGERY

## 2024-11-22 PROCEDURE — 86900 BLOOD TYPING SEROLOGIC ABO: CPT

## 2024-11-22 PROCEDURE — 3700000001 HC ADD 15 MINUTES (ANESTHESIA): Performed by: ORTHOPAEDIC SURGERY

## 2024-11-22 PROCEDURE — 80048 BASIC METABOLIC PNL TOTAL CA: CPT

## 2024-11-22 PROCEDURE — 86850 RBC ANTIBODY SCREEN: CPT

## 2024-11-22 PROCEDURE — 73552 X-RAY EXAM OF FEMUR 2/>: CPT

## 2024-11-22 DEVICE — IMPLANTABLE DEVICE: Type: IMPLANTABLE DEVICE | Site: HIP | Status: FUNCTIONAL

## 2024-11-22 DEVICE — HEAD FEM DIA28MM +5MM OFFSET STD 12/14 TAPR ARTC EZ HIP MTL: Type: IMPLANTABLE DEVICE | Site: HIP | Status: FUNCTIONAL

## 2024-11-22 DEVICE — HIP H4 TOT HEMI UNIV BIPLR IMPL CAPPED H4 SN: Type: IMPLANTABLE DEVICE | Site: HIP | Status: FUNCTIONAL

## 2024-11-22 DEVICE — HEAD BPLR OD53MM ID28MM FEM HIP SELF CNTR: Type: IMPLANTABLE DEVICE | Site: HIP | Status: FUNCTIONAL

## 2024-11-22 RX ORDER — ASPIRIN 81 MG/1
81 TABLET ORAL DAILY
Status: DISCONTINUED | OUTPATIENT
Start: 2024-11-22 | End: 2024-11-22 | Stop reason: SDUPTHER

## 2024-11-22 RX ORDER — ASPIRIN 325 MG
325 TABLET, DELAYED RELEASE (ENTERIC COATED) ORAL DAILY
Status: DISCONTINUED | OUTPATIENT
Start: 2024-11-22 | End: 2024-11-25

## 2024-11-22 RX ORDER — POTASSIUM CHLORIDE 1500 MG/1
40 TABLET, EXTENDED RELEASE ORAL PRN
Status: DISCONTINUED | OUTPATIENT
Start: 2024-11-22 | End: 2024-11-29 | Stop reason: HOSPADM

## 2024-11-22 RX ORDER — POTASSIUM CHLORIDE 7.45 MG/ML
10 INJECTION INTRAVENOUS PRN
Status: DISCONTINUED | OUTPATIENT
Start: 2024-11-22 | End: 2024-11-29 | Stop reason: HOSPADM

## 2024-11-22 RX ORDER — EPHEDRINE SULFATE 5 MG/ML
INJECTION INTRAVENOUS
Status: DISCONTINUED | OUTPATIENT
Start: 2024-11-22 | End: 2024-11-22 | Stop reason: SDUPTHER

## 2024-11-22 RX ORDER — METOPROLOL SUCCINATE 25 MG/1
25 TABLET, EXTENDED RELEASE ORAL DAILY
Status: DISCONTINUED | OUTPATIENT
Start: 2024-11-22 | End: 2024-11-24

## 2024-11-22 RX ORDER — SODIUM CHLORIDE 0.9 % (FLUSH) 0.9 %
5-40 SYRINGE (ML) INJECTION PRN
Status: DISCONTINUED | OUTPATIENT
Start: 2024-11-22 | End: 2024-11-29 | Stop reason: HOSPADM

## 2024-11-22 RX ORDER — MAGNESIUM HYDROXIDE/ALUMINUM HYDROXICE/SIMETHICONE 120; 1200; 1200 MG/30ML; MG/30ML; MG/30ML
30 SUSPENSION ORAL EVERY 6 HOURS PRN
Status: DISCONTINUED | OUTPATIENT
Start: 2024-11-22 | End: 2024-11-29 | Stop reason: HOSPADM

## 2024-11-22 RX ORDER — METOPROLOL TARTRATE 1 MG/ML
INJECTION, SOLUTION INTRAVENOUS
Status: DISCONTINUED | OUTPATIENT
Start: 2024-11-22 | End: 2024-11-22 | Stop reason: SDUPTHER

## 2024-11-22 RX ORDER — LIDOCAINE HYDROCHLORIDE 20 MG/ML
INJECTION, SOLUTION EPIDURAL; INFILTRATION; INTRACAUDAL; PERINEURAL
Status: DISCONTINUED | OUTPATIENT
Start: 2024-11-22 | End: 2024-11-22 | Stop reason: SDUPTHER

## 2024-11-22 RX ORDER — CETIRIZINE HYDROCHLORIDE 10 MG/1
10 TABLET ORAL DAILY
Status: DISCONTINUED | OUTPATIENT
Start: 2024-11-22 | End: 2024-11-29 | Stop reason: HOSPADM

## 2024-11-22 RX ORDER — NALOXONE HYDROCHLORIDE 0.4 MG/ML
INJECTION, SOLUTION INTRAMUSCULAR; INTRAVENOUS; SUBCUTANEOUS PRN
Status: DISCONTINUED | OUTPATIENT
Start: 2024-11-22 | End: 2024-11-22 | Stop reason: HOSPADM

## 2024-11-22 RX ORDER — BISACODYL 10 MG
10 SUPPOSITORY, RECTAL RECTAL DAILY PRN
Status: DISCONTINUED | OUTPATIENT
Start: 2024-11-22 | End: 2024-11-29 | Stop reason: HOSPADM

## 2024-11-22 RX ORDER — BUPIVACAINE HYDROCHLORIDE 5 MG/ML
INJECTION, SOLUTION EPIDURAL; INTRACAUDAL PRN
Status: DISCONTINUED | OUTPATIENT
Start: 2024-11-22 | End: 2024-11-22 | Stop reason: ALTCHOICE

## 2024-11-22 RX ORDER — ONDANSETRON 2 MG/ML
4 INJECTION INTRAMUSCULAR; INTRAVENOUS
Status: DISCONTINUED | OUTPATIENT
Start: 2024-11-22 | End: 2024-11-22 | Stop reason: HOSPADM

## 2024-11-22 RX ORDER — LIDOCAINE HYDROCHLORIDE 10 MG/ML
1 INJECTION, SOLUTION INFILTRATION; PERINEURAL
Status: DISCONTINUED | OUTPATIENT
Start: 2024-11-22 | End: 2024-11-22 | Stop reason: HOSPADM

## 2024-11-22 RX ORDER — ONDANSETRON 2 MG/ML
4 INJECTION INTRAMUSCULAR; INTRAVENOUS EVERY 6 HOURS PRN
Status: DISCONTINUED | OUTPATIENT
Start: 2024-11-22 | End: 2024-11-29 | Stop reason: HOSPADM

## 2024-11-22 RX ORDER — DILTIAZEM HYDROCHLORIDE 5 MG/ML
INJECTION INTRAVENOUS
Status: DISCONTINUED | OUTPATIENT
Start: 2024-11-22 | End: 2024-11-22 | Stop reason: SDUPTHER

## 2024-11-22 RX ORDER — BUPROPION HYDROCHLORIDE 150 MG/1
150 TABLET ORAL EVERY MORNING
Status: DISCONTINUED | OUTPATIENT
Start: 2024-11-22 | End: 2024-11-29 | Stop reason: HOSPADM

## 2024-11-22 RX ORDER — MONTELUKAST SODIUM 10 MG/1
10 TABLET ORAL DAILY
Status: DISCONTINUED | OUTPATIENT
Start: 2024-11-22 | End: 2024-11-29 | Stop reason: HOSPADM

## 2024-11-22 RX ORDER — BISACODYL 5 MG/1
5 TABLET, DELAYED RELEASE ORAL DAILY
Status: DISCONTINUED | OUTPATIENT
Start: 2024-11-22 | End: 2024-11-26

## 2024-11-22 RX ORDER — HYDROCODONE BITARTRATE AND ACETAMINOPHEN 5; 325 MG/1; MG/1
1 TABLET ORAL EVERY 4 HOURS PRN
Status: DISCONTINUED | OUTPATIENT
Start: 2024-11-22 | End: 2024-11-29 | Stop reason: HOSPADM

## 2024-11-22 RX ORDER — FAMOTIDINE 20 MG/1
10 TABLET, FILM COATED ORAL DAILY PRN
Status: DISCONTINUED | OUTPATIENT
Start: 2024-11-22 | End: 2024-11-29 | Stop reason: HOSPADM

## 2024-11-22 RX ORDER — HYDROMORPHONE HYDROCHLORIDE 2 MG/ML
0.25 INJECTION, SOLUTION INTRAMUSCULAR; INTRAVENOUS; SUBCUTANEOUS EVERY 5 MIN PRN
Status: DISCONTINUED | OUTPATIENT
Start: 2024-11-22 | End: 2024-11-22 | Stop reason: HOSPADM

## 2024-11-22 RX ORDER — MAGNESIUM SULFATE IN WATER 40 MG/ML
2000 INJECTION, SOLUTION INTRAVENOUS PRN
Status: DISCONTINUED | OUTPATIENT
Start: 2024-11-22 | End: 2024-11-29 | Stop reason: HOSPADM

## 2024-11-22 RX ORDER — ACETAMINOPHEN 325 MG/1
650 TABLET ORAL EVERY 4 HOURS PRN
Status: DISCONTINUED | OUTPATIENT
Start: 2024-11-22 | End: 2024-11-29 | Stop reason: HOSPADM

## 2024-11-22 RX ORDER — ESMOLOL HYDROCHLORIDE 10 MG/ML
INJECTION INTRAVENOUS
Status: DISCONTINUED | OUTPATIENT
Start: 2024-11-22 | End: 2024-11-22 | Stop reason: SDUPTHER

## 2024-11-22 RX ORDER — MORPHINE SULFATE 2 MG/ML
2 INJECTION, SOLUTION INTRAMUSCULAR; INTRAVENOUS EVERY 4 HOURS PRN
Status: DISCONTINUED | OUTPATIENT
Start: 2024-11-22 | End: 2024-11-29 | Stop reason: HOSPADM

## 2024-11-22 RX ORDER — PANTOPRAZOLE SODIUM 40 MG/1
40 TABLET, DELAYED RELEASE ORAL
Status: DISCONTINUED | OUTPATIENT
Start: 2024-11-22 | End: 2024-11-29 | Stop reason: HOSPADM

## 2024-11-22 RX ORDER — SODIUM CHLORIDE, SODIUM LACTATE, POTASSIUM CHLORIDE, CALCIUM CHLORIDE 600; 310; 30; 20 MG/100ML; MG/100ML; MG/100ML; MG/100ML
INJECTION, SOLUTION INTRAVENOUS CONTINUOUS
Status: DISCONTINUED | OUTPATIENT
Start: 2024-11-22 | End: 2024-11-22 | Stop reason: HOSPADM

## 2024-11-22 RX ORDER — SODIUM CHLORIDE 0.9 % (FLUSH) 0.9 %
5-40 SYRINGE (ML) INJECTION EVERY 12 HOURS SCHEDULED
Status: DISCONTINUED | OUTPATIENT
Start: 2024-11-22 | End: 2024-11-22 | Stop reason: HOSPADM

## 2024-11-22 RX ORDER — NITROGLYCERIN 0.4 MG/1
0.4 TABLET SUBLINGUAL EVERY 5 MIN PRN
Status: DISCONTINUED | OUTPATIENT
Start: 2024-11-22 | End: 2024-11-29 | Stop reason: HOSPADM

## 2024-11-22 RX ORDER — NEOSTIGMINE METHYLSULFATE 1 MG/ML
INJECTION INTRAVENOUS
Status: DISCONTINUED | OUTPATIENT
Start: 2024-11-22 | End: 2024-11-22 | Stop reason: SDUPTHER

## 2024-11-22 RX ORDER — VITAMIN B COMPLEX
1000 TABLET ORAL DAILY
Status: DISCONTINUED | OUTPATIENT
Start: 2024-11-22 | End: 2024-11-29 | Stop reason: HOSPADM

## 2024-11-22 RX ORDER — LISINOPRIL 20 MG/1
20 TABLET ORAL DAILY
Status: DISCONTINUED | OUTPATIENT
Start: 2024-11-22 | End: 2024-11-25

## 2024-11-22 RX ORDER — POLYETHYLENE GLYCOL 3350 17 G/17G
17 POWDER, FOR SOLUTION ORAL DAILY PRN
Status: DISCONTINUED | OUTPATIENT
Start: 2024-11-22 | End: 2024-11-26

## 2024-11-22 RX ORDER — SODIUM CHLORIDE 9 MG/ML
INJECTION, SOLUTION INTRAVENOUS PRN
Status: DISCONTINUED | OUTPATIENT
Start: 2024-11-22 | End: 2024-11-29 | Stop reason: HOSPADM

## 2024-11-22 RX ORDER — AMLODIPINE BESYLATE 5 MG/1
5 TABLET ORAL DAILY
Status: DISCONTINUED | OUTPATIENT
Start: 2024-11-22 | End: 2024-11-25

## 2024-11-22 RX ORDER — DEXMEDETOMIDINE HYDROCHLORIDE 100 UG/ML
INJECTION, SOLUTION INTRAVENOUS
Status: DISCONTINUED | OUTPATIENT
Start: 2024-11-22 | End: 2024-11-22 | Stop reason: SDUPTHER

## 2024-11-22 RX ORDER — FENTANYL CITRATE 50 UG/ML
INJECTION, SOLUTION INTRAMUSCULAR; INTRAVENOUS
Status: DISCONTINUED | OUTPATIENT
Start: 2024-11-22 | End: 2024-11-22 | Stop reason: SDUPTHER

## 2024-11-22 RX ORDER — ACETAMINOPHEN 325 MG/1
650 TABLET ORAL EVERY 6 HOURS PRN
Status: DISCONTINUED | OUTPATIENT
Start: 2024-11-22 | End: 2024-11-22

## 2024-11-22 RX ORDER — SODIUM CHLORIDE 9 MG/ML
INJECTION, SOLUTION INTRAVENOUS PRN
Status: DISCONTINUED | OUTPATIENT
Start: 2024-11-22 | End: 2024-11-22 | Stop reason: HOSPADM

## 2024-11-22 RX ORDER — MORPHINE SULFATE 4 MG/ML
4 INJECTION, SOLUTION INTRAMUSCULAR; INTRAVENOUS
Status: COMPLETED | OUTPATIENT
Start: 2024-11-22 | End: 2024-11-22

## 2024-11-22 RX ORDER — GLYCOPYRROLATE 0.2 MG/ML
INJECTION INTRAMUSCULAR; INTRAVENOUS
Status: DISCONTINUED | OUTPATIENT
Start: 2024-11-22 | End: 2024-11-22 | Stop reason: SDUPTHER

## 2024-11-22 RX ORDER — ONDANSETRON 4 MG/1
4 TABLET, ORALLY DISINTEGRATING ORAL EVERY 8 HOURS PRN
Status: DISCONTINUED | OUTPATIENT
Start: 2024-11-22 | End: 2024-11-29 | Stop reason: HOSPADM

## 2024-11-22 RX ORDER — PROPOFOL 10 MG/ML
INJECTION, EMULSION INTRAVENOUS
Status: DISCONTINUED | OUTPATIENT
Start: 2024-11-22 | End: 2024-11-22 | Stop reason: SDUPTHER

## 2024-11-22 RX ORDER — ATORVASTATIN CALCIUM 80 MG/1
80 TABLET, FILM COATED ORAL NIGHTLY
Status: DISCONTINUED | OUTPATIENT
Start: 2024-11-22 | End: 2024-11-29 | Stop reason: HOSPADM

## 2024-11-22 RX ORDER — ROCURONIUM BROMIDE 10 MG/ML
INJECTION, SOLUTION INTRAVENOUS
Status: DISCONTINUED | OUTPATIENT
Start: 2024-11-22 | End: 2024-11-22 | Stop reason: SDUPTHER

## 2024-11-22 RX ORDER — AMLODIPINE AND BENAZEPRIL HYDROCHLORIDE 5; 20 MG/1; MG/1
1 CAPSULE ORAL DAILY
Status: DISCONTINUED | OUTPATIENT
Start: 2024-11-22 | End: 2024-11-22 | Stop reason: CLARIF

## 2024-11-22 RX ORDER — OXYCODONE HYDROCHLORIDE 5 MG/1
5 TABLET ORAL
Status: DISCONTINUED | OUTPATIENT
Start: 2024-11-22 | End: 2024-11-22 | Stop reason: HOSPADM

## 2024-11-22 RX ORDER — SODIUM CHLORIDE 0.9 % (FLUSH) 0.9 %
5-40 SYRINGE (ML) INJECTION PRN
Status: DISCONTINUED | OUTPATIENT
Start: 2024-11-22 | End: 2024-11-22 | Stop reason: HOSPADM

## 2024-11-22 RX ORDER — HYDROCODONE BITARTRATE AND ACETAMINOPHEN 10; 325 MG/1; MG/1
1 TABLET ORAL EVERY 6 HOURS PRN
Status: DISCONTINUED | OUTPATIENT
Start: 2024-11-22 | End: 2024-11-29 | Stop reason: HOSPADM

## 2024-11-22 RX ORDER — ACETAMINOPHEN 650 MG/1
650 SUPPOSITORY RECTAL EVERY 6 HOURS PRN
Status: DISCONTINUED | OUTPATIENT
Start: 2024-11-22 | End: 2024-11-22

## 2024-11-22 RX ORDER — SODIUM CHLORIDE 0.9 % (FLUSH) 0.9 %
5-40 SYRINGE (ML) INJECTION EVERY 12 HOURS SCHEDULED
Status: DISCONTINUED | OUTPATIENT
Start: 2024-11-22 | End: 2024-11-29 | Stop reason: HOSPADM

## 2024-11-22 RX ORDER — ACETAMINOPHEN 500 MG
1000 TABLET ORAL ONCE
Status: COMPLETED | OUTPATIENT
Start: 2024-11-22 | End: 2024-11-22

## 2024-11-22 RX ADMIN — ESMOLOL HYDROCHLORIDE 20 MG: 10 INJECTION INTRAVENOUS at 10:59

## 2024-11-22 RX ADMIN — PROPOFOL 150 MG: 10 INJECTION, EMULSION INTRAVENOUS at 10:25

## 2024-11-22 RX ADMIN — SODIUM CHLORIDE, SODIUM LACTATE, POTASSIUM CHLORIDE, AND CALCIUM CHLORIDE: 600; 310; 30; 20 INJECTION, SOLUTION INTRAVENOUS at 10:13

## 2024-11-22 RX ADMIN — AMLODIPINE BESYLATE 5 MG: 10 TABLET ORAL at 08:53

## 2024-11-22 RX ADMIN — MORPHINE SULFATE 4 MG: 4 INJECTION INTRAVENOUS at 05:02

## 2024-11-22 RX ADMIN — ACETAMINOPHEN 1000 MG: 500 TABLET, FILM COATED ORAL at 09:45

## 2024-11-22 RX ADMIN — SODIUM CHLORIDE, PRESERVATIVE FREE 10 ML: 5 INJECTION INTRAVENOUS at 22:23

## 2024-11-22 RX ADMIN — ASPIRIN 325 MG: 325 TABLET, COATED ORAL at 17:54

## 2024-11-22 RX ADMIN — MONTELUKAST 10 MG: 10 TABLET, FILM COATED ORAL at 17:53

## 2024-11-22 RX ADMIN — ASPIRIN 81 MG: 81 TABLET, COATED ORAL at 08:53

## 2024-11-22 RX ADMIN — HYDROCODONE BITARTRATE AND ACETAMINOPHEN 1 TABLET: 5; 325 TABLET ORAL at 17:53

## 2024-11-22 RX ADMIN — Medication 2000 MG: at 12:06

## 2024-11-22 RX ADMIN — METOPROLOL TARTRATE 2.5 MG: 1 INJECTION, SOLUTION INTRAVENOUS at 11:01

## 2024-11-22 RX ADMIN — FENTANYL CITRATE 50 MCG: 50 INJECTION, SOLUTION INTRAMUSCULAR; INTRAVENOUS at 10:17

## 2024-11-22 RX ADMIN — METOPROLOL SUCCINATE 25 MG: 25 TABLET, EXTENDED RELEASE ORAL at 08:53

## 2024-11-22 RX ADMIN — BISACODYL 5 MG: 5 TABLET, COATED ORAL at 17:54

## 2024-11-22 RX ADMIN — BUPROPION HYDROCHLORIDE 150 MG: 150 TABLET, EXTENDED RELEASE ORAL at 17:53

## 2024-11-22 RX ADMIN — CETIRIZINE HYDROCHLORIDE 10 MG: 10 TABLET, FILM COATED ORAL at 17:53

## 2024-11-22 RX ADMIN — EPHEDRINE SULFATE 10 MG: 5 INJECTION INTRAVENOUS at 10:41

## 2024-11-22 RX ADMIN — CEFAZOLIN 2000 MG: 2 INJECTION, POWDER, FOR SOLUTION INTRAMUSCULAR; INTRAVENOUS at 11:15

## 2024-11-22 RX ADMIN — EPHEDRINE SULFATE 10 MG: 5 INJECTION INTRAVENOUS at 10:30

## 2024-11-22 RX ADMIN — LISINOPRIL 20 MG: 20 TABLET ORAL at 17:53

## 2024-11-22 RX ADMIN — ESMOLOL HYDROCHLORIDE 20 MG: 10 INJECTION INTRAVENOUS at 10:56

## 2024-11-22 RX ADMIN — EPHEDRINE SULFATE 10 MG: 5 INJECTION INTRAVENOUS at 10:52

## 2024-11-22 RX ADMIN — GLYCOPYRROLATE 0.6 MG: 0.2 INJECTION INTRAMUSCULAR; INTRAVENOUS at 12:26

## 2024-11-22 RX ADMIN — SODIUM CHLORIDE, PRESERVATIVE FREE 10 ML: 5 INJECTION INTRAVENOUS at 17:55

## 2024-11-22 RX ADMIN — DILTIAZEM HYDROCHLORIDE 5 MG: 5 INJECTION, SOLUTION INTRAVENOUS at 11:06

## 2024-11-22 RX ADMIN — NEOSTIGMINE METHYLSULFATE 3 MG: 1 INJECTION, SOLUTION INTRAVENOUS at 12:26

## 2024-11-22 RX ADMIN — VITAMIN D, TAB 1000IU (100/BT) 1000 UNITS: 25 TAB at 17:53

## 2024-11-22 RX ADMIN — ROCURONIUM BROMIDE 50 MG: 10 INJECTION, SOLUTION INTRAVENOUS at 10:25

## 2024-11-22 RX ADMIN — ATORVASTATIN CALCIUM 80 MG: 80 TABLET, FILM COATED ORAL at 22:23

## 2024-11-22 RX ADMIN — DEXMEDETOMIDINE 4 MCG: 100 INJECTION, SOLUTION, CONCENTRATE INTRAVENOUS at 10:25

## 2024-11-22 RX ADMIN — CEFAZOLIN 2000 MG: 2 INJECTION, POWDER, FOR SOLUTION INTRAMUSCULAR; INTRAVENOUS at 17:46

## 2024-11-22 RX ADMIN — DEXMEDETOMIDINE 4 MCG: 100 INJECTION, SOLUTION, CONCENTRATE INTRAVENOUS at 10:18

## 2024-11-22 RX ADMIN — PANTOPRAZOLE SODIUM 40 MG: 40 TABLET, DELAYED RELEASE ORAL at 17:54

## 2024-11-22 RX ADMIN — LIDOCAINE HYDROCHLORIDE 60 MG: 20 INJECTION, SOLUTION EPIDURAL; INFILTRATION; INTRACAUDAL; PERINEURAL at 10:25

## 2024-11-22 ASSESSMENT — PAIN SCALES - GENERAL
PAINLEVEL_OUTOF10: 7
PAINLEVEL_OUTOF10: 0
PAINLEVEL_OUTOF10: 0
PAINLEVEL_OUTOF10: 4
PAINLEVEL_OUTOF10: 4
PAINLEVEL_OUTOF10: 5

## 2024-11-22 ASSESSMENT — PAIN - FUNCTIONAL ASSESSMENT
PAIN_FUNCTIONAL_ASSESSMENT: 0-10
PAIN_FUNCTIONAL_ASSESSMENT: NONE - DENIES PAIN
PAIN_FUNCTIONAL_ASSESSMENT: 0-10
PAIN_FUNCTIONAL_ASSESSMENT: 0-10

## 2024-11-22 ASSESSMENT — LIFESTYLE VARIABLES
HOW MANY STANDARD DRINKS CONTAINING ALCOHOL DO YOU HAVE ON A TYPICAL DAY: 1 OR 2
SMOKING_STATUS: 1
HOW OFTEN DO YOU HAVE A DRINK CONTAINING ALCOHOL: MONTHLY OR LESS

## 2024-11-22 ASSESSMENT — PAIN DESCRIPTION - LOCATION
LOCATION: THROAT;HIP
LOCATION: HIP

## 2024-11-22 ASSESSMENT — PAIN DESCRIPTION - DESCRIPTORS: DESCRIPTORS: ACHING

## 2024-11-22 ASSESSMENT — PAIN DESCRIPTION - ORIENTATION: ORIENTATION: RIGHT

## 2024-11-22 NOTE — OP NOTE
exposed by placing a femoral elevator underneath the femoral neck to protect the posterior soft tissues. Cortical bone from the lateral base of the femoral neck was removed with a rongeur and box osteotome. The canal was cannulated with canal finder and prepared with lateralizer. This was followed by sequential broaching carefully matching femoral anteversion until identifying adequate fit and fill of proximal femur, with stability of final broach to rotational and axial force achieved. Calcar planer was used to finalize preparation of femoral neck cut.      The final broach was left in place and a bipolar trial head was placed on the standard trial neck. The joint was reduced and the hip was found to be stable in extension and external rotation, in sleeper's position, and with hip flexion and 45 degrees of internal rotation. Leg lengths appeared to match reasonably well when palpating bone landmarks on the down leg through the drapes. The hip was dislocated and trials were removed. The femoral canal was cleaned using a pulsatile lavage brush.      The final femoral stem was then placed into the femoral canal taking care to maintain appropriate anteversion. The acetabulum was then irrigated and confirmed to be free of debris. The stem trunion was cleaned with a dry lap prior to impacting the femoral head implant onto the stem. The hip was reduced and again found to be stable through range of motion.      Braided non-absorbable suture used to repair the posterior capsule obtaining good approximation. The piriformis and short external rotators were repaired to greater trochanter through bone tunnels using previously placed braided non-absorbable suture. The fascia was closed with #0 stratofix. The deep layer was re-approximated with #0 vicryl followed by interrupted #2-0 vicryl for deep dermis. The skin was closed with staples. A sterile dressing consisting of xeroform, 4x4s, and tegaderm was applied prior to removal

## 2024-11-22 NOTE — INTERVAL H&P NOTE
Update History & Physical    History and Physical was reviewed and I examined the patient.   There was no change. The surgical site was confirmed by the patient and me.     Procedure: Right hip hemiarthroplasty    Plan: The risks, benefits, expected outcome, and alternative to the recommended procedure have been discussed with the patient per op note. Patient and/or family understands and wants to proceed.

## 2024-11-22 NOTE — DISCHARGE INSTRUCTIONS
[facility]        Patient Discharge Instructions    Brendon Tena / [id number] : 1945    Admitted 2024 Discharged: 2024     IF YOU HAVE ANY PROBLEMS ONCE YOU ARE AT HOME CALL THE FOLLOWING NUMBERS:   Main office number: [phone] ask for Tiny (medical assistants with Orthopedic Trauma)   Office Address: [address] [place] [address]  [place] [address]        Weight bearing status: As tolerated with walker and assistance        Diet  Resume regular diet  If experiencing nausea and vomiting, call your doctor.       Medications    The medications you are to continue on are listed on the medication reconciliation sheet.   Narcotic pain medications as well as supplemental iron can cause constipation. If this occurs try stopping the narcotic pain medication and/or the iron.   It is important that you take the medication exactly as they are prescribed.  Keep your medication in the bottles provided by the pharmacist and keep a list of the medication names, dosages, and times to be taken in your wallet.   Do not take other medications without consulting your doctor.       Other Important Information    Do NOT smoke as this will greatly increase your risk of infection!    Do not drive and operate hazardous machinery until being cleared to do so by your doctor     Swelling and warmth is normal for 6 months after surgery. If you experience swelling in your leg elevate you leg while laying down with your toes above your heart. If you have sudden onset severe swelling with leg pain call our office.     The stitches deep inside take approximately 6 months to dissolve. There will be sharp shooting, stinging and burning pain. This is normal and will resolve between 3-6 months after surgery.     Difficulty sleeping is normal following surgery. You may try melatonin, an over-the-counter sleep aid or benadryl to help with sleep. Most patients will resume sleeping through the night 5-8 weeks

## 2024-11-22 NOTE — ANESTHESIA PROCEDURE NOTES
Airway  Date/Time: 11/22/2024 10:27 AM  Urgency: elective    Airway not difficult    General Information and Staff    Patient location during procedure: OR  Resident/CRNA: Ivanna Fournier APRN - CRNA  Performed: resident/CRNA   Performed by: Ivanna Fournier APRN - CRNA  Authorized by: Peyman Cheatham MD      Indications and Patient Condition  Indications for airway management: anesthesia  Spontaneous Ventilation: absent  Sedation level: deep  Preoxygenated: yes  Patient position: sniffing  MILS not maintained throughout  Mask difficulty assessment: vent by bag mask    Final Airway Details  Final airway type: endotracheal airway      Successful airway: ETT  Cuffed: yes   Successful intubation technique: direct laryngoscopy  Facilitating devices/methods: intubating stylet  Endotracheal tube insertion site: oral  Blade: Bradley  Blade size: #3  ETT size (mm): 7.0  Cormack-Lehane Classification: grade I - full view of glottis  Placement verified by: chest auscultation and capnometry   Measured from: lips  ETT to lips (cm): 24  Number of attempts at approach: 1  Ventilation between attempts: bag mask  Number of other approaches attempted: 0    no

## 2024-11-22 NOTE — ED PROVIDER NOTES
[facility]  Emergency Department    DISPOSITION Decision To Admit 11/22/2024 04:34:04 AM     ICD-10-CM    1. Closed fracture of neck of right femur, initial encounter ([facility])  S72.001A       2. Fall, initial encounter  W19.XXXA         New Prescriptions    No medications on file     ED Course     ED Course as of 11/22/24 0440   Fri Nov 22, 2024   0312 79-year-old male history of CAD, HTN presents with right hip pain following fall.  Mild hypertension, otherwise vitals reassuring.  Neurovascularly intact.  Will obtain x-ray imaging.  Offered pain medication with patient declined [ER]   0436 X-ray imaging shows right femoral neck fracture.  Discussed with hospitalist regarding admission. [ER]      ED Course User Index  [ER] Bebo Walker MD     Data Reviewed and Analyzed:  1 acute complicated illness or injury.    I independently ordered and reviewed each unique test.   I interpreted the X-rays femoral neck fracture.  The patient was admitted and I have discussed patient management with the admitting provider.       HPI   Brendon Tena is a 79 y.o. male with a history of CAD, CKD, HTN who presents to the ED with complaint of fall and hip pain.  Patient states he fell asleep on the couch watching TV this evening.  When he awoke his right leg had fallen asleep.  When he went to stand up he felt unsteady and his right leg gave out on him and caused him to fall and land on his right side.  Landed with his right hip against the ground.  Did not hit his head or lose consciousness.  States he heard a \"crack \"in the right leg.  Had acute onset of pain to his right hip.  He was able to call EMS to assist with getting off the ground.  Primarily complaining of a \"discomfort \"to the right hip without significant pain.  Denies numbness, tingling, weakness.  No nausea, vomiting, vision changes.  No anticoagulation.  No prior surgeries    History     Past Medical History:   Diagnosis Date    CAD

## 2024-11-22 NOTE — ANESTHESIA POSTPROCEDURE EVALUATION
like that before as far as he is aware. Safe for transfer to floor.  Multimodal analgesia pain management approach  Pain management: adequate and satisfactory to patient    No notable events documented.

## 2024-11-22 NOTE — H&P
prophylaxis: SCD's     Code status: No Order      Non-peripheral Lines and Tubes (if present):             Hospital Problems:  Principal Problem:    Subcapital fracture of femur, right, closed, initial encounter ([facility])  Resolved Problems:    * No resolved hospital problems. *        Objective:   Patient Vitals for the past 24 hrs:   Temp Pulse Resp BP SpO2   11/22/24 0309 97.7 °F (36.5 °C) 66 17 (!) 156/73 98 %       Oxygen Therapy  SpO2: 98 %    Estimated body mass index is 23.92 kg/m² as calculated from the following:    Height as of this encounter: 1.753 m (5' 9\").    Weight as of this encounter: 73.5 kg (162 lb).  No intake or output data in the 24 hours ending 11/22/24 0513      VS:156/73  66  17  97.7  98%  General:    Well nourished.    Head:  Normocephalic, atraumatic  Eyes:  Sclerae appear normal.  Pupils equally round.  ENT:  Nares appear normal.  Moist oral mucosa  Neck:  No restricted ROM.  Trachea midline   CV:   RRR.  No m/r/g.  No jugular venous distension.  Lungs:   CTAB.  No wheezing, rhonchi, or rales.  Symmetric expansion.  Abdomen:   Soft, nontender, nondistended.  Extremities: No cyanosis or clubbing.  No edema  Skin:     No rashes.  Normal coloration.   Warm and dry.    Neuro:  CN II-XII grossly intact.  Sensation intact.   Psych:  Normal mood and affect.      Orders Placed This Encounter   Medications    morphine sulfate (PF) injection 4 mg       Prior to Admit Medications:  Current Outpatient Medications   Medication Instructions    amLODIPine-benazepril (LOTREL) 5-20 MG per capsule 1 capsule, Oral, DAILY    aspirin 81 MG EC tablet Oral, DAILY    atorvastatin (LIPITOR) 80 mg, Oral, DAILY    buPROPion (WELLBUTRIN XL) 150 mg, Oral, EVERY MORNING    lansoprazole (PREVACID) 30 MG delayed release capsule No dose, route, or frequency recorded.    loratadine (CLARITIN) 10 mg, Oral, DAILY    metoprolol succinate (TOPROL XL) 25 mg, Oral, DAILY    montelukast (SINGULAIR) 10 mg, Oral, DAILY

## 2024-11-22 NOTE — PERIOP NOTE
TRANSFER - OUT REPORT:    Verbal report given to Sarah RN on Brendon Tena  being transferred to [facility] for routine post-op       Report consisted of patient’s Situation, Background, Assessment and   Recommendations(SBAR).     Information from the following report(s) Nurse Handoff Report, Adult Overview, Surgery Report, Intake/Output, and MAR was reviewed with the receiving nurse.    Lines:   Peripheral IV 11/22/24 Distal;Right Cephalic (Active)   Site Assessment Clean, dry & intact 11/22/24 1244   Line Status Infusing 11/22/24 1244   Line Care Connections checked and tightened 11/22/24 1244   Phlebitis Assessment No symptoms 11/22/24 1244   Infiltration Assessment 0 11/22/24 1244   Alcohol Cap Used No 11/22/24 1244   Dressing Status Clean, dry & intact 11/22/24 1244   Dressing Type Transparent 11/22/24 1244        Opportunity for questions and clarification was provided.      Patient transported with:   Tech    VTE prophylaxis orders have been written for Brendon Tena.    Patient and family given floor number and nurses name.  Family updated re: pt status after security code verified.

## 2024-11-22 NOTE — ED TRIAGE NOTES
Pt brought in by [facility] from with complaint of right hip sp fall from standing.

## 2024-11-22 NOTE — ANESTHESIA PRE PROCEDURE
\"COVID19\"        Anesthesia Evaluation  Patient summary reviewed  Airway: Mallampati: I  TM distance: >3 FB   Neck ROM: full     Dental: normal exam         Pulmonary:normal exam  breath sounds clear to auscultation  (+)           current smoker (Former Smoker)                           Cardiovascular:  Exercise tolerance: good (>4 METS)  (+) hypertension:, CAD:, CABG/stent (Stents x 2):, hyperlipidemia        Rhythm: regular  Rate: normal                    Neuro/Psych:   (+) depression/anxiety             GI/Hepatic/Renal:   (+) GERD: well controlled, renal disease (Stage 3): CRI          Endo/Other: Negative Endo/Other ROS                    Abdominal:              PE comment: Deferred   Vascular: negative vascular ROS.         Other Findings:       Anesthesia Plan      general     ASA 3     (Had 81mg ASA, Metoprolol and Amlodipine today. Appropriately NPO.)  Induction: intravenous.    MIPS: Prophylactic antiemetics administered.  Anesthetic plan and risks discussed with patient.                    Peyman Cheatham MD   11/22/2024

## 2024-11-22 NOTE — H&P (VIEW-ONLY)
Orthopaedic Trauma Service  Consultation Note    Patient ID:  Brendon Tena  79 y.o.  male  1945   [id number]    Presenting/Chief Complaint: Fall and Hip Pain    Date of Admission: 11/22/2024  3:07 AM   Reason(s) for Admission:   Subcapital fracture of femur, right, closed, initial encounter ([facility]) [S72.011A]     Date of Consultation: November 22, 2024  Referring Physician: Agusto Diaz,*    Hospital Problems:  Principal Problem:    Subcapital fracture of femur, right, closed, initial encounter ([facility])  Resolved Problems:    * No resolved hospital problems. *    Assessment & Plan     Reviewed imaging w/ pt and family  Will require right hip hemiarthroplasty  - Discussed risks/benefits of surgery. See op note.    Will proceed w/ surgery today, 11/22    Pt w/ concern for Osteopenia   -Will order Vit D  -Recommend outpatient DEXA scan following discharge for assessment of bone mineral density    Multimodal pain control  WB status: Nonweightbearing right lower extremity  PT/OT on hold for OR  Diet:  Diet NPO Exceptions are: Ice Chips, Sips of Water with Meds  DVT prophylaxis: Hold for OR    History of Present Illness   Brendon Tena is a 79 y.o. male who presented to emergency department by [facility] with Right hip pain s/p mechanical fall  -Sig PMH: Aortic ectasia, CAD, HTN, CKD3, GERD, MGUS  -Sig PSH:    Radiographs in emergency department demonstrate displaced right subcapital femoral neck fracture  Patient was admitted to the hospitalist service  Orthopedics consulted regarding further management    Denies altered motor/sensation to extremities.  Denies headache, fevers/chills, nausea/vomiting, chest pain, shortness of breath.    History     Past Medical History:   Diagnosis Date    CAD (coronary artery disease)     Dr Gunter x 2    CKD (chronic kidney disease)     Depression     Esophageal ring     s/p dilation Nov 2020    GERD (gastroesophageal reflux disease)     HLD (hyperlipidemia)

## 2024-11-23 ENCOUNTER — APPOINTMENT (OUTPATIENT)
Dept: NON INVASIVE DIAGNOSTICS | Age: 79
DRG: 522 | End: 2024-11-23
Attending: STUDENT IN AN ORGANIZED HEALTH CARE EDUCATION/TRAINING PROGRAM
Payer: MEDICARE

## 2024-11-23 PROBLEM — I47.10 SVT (SUPRAVENTRICULAR TACHYCARDIA) (HCC): Status: ACTIVE | Noted: 2024-11-23

## 2024-11-23 LAB
ANION GAP SERPL CALC-SCNC: 9 MMOL/L (ref 7–16)
BASOPHILS # BLD: 0 K/UL (ref 0–0.2)
BASOPHILS NFR BLD: 0 % (ref 0–2)
BUN SERPL-MCNC: 15 MG/DL (ref 8–23)
CALCIUM SERPL-MCNC: 8.9 MG/DL (ref 8.8–10.2)
CHLORIDE SERPL-SCNC: 99 MMOL/L (ref 98–107)
CO2 SERPL-SCNC: 26 MMOL/L (ref 20–29)
CREAT SERPL-MCNC: 1.2 MG/DL (ref 0.8–1.3)
DIFFERENTIAL METHOD BLD: ABNORMAL
ECHO AO ASC DIAM: 3.4 CM
ECHO AO ASCENDING AORTA INDEX: 1.8 CM/M2
ECHO AO ROOT DIAM: 3.6 CM
ECHO AO ROOT INDEX: 1.9 CM/M2
ECHO AV AREA PEAK VELOCITY: 2.9 CM2
ECHO AV AREA VTI: 2.9 CM2
ECHO AV AREA/BSA PEAK VELOCITY: 1.5 CM2/M2
ECHO AV AREA/BSA VTI: 1.5 CM2/M2
ECHO AV MEAN GRADIENT: 4 MMHG
ECHO AV MEAN VELOCITY: 1 M/S
ECHO AV PEAK GRADIENT: 8 MMHG
ECHO AV PEAK VELOCITY: 1.4 M/S
ECHO AV VELOCITY RATIO: 0.93
ECHO AV VTI: 25.6 CM
ECHO BSA: 1.89 M2
ECHO EST RA PRESSURE: 3 MMHG
ECHO IVC PROX: 1.7 CM
ECHO LA AREA 2C: 11.4 CM2
ECHO LA AREA 4C: 14.8 CM2
ECHO LA DIAMETER INDEX: 1.59 CM/M2
ECHO LA DIAMETER: 3 CM
ECHO LA MAJOR AXIS: 4.9 CM
ECHO LA MINOR AXIS: 4.7 CM
ECHO LA TO AORTIC ROOT RATIO: 0.83
ECHO LA VOL BP: 29 ML (ref 18–58)
ECHO LA VOL MOD A2C: 24 ML (ref 18–58)
ECHO LA VOL MOD A4C: 34 ML (ref 18–58)
ECHO LA VOL/BSA BIPLANE: 15 ML/M2 (ref 16–34)
ECHO LA VOLUME INDEX MOD A2C: 13 ML/M2 (ref 16–34)
ECHO LA VOLUME INDEX MOD A4C: 18 ML/M2 (ref 16–34)
ECHO LV E' LATERAL VELOCITY: 14.9 CM/S
ECHO LV E' SEPTAL VELOCITY: 9.9 CM/S
ECHO LV EDV A2C: 95 ML
ECHO LV EDV A4C: 115 ML
ECHO LV EDV INDEX A4C: 61 ML/M2
ECHO LV EDV NDEX A2C: 50 ML/M2
ECHO LV EJECTION FRACTION A2C: 70 %
ECHO LV EJECTION FRACTION A4C: 74 %
ECHO LV EJECTION FRACTION BIPLANE: 68 % (ref 55–100)
ECHO LV ESV A2C: 28 ML
ECHO LV ESV A4C: 30 ML
ECHO LV ESV INDEX A2C: 15 ML/M2
ECHO LV ESV INDEX A4C: 16 ML/M2
ECHO LV FRACTIONAL SHORTENING: 38 % (ref 28–44)
ECHO LV INTERNAL DIMENSION DIASTOLE INDEX: 2.49 CM/M2
ECHO LV INTERNAL DIMENSION DIASTOLIC: 4.7 CM (ref 4.2–5.9)
ECHO LV INTERNAL DIMENSION SYSTOLIC INDEX: 1.53 CM/M2
ECHO LV INTERNAL DIMENSION SYSTOLIC: 2.9 CM
ECHO LV IVSD: 1.1 CM (ref 0.6–1)
ECHO LV MASS 2D: 164.5 G (ref 88–224)
ECHO LV MASS INDEX 2D: 87 G/M2 (ref 49–115)
ECHO LV POSTERIOR WALL DIASTOLIC: 0.9 CM (ref 0.6–1)
ECHO LV RELATIVE WALL THICKNESS RATIO: 0.38
ECHO LVOT AREA: 3.1 CM2
ECHO LVOT AV VTI INDEX: 0.91
ECHO LVOT DIAM: 2 CM
ECHO LVOT MEAN GRADIENT: 3 MMHG
ECHO LVOT PEAK GRADIENT: 6 MMHG
ECHO LVOT PEAK VELOCITY: 1.3 M/S
ECHO LVOT STROKE VOLUME INDEX: 38.7 ML/M2
ECHO LVOT SV: 73.2 ML
ECHO LVOT VTI: 23.3 CM
ECHO MV A VELOCITY: 1.18 M/S
ECHO MV E DECELERATION TIME (DT): 227 MS
ECHO MV E VELOCITY: 0.89 M/S
ECHO MV E/A RATIO: 0.75
ECHO MV E/E' LATERAL: 5.97
ECHO MV E/E' RATIO (AVERAGED): 7.48
ECHO MV E/E' SEPTAL: 8.99
ECHO PV ACCELERATION TIME (AT): 140 MS
ECHO PV MAX VELOCITY: 1.1 M/S
ECHO PV PEAK GRADIENT: 5 MMHG
ECHO RV BASAL DIMENSION: 3 CM
ECHO RV FREE WALL PEAK S': 20.2 CM/S
ECHO RV TAPSE: 1.8 CM (ref 1.7–?)
EOSINOPHIL # BLD: 0 K/UL (ref 0–0.8)
EOSINOPHIL NFR BLD: 0 % (ref 0.5–7.8)
ERYTHROCYTE [DISTWIDTH] IN BLOOD BY AUTOMATED COUNT: 13.2 % (ref 11.9–14.6)
GLUCOSE SERPL-MCNC: 116 MG/DL (ref 70–99)
HCT VFR BLD AUTO: 34.1 % (ref 41.1–50.3)
HGB BLD-MCNC: 11.1 G/DL (ref 13.6–17.2)
IMM GRANULOCYTES # BLD AUTO: 0.1 K/UL (ref 0–0.5)
IMM GRANULOCYTES NFR BLD AUTO: 0 % (ref 0–5)
LYMPHOCYTES # BLD: 1.1 K/UL (ref 0.5–4.6)
LYMPHOCYTES NFR BLD: 7 % (ref 13–44)
MAGNESIUM SERPL-MCNC: 1.7 MG/DL (ref 1.8–2.4)
MCH RBC QN AUTO: 31 PG (ref 26.1–32.9)
MCHC RBC AUTO-ENTMCNC: 32.6 G/DL (ref 31.4–35)
MCV RBC AUTO: 95.3 FL (ref 82–102)
MONOCYTES # BLD: 2 K/UL (ref 0.1–1.3)
MONOCYTES NFR BLD: 14 % (ref 4–12)
NEUTS SEG # BLD: 11.4 K/UL (ref 1.7–8.2)
NEUTS SEG NFR BLD: 79 % (ref 43–78)
NRBC # BLD: 0 K/UL (ref 0–0.2)
PLATELET # BLD AUTO: 202 K/UL (ref 150–450)
PMV BLD AUTO: 11.5 FL (ref 9.4–12.3)
POTASSIUM SERPL-SCNC: 4.7 MMOL/L (ref 3.5–5.1)
RBC # BLD AUTO: 3.58 M/UL (ref 4.23–5.6)
SODIUM SERPL-SCNC: 134 MMOL/L (ref 136–145)
WBC # BLD AUTO: 14.5 K/UL (ref 4.3–11.1)

## 2024-11-23 PROCEDURE — 6370000000 HC RX 637 (ALT 250 FOR IP): Performed by: ORTHOPAEDIC SURGERY

## 2024-11-23 PROCEDURE — 85025 COMPLETE CBC W/AUTO DIFF WBC: CPT

## 2024-11-23 PROCEDURE — C8929 TTE W OR WO FOL WCON,DOPPLER: HCPCS

## 2024-11-23 PROCEDURE — 97112 NEUROMUSCULAR REEDUCATION: CPT

## 2024-11-23 PROCEDURE — 36415 COLL VENOUS BLD VENIPUNCTURE: CPT

## 2024-11-23 PROCEDURE — 1100000003 HC PRIVATE W/ TELEMETRY

## 2024-11-23 PROCEDURE — 2580000003 HC RX 258: Performed by: STUDENT IN AN ORGANIZED HEALTH CARE EDUCATION/TRAINING PROGRAM

## 2024-11-23 PROCEDURE — 83735 ASSAY OF MAGNESIUM: CPT

## 2024-11-23 PROCEDURE — 93306 TTE W/DOPPLER COMPLETE: CPT | Performed by: INTERNAL MEDICINE

## 2024-11-23 PROCEDURE — 6360000004 HC RX CONTRAST MEDICATION: Performed by: STUDENT IN AN ORGANIZED HEALTH CARE EDUCATION/TRAINING PROGRAM

## 2024-11-23 PROCEDURE — 6360000002 HC RX W HCPCS: Performed by: ORTHOPAEDIC SURGERY

## 2024-11-23 PROCEDURE — 2580000003 HC RX 258: Performed by: ORTHOPAEDIC SURGERY

## 2024-11-23 PROCEDURE — 97161 PT EVAL LOW COMPLEX 20 MIN: CPT

## 2024-11-23 PROCEDURE — 97530 THERAPEUTIC ACTIVITIES: CPT

## 2024-11-23 PROCEDURE — 97165 OT EVAL LOW COMPLEX 30 MIN: CPT

## 2024-11-23 PROCEDURE — 80048 BASIC METABOLIC PNL TOTAL CA: CPT

## 2024-11-23 RX ADMIN — VITAMIN D, TAB 1000IU (100/BT) 1000 UNITS: 25 TAB at 08:45

## 2024-11-23 RX ADMIN — MONTELUKAST 10 MG: 10 TABLET, FILM COATED ORAL at 08:45

## 2024-11-23 RX ADMIN — BUPROPION HYDROCHLORIDE 150 MG: 150 TABLET, EXTENDED RELEASE ORAL at 08:45

## 2024-11-23 RX ADMIN — SODIUM CHLORIDE, PRESERVATIVE FREE 0.15 ML: 5 INJECTION INTRAVENOUS at 10:45

## 2024-11-23 RX ADMIN — SODIUM CHLORIDE, PRESERVATIVE FREE 10 ML: 5 INJECTION INTRAVENOUS at 21:32

## 2024-11-23 RX ADMIN — BISACODYL 5 MG: 5 TABLET, COATED ORAL at 08:45

## 2024-11-23 RX ADMIN — CETIRIZINE HYDROCHLORIDE 10 MG: 10 TABLET, FILM COATED ORAL at 08:46

## 2024-11-23 RX ADMIN — ONDANSETRON 4 MG: 4 TABLET, ORALLY DISINTEGRATING ORAL at 08:49

## 2024-11-23 RX ADMIN — ASPIRIN 325 MG: 325 TABLET, COATED ORAL at 08:45

## 2024-11-23 RX ADMIN — CEFAZOLIN 2000 MG: 2 INJECTION, POWDER, FOR SOLUTION INTRAMUSCULAR; INTRAVENOUS at 04:21

## 2024-11-23 RX ADMIN — POLYETHYLENE GLYCOL 3350 17 G: 17 POWDER, FOR SOLUTION ORAL at 21:32

## 2024-11-23 RX ADMIN — ATORVASTATIN CALCIUM 80 MG: 80 TABLET, FILM COATED ORAL at 21:32

## 2024-11-23 RX ADMIN — PANTOPRAZOLE SODIUM 40 MG: 40 TABLET, DELAYED RELEASE ORAL at 06:29

## 2024-11-23 ASSESSMENT — PAIN SCALES - GENERAL
PAINLEVEL_OUTOF10: 5
PAINLEVEL_OUTOF10: 0
PAINLEVEL_OUTOF10: 0

## 2024-11-23 ASSESSMENT — PAIN DESCRIPTION - ORIENTATION: ORIENTATION: RIGHT

## 2024-11-23 ASSESSMENT — PAIN DESCRIPTION - LOCATION: LOCATION: HIP

## 2024-11-23 NOTE — THERAPY EVALUATION
shower seat at home     ASSESSMENT:  Mr. Tena is a 78 y/o male who presents s/p R hip hemiarthroplasty.     Today, pt is received supine in bed, agreeable to participate in the session. Per patient report, he lives alone in a 2nd floor apartment with 14 steps to enter. He is typically independent with all ADLs/IADLs without assistive device for mobility at baseline. Reports 1 fall within the past six months, ultimately resulting in current admission. OT educated patient on hip precautions, weight bearing restrictions, walker management and safety, fall prevention strategies , and proper body mechanics. Patient will need continued education at next session. Pt performed bed mobility transfers/supine to sit EOB with modA x2 and additional time/cues for proper technique and sequencing. Pt presented with fair(+) sitting balance EOB requiring SBA for safety. Pt then performed sit<>stand transfers, bed to chair transfer, and short distance functional mobility for ADLs using rolling walker with Watson x2, additional time, and chair follow. Pt required min verbal, visual, and tactile cues for safety awareness, walker management, hand placement, upright posture, and sequencing of all functional transfers. While standing edge of sink, pt began endorsing symptoms of dizziness and requested seated rest break. BP assessed and read 79/46. Patient legs reclined and BP reassessed, read 82/46 (RN notified). Symptoms of dizziness resolved with reclined sitting. Further mobility deferred at this time. Pt positioned comfortably sitting up in chair at end of session with alarm on and all needs within reach. RN and PCT notified of pt's performance.    Pt presents as functioning below his baseline with overall deficits in ADL performance, functional transfers, household mobility for ADLs, strength, balance, and activity tolerance. Pt will benefit from skilled OT services at this time in order to address functional deficits and OT

## 2024-11-23 NOTE — CARE COORDINATION
Admitted for femur fracture and surgical repair performed 11/22. Assessment completed with patient at bedside. He lives alone at [address], [address] [place] [address]. There are 14 steps to enter building with no elevator available. Apartment itself is one level. Patient reports in the past, he has gone to his sister's home to recover in [place] when stairs were an issue. He reports being IPTA and using no DME. No previous HH or SNF. Discussed potential DC recommendations such as SNF, IRF, HH. He is most interested in IRF.    Addendum: PT/OT notes are in system and both are recommending IRF. Referral sent to [facility]. Bed offer pending, will need [employer] auth if accepted.        11/23/24 1407   Service Assessment   Patient Orientation Alert and Oriented;Person;Place;Situation   Cognition Alert   History Provided By Patient   Primary Caregiver Self   Accompanied By/Relationship NA   Support Systems Family Members   Patient's Healthcare Decision Maker is: Legal Next of Kin  (Has three children, states they would make decisions for him)   PCP Verified by CM Yes   Prior Functional Level Independent in ADLs/IADLs   Current Functional Level Independent in ADLs/IADLs   Can patient return to prior living arrangement Other (see comment)  (PT/OT recommending rehab)   Ability to make needs known: Good   Family able to assist with home care needs: Yes   Would you like for me to discuss the discharge plan with any other family members/significant others, and if so, who? No   Financial Resources Medicare   Community Resources None   CM/SW Referral Other (see comment)  (discharge planning)   Social/Functional History   Lives With Alone   Type of Home Apartment   Home Layout One level  (with 14 steps to enter)   Home Access Stairs to enter with rails   Entrance Stairs - Number of Steps 14   Receives Help From Family   ADL Assistance Independent   Homemaking Assistance Independent   Homemaking Responsibilities Yes

## 2024-11-24 LAB
25(OH)D3 SERPL-MCNC: 27.9 NG/ML (ref 30–100)
ANION GAP SERPL CALC-SCNC: 10 MMOL/L (ref 7–16)
BASOPHILS # BLD: 0 K/UL (ref 0–0.2)
BASOPHILS NFR BLD: 0 % (ref 0–2)
BUN SERPL-MCNC: 23 MG/DL (ref 8–23)
CALCIUM SERPL-MCNC: 8.3 MG/DL (ref 8.8–10.2)
CHLORIDE SERPL-SCNC: 96 MMOL/L (ref 98–107)
CO2 SERPL-SCNC: 26 MMOL/L (ref 20–29)
CREAT SERPL-MCNC: 1.64 MG/DL (ref 0.8–1.3)
DIFFERENTIAL METHOD BLD: ABNORMAL
EKG DIAGNOSIS: NORMAL
EKG Q-T INTERVAL: 324 MS
EKG QRS DURATION: 78 MS
EKG QTC CALCULATION (BAZETT): 448 MS
EKG R AXIS: -9 DEGREES
EKG T AXIS: 0 DEGREES
EKG VENTRICULAR RATE: 115 BPM
EOSINOPHIL # BLD: 0.1 K/UL (ref 0–0.8)
EOSINOPHIL NFR BLD: 1 % (ref 0.5–7.8)
ERYTHROCYTE [DISTWIDTH] IN BLOOD BY AUTOMATED COUNT: 13.3 % (ref 11.9–14.6)
GLUCOSE SERPL-MCNC: 122 MG/DL (ref 70–99)
HCT VFR BLD AUTO: 31.7 % (ref 41.1–50.3)
HGB BLD-MCNC: 10.5 G/DL (ref 13.6–17.2)
IMM GRANULOCYTES # BLD AUTO: 0.1 K/UL (ref 0–0.5)
IMM GRANULOCYTES NFR BLD AUTO: 1 % (ref 0–5)
LYMPHOCYTES # BLD: 2.1 K/UL (ref 0.5–4.6)
LYMPHOCYTES NFR BLD: 12 % (ref 13–44)
MAGNESIUM SERPL-MCNC: 1.8 MG/DL (ref 1.8–2.4)
MCH RBC QN AUTO: 31 PG (ref 26.1–32.9)
MCHC RBC AUTO-ENTMCNC: 33.1 G/DL (ref 31.4–35)
MCV RBC AUTO: 93.5 FL (ref 82–102)
MONOCYTES # BLD: 2.7 K/UL (ref 0.1–1.3)
MONOCYTES NFR BLD: 15 % (ref 4–12)
NEUTS SEG # BLD: 13.1 K/UL (ref 1.7–8.2)
NEUTS SEG NFR BLD: 72 % (ref 43–78)
NRBC # BLD: 0 K/UL (ref 0–0.2)
PLATELET # BLD AUTO: 182 K/UL (ref 150–450)
PMV BLD AUTO: 11.2 FL (ref 9.4–12.3)
POTASSIUM SERPL-SCNC: 4.6 MMOL/L (ref 3.5–5.1)
RBC # BLD AUTO: 3.39 M/UL (ref 4.23–5.6)
SODIUM SERPL-SCNC: 132 MMOL/L (ref 136–145)
WBC # BLD AUTO: 18.1 K/UL (ref 4.3–11.1)

## 2024-11-24 PROCEDURE — 51798 US URINE CAPACITY MEASURE: CPT

## 2024-11-24 PROCEDURE — 51701 INSERT BLADDER CATHETER: CPT

## 2024-11-24 PROCEDURE — 2580000003 HC RX 258: Performed by: ORTHOPAEDIC SURGERY

## 2024-11-24 PROCEDURE — 83735 ASSAY OF MAGNESIUM: CPT

## 2024-11-24 PROCEDURE — 97530 THERAPEUTIC ACTIVITIES: CPT

## 2024-11-24 PROCEDURE — 6370000000 HC RX 637 (ALT 250 FOR IP): Performed by: INTERNAL MEDICINE

## 2024-11-24 PROCEDURE — 82306 VITAMIN D 25 HYDROXY: CPT

## 2024-11-24 PROCEDURE — 93010 ELECTROCARDIOGRAM REPORT: CPT | Performed by: INTERNAL MEDICINE

## 2024-11-24 PROCEDURE — 6370000000 HC RX 637 (ALT 250 FOR IP): Performed by: ORTHOPAEDIC SURGERY

## 2024-11-24 PROCEDURE — 93005 ELECTROCARDIOGRAM TRACING: CPT | Performed by: HOSPITALIST

## 2024-11-24 PROCEDURE — 36415 COLL VENOUS BLD VENIPUNCTURE: CPT

## 2024-11-24 PROCEDURE — 2060000000 HC ICU INTERMEDIATE R&B

## 2024-11-24 PROCEDURE — 2500000003 HC RX 250 WO HCPCS: Performed by: CASE MANAGER/CARE COORDINATOR

## 2024-11-24 PROCEDURE — 80048 BASIC METABOLIC PNL TOTAL CA: CPT

## 2024-11-24 PROCEDURE — 85025 COMPLETE CBC W/AUTO DIFF WBC: CPT

## 2024-11-24 PROCEDURE — 6360000002 HC RX W HCPCS: Performed by: CASE MANAGER/CARE COORDINATOR

## 2024-11-24 PROCEDURE — 6370000000 HC RX 637 (ALT 250 FOR IP): Performed by: STUDENT IN AN ORGANIZED HEALTH CARE EDUCATION/TRAINING PROGRAM

## 2024-11-24 RX ORDER — MAGNESIUM SULFATE IN WATER 40 MG/ML
2000 INJECTION, SOLUTION INTRAVENOUS ONCE
Status: COMPLETED | OUTPATIENT
Start: 2024-11-24 | End: 2024-11-24

## 2024-11-24 RX ORDER — DILTIAZEM HYDROCHLORIDE 5 MG/ML
5 INJECTION INTRAVENOUS
Status: COMPLETED | OUTPATIENT
Start: 2024-11-24 | End: 2024-11-24

## 2024-11-24 RX ORDER — METOPROLOL SUCCINATE 50 MG/1
50 TABLET, EXTENDED RELEASE ORAL DAILY
Status: DISCONTINUED | OUTPATIENT
Start: 2024-11-25 | End: 2024-11-29 | Stop reason: HOSPADM

## 2024-11-24 RX ADMIN — ATORVASTATIN CALCIUM 80 MG: 80 TABLET, FILM COATED ORAL at 20:10

## 2024-11-24 RX ADMIN — APIXABAN 5 MG: 5 TABLET, FILM COATED ORAL at 20:10

## 2024-11-24 RX ADMIN — VITAMIN D, TAB 1000IU (100/BT) 1000 UNITS: 25 TAB at 08:10

## 2024-11-24 RX ADMIN — SODIUM CHLORIDE, PRESERVATIVE FREE 10 ML: 5 INJECTION INTRAVENOUS at 20:10

## 2024-11-24 RX ADMIN — MAGNESIUM SULFATE HEPTAHYDRATE 2000 MG: 40 INJECTION, SOLUTION INTRAVENOUS at 04:16

## 2024-11-24 RX ADMIN — PANTOPRAZOLE SODIUM 40 MG: 40 TABLET, DELAYED RELEASE ORAL at 04:22

## 2024-11-24 RX ADMIN — DRONEDARONE 400 MG: 400 TABLET, FILM COATED ORAL at 11:12

## 2024-11-24 RX ADMIN — ASPIRIN 325 MG: 325 TABLET, COATED ORAL at 08:10

## 2024-11-24 RX ADMIN — SODIUM CHLORIDE, PRESERVATIVE FREE 5 ML: 5 INJECTION INTRAVENOUS at 08:14

## 2024-11-24 RX ADMIN — CETIRIZINE HYDROCHLORIDE 10 MG: 10 TABLET, FILM COATED ORAL at 08:10

## 2024-11-24 RX ADMIN — BUPROPION HYDROCHLORIDE 150 MG: 150 TABLET, EXTENDED RELEASE ORAL at 08:10

## 2024-11-24 RX ADMIN — BISACODYL 5 MG: 5 TABLET, COATED ORAL at 08:10

## 2024-11-24 RX ADMIN — METOPROLOL SUCCINATE 25 MG: 25 TABLET, EXTENDED RELEASE ORAL at 08:10

## 2024-11-24 RX ADMIN — MONTELUKAST 10 MG: 10 TABLET, FILM COATED ORAL at 08:10

## 2024-11-24 RX ADMIN — DILTIAZEM HYDROCHLORIDE 5 MG: 5 INJECTION, SOLUTION INTRAVENOUS at 03:05

## 2024-11-24 RX ADMIN — DRONEDARONE 400 MG: 400 TABLET, FILM COATED ORAL at 17:20

## 2024-11-24 ASSESSMENT — PAIN SCALES - GENERAL
PAINLEVEL_OUTOF10: 0

## 2024-11-24 NOTE — FLOWSHEET NOTE
4 Eyes Skin Assessment     NAME:  Brendon Tena  YOB: 1945  MEDICAL RECORD NUMBER:  [id number]    The patient is being assessed for  Admission    I agree that at least one RN has performed a thorough Head to Toe Skin Assessment on the patient. ALL assessment sites listed below have been assessed.      Areas assessed by both nurses:    Head, Face, Ears, Shoulders, Back, Chest, Arms, Elbows, Hands, Sacrum. Buttock, Coccyx, Ischium, and Legs. Feet and Heels       11/24/24 1301   Skin Integumentary    Skin Integumentary (WDL) X   Skin Color Ecchymosis (comment);Pale   Skin Condition/Temp Dry;Warm   Skin Integrity Ecchymosis;Incision (see LDA);Scars (comment)   Location R hip Incision; scattered scars and bruising             Does the Patient have a Wound? No noted wound(s)       Kei Prevention initiated by RN: Yes  Wound Care Orders initiated by RN: No    Pressure Injury (Stage 3,4, Unstageable, DTI, NWPT, and Complex wounds) if present, place Wound referral order by RN under : No    New Ostomies, if present place, Ostomy referral order under : No     Nurse 1 eSignature: Electronically signed by ANGELA CARRERA RN on 11/24/24 at 1:02 PM EST    **SHARE this note so that the co-signing nurse can place an eSignature**    Nurse 2 eSignature: {Esignature:[id number]}     Island Pedicle Flap With Canthal Suspension Text: The defect edges were debeveled with a #15 scalpel blade.  Given the location of the defect, shape of the defect and the proximity to free margins an island pedicle advancement flap was deemed most appropriate.  Using a sterile surgical marker, an appropriate advancement flap was drawn incorporating the defect, outlining the appropriate donor tissue and placing the expected incisions within the relaxed skin tension lines where possible. The area thus outlined was incised deep to adipose tissue with a #15 scalpel blade.  The skin margins were undermined to an appropriate distance in all directions around the primary defect and laterally outward around the island pedicle utilizing iris scissors.  There was minimal undermining beneath the pedicle flap. A suspension suture was placed in the canthal tendon to prevent tension and prevent ectropion.

## 2024-11-24 NOTE — MANAGEMENT PLAN
Notified of AF RVR, rate 115 per ECG.  Rhythm does look consistent with atrial fib/flutter RVR.  Magnesium noted to be 1.7 earlier yesterday afternoon--does not appear to be repleted.    Plan:   Order magnesium lab now.  Replete w/ a Magnesium 2g IV bolus.  Diltiazem 5mg IV push now (with consideration of soft BP) per Faxon with hopeful conversion to sinus rhythm.

## 2024-11-25 PROBLEM — Z78.9 DECREASED ACTIVITIES OF DAILY LIVING (ADL): Status: ACTIVE | Noted: 2024-11-25

## 2024-11-25 PROBLEM — R52 ACUTE PAIN: Status: ACTIVE | Noted: 2024-11-25

## 2024-11-25 PROBLEM — R26.9 GAIT ABNORMALITY: Status: ACTIVE | Noted: 2024-11-25

## 2024-11-25 LAB
ANION GAP SERPL CALC-SCNC: 8 MMOL/L (ref 7–16)
BASOPHILS # BLD: 0 K/UL (ref 0–0.2)
BASOPHILS NFR BLD: 0 % (ref 0–2)
BUN SERPL-MCNC: 25 MG/DL (ref 8–23)
CALCIUM SERPL-MCNC: 8.3 MG/DL (ref 8.8–10.2)
CHLORIDE SERPL-SCNC: 96 MMOL/L (ref 98–107)
CO2 SERPL-SCNC: 26 MMOL/L (ref 20–29)
CREAT SERPL-MCNC: 1.54 MG/DL (ref 0.8–1.3)
DIFFERENTIAL METHOD BLD: ABNORMAL
EOSINOPHIL # BLD: 0.1 K/UL (ref 0–0.8)
EOSINOPHIL NFR BLD: 1 % (ref 0.5–7.8)
ERYTHROCYTE [DISTWIDTH] IN BLOOD BY AUTOMATED COUNT: 13.3 % (ref 11.9–14.6)
GLUCOSE SERPL-MCNC: 138 MG/DL (ref 70–99)
HCT VFR BLD AUTO: 30.2 % (ref 41.1–50.3)
HGB BLD-MCNC: 9.9 G/DL (ref 13.6–17.2)
IMM GRANULOCYTES # BLD AUTO: 0.1 K/UL (ref 0–0.5)
IMM GRANULOCYTES NFR BLD AUTO: 1 % (ref 0–5)
LYMPHOCYTES # BLD: 1.7 K/UL (ref 0.5–4.6)
LYMPHOCYTES NFR BLD: 11 % (ref 13–44)
MAGNESIUM SERPL-MCNC: 2.2 MG/DL (ref 1.8–2.4)
MCH RBC QN AUTO: 31 PG (ref 26.1–32.9)
MCHC RBC AUTO-ENTMCNC: 32.8 G/DL (ref 31.4–35)
MCV RBC AUTO: 94.7 FL (ref 82–102)
MONOCYTES # BLD: 2 K/UL (ref 0.1–1.3)
MONOCYTES NFR BLD: 13 % (ref 4–12)
NEUTS SEG # BLD: 11.7 K/UL (ref 1.7–8.2)
NEUTS SEG NFR BLD: 75 % (ref 43–78)
NRBC # BLD: 0 K/UL (ref 0–0.2)
PLATELET # BLD AUTO: 195 K/UL (ref 150–450)
PMV BLD AUTO: 11.8 FL (ref 9.4–12.3)
POTASSIUM SERPL-SCNC: 4.8 MMOL/L (ref 3.5–5.1)
RBC # BLD AUTO: 3.19 M/UL (ref 4.23–5.6)
SODIUM SERPL-SCNC: 130 MMOL/L (ref 136–145)
WBC # BLD AUTO: 15.5 K/UL (ref 4.3–11.1)

## 2024-11-25 PROCEDURE — 6370000000 HC RX 637 (ALT 250 FOR IP): Performed by: STUDENT IN AN ORGANIZED HEALTH CARE EDUCATION/TRAINING PROGRAM

## 2024-11-25 PROCEDURE — 83735 ASSAY OF MAGNESIUM: CPT

## 2024-11-25 PROCEDURE — 85025 COMPLETE CBC W/AUTO DIFF WBC: CPT

## 2024-11-25 PROCEDURE — 99222 1ST HOSP IP/OBS MODERATE 55: CPT | Performed by: STUDENT IN AN ORGANIZED HEALTH CARE EDUCATION/TRAINING PROGRAM

## 2024-11-25 PROCEDURE — 6360000002 HC RX W HCPCS: Performed by: ORTHOPAEDIC SURGERY

## 2024-11-25 PROCEDURE — 97535 SELF CARE MNGMENT TRAINING: CPT

## 2024-11-25 PROCEDURE — 2060000000 HC ICU INTERMEDIATE R&B

## 2024-11-25 PROCEDURE — 6370000000 HC RX 637 (ALT 250 FOR IP): Performed by: ORTHOPAEDIC SURGERY

## 2024-11-25 PROCEDURE — 36415 COLL VENOUS BLD VENIPUNCTURE: CPT

## 2024-11-25 PROCEDURE — 2580000003 HC RX 258: Performed by: NURSE PRACTITIONER

## 2024-11-25 PROCEDURE — 6370000000 HC RX 637 (ALT 250 FOR IP): Performed by: INTERNAL MEDICINE

## 2024-11-25 PROCEDURE — 97530 THERAPEUTIC ACTIVITIES: CPT

## 2024-11-25 PROCEDURE — 80048 BASIC METABOLIC PNL TOTAL CA: CPT

## 2024-11-25 PROCEDURE — 2580000003 HC RX 258: Performed by: ORTHOPAEDIC SURGERY

## 2024-11-25 RX ORDER — METOPROLOL SUCCINATE 25 MG/1
25 TABLET, EXTENDED RELEASE ORAL ONCE
Status: COMPLETED | OUTPATIENT
Start: 2024-11-25 | End: 2024-11-25

## 2024-11-25 RX ORDER — ASPIRIN 81 MG/1
81 TABLET ORAL DAILY
Status: DISCONTINUED | OUTPATIENT
Start: 2024-11-25 | End: 2024-11-29 | Stop reason: HOSPADM

## 2024-11-25 RX ORDER — LISINOPRIL 5 MG/1
10 TABLET ORAL DAILY
Status: DISCONTINUED | OUTPATIENT
Start: 2024-11-25 | End: 2024-11-29 | Stop reason: HOSPADM

## 2024-11-25 RX ORDER — 0.9 % SODIUM CHLORIDE 0.9 %
500 INTRAVENOUS SOLUTION INTRAVENOUS ONCE
Status: COMPLETED | OUTPATIENT
Start: 2024-11-25 | End: 2024-11-26

## 2024-11-25 RX ADMIN — SODIUM CHLORIDE 500 ML: 9 INJECTION, SOLUTION INTRAVENOUS at 22:29

## 2024-11-25 RX ADMIN — CETIRIZINE HYDROCHLORIDE 10 MG: 10 TABLET, FILM COATED ORAL at 08:32

## 2024-11-25 RX ADMIN — APIXABAN 5 MG: 5 TABLET, FILM COATED ORAL at 19:51

## 2024-11-25 RX ADMIN — MONTELUKAST 10 MG: 10 TABLET, FILM COATED ORAL at 08:32

## 2024-11-25 RX ADMIN — VITAMIN D, TAB 1000IU (100/BT) 1000 UNITS: 25 TAB at 08:31

## 2024-11-25 RX ADMIN — APIXABAN 5 MG: 5 TABLET, FILM COATED ORAL at 08:33

## 2024-11-25 RX ADMIN — ATORVASTATIN CALCIUM 80 MG: 80 TABLET, FILM COATED ORAL at 19:51

## 2024-11-25 RX ADMIN — METOPROLOL SUCCINATE 25 MG: 25 TABLET, EXTENDED RELEASE ORAL at 14:10

## 2024-11-25 RX ADMIN — ASPIRIN 81 MG: 81 TABLET, COATED ORAL at 08:32

## 2024-11-25 RX ADMIN — SODIUM CHLORIDE, PRESERVATIVE FREE 20 ML: 5 INJECTION INTRAVENOUS at 19:50

## 2024-11-25 RX ADMIN — BUPROPION HYDROCHLORIDE 150 MG: 150 TABLET, EXTENDED RELEASE ORAL at 08:31

## 2024-11-25 RX ADMIN — BISACODYL 5 MG: 5 TABLET, COATED ORAL at 08:31

## 2024-11-25 RX ADMIN — SODIUM CHLORIDE, PRESERVATIVE FREE 10 ML: 5 INJECTION INTRAVENOUS at 08:39

## 2024-11-25 RX ADMIN — ONDANSETRON 4 MG: 2 INJECTION INTRAMUSCULAR; INTRAVENOUS at 12:11

## 2024-11-25 RX ADMIN — PANTOPRAZOLE SODIUM 40 MG: 40 TABLET, DELAYED RELEASE ORAL at 05:32

## 2024-11-25 ASSESSMENT — PAIN SCALES - GENERAL
PAINLEVEL_OUTOF10: 0

## 2024-11-25 NOTE — CONSULTS
Orthopaedic Trauma Service  Consultation Note    Patient ID:  Brendon Tena  79 y.o.  male  1945   [id number]    Presenting/Chief Complaint: Fall and Hip Pain    Date of Admission: 11/22/2024  3:07 AM   Reason(s) for Admission:   Subcapital fracture of femur, right, closed, initial encounter ([facility]) [S72.011A]     Date of Consultation: November 22, 2024  Referring Physician: Agusto Diaz,*    Hospital Problems:  Principal Problem:    Subcapital fracture of femur, right, closed, initial encounter ([facility])  Resolved Problems:    * No resolved hospital problems. *    Assessment & Plan     Reviewed imaging w/ pt and family  Will require right hip hemiarthroplasty  - Discussed risks/benefits of surgery. See op note.    Will proceed w/ surgery today, 11/22    Pt w/ concern for Osteopenia   -Will order Vit D  -Recommend outpatient DEXA scan following discharge for assessment of bone mineral density    Multimodal pain control  WB status: Nonweightbearing right lower extremity  PT/OT on hold for OR  Diet:  Diet NPO Exceptions are: Ice Chips, Sips of Water with Meds  DVT prophylaxis: Hold for OR    History of Present Illness   Brendon Tena is a 79 y.o. male who presented to emergency department by [facility] with Right hip pain s/p mechanical fall  -Sig PMH: Aortic ectasia, CAD, HTN, CKD3, GERD, MGUS  -Sig PSH:    Radiographs in emergency department demonstrate displaced right subcapital femoral neck fracture  Patient was admitted to the hospitalist service  Orthopedics consulted regarding further management    Denies altered motor/sensation to extremities.  Denies headache, fevers/chills, nausea/vomiting, chest pain, shortness of breath.    History     Past Medical History:   Diagnosis Date    CAD (coronary artery disease)     Dr Gunter x 2    CKD (chronic kidney disease)     Depression     Esophageal ring     s/p dilation Nov 2020    GERD (gastroesophageal reflux disease)     HLD (hyperlipidemia)     
file   Housing Stability: Low Risk  (11/22/2024)    Housing Stability Vital Sign     Unable to Pay for Housing in the Last Year: No     Number of Times Moved in the Last Year: 1     Homeless in the Last Year: No       Current Facility-Administered Medications   Medication Dose Route Frequency    atorvastatin (LIPITOR) tablet 80 mg  80 mg Oral Nightly    buPROPion (WELLBUTRIN XL) extended release tablet 150 mg  150 mg Oral QAM    pantoprazole (PROTONIX) tablet 40 mg  40 mg Oral QAM AC    cetirizine (ZYRTEC) tablet 10 mg  10 mg Oral Daily    metoprolol succinate (TOPROL XL) extended release tablet 25 mg  25 mg Oral Daily    montelukast (SINGULAIR) tablet 10 mg  10 mg Oral Daily    nitroGLYCERIN (NITROSTAT) SL tablet 0.4 mg  0.4 mg SubLINGual Q5 Min PRN    Vitamin D (CHOLECALCIFEROL) tablet 1,000 Units  1,000 Units Oral Daily    sodium chloride flush 0.9 % injection 5-40 mL  5-40 mL IntraVENous 2 times per day    sodium chloride flush 0.9 % injection 5-40 mL  5-40 mL IntraVENous PRN    0.9 % sodium chloride infusion   IntraVENous PRN    potassium chloride (KLOR-CON M) extended release tablet 40 mEq  40 mEq Oral PRN    Or    potassium bicarb-citric acid (EFFER-K) effervescent tablet 40 mEq  40 mEq Oral PRN    Or    potassium chloride 10 mEq/100 mL IVPB (Peripheral Line)  10 mEq IntraVENous PRN    potassium chloride 10 mEq/100 mL IVPB (Peripheral Line)  10 mEq IntraVENous PRN    magnesium sulfate 2000 mg in 50 mL IVPB premix  2,000 mg IntraVENous PRN    ondansetron (ZOFRAN-ODT) disintegrating tablet 4 mg  4 mg Oral Q8H PRN    Or    ondansetron (ZOFRAN) injection 4 mg  4 mg IntraVENous Q6H PRN    polyethylene glycol (GLYCOLAX) packet 17 g  17 g Oral Daily PRN    bisacodyl (DULCOLAX) suppository 10 mg  10 mg Rectal Daily PRN    famotidine (PEPCID) tablet 10 mg  10 mg Oral Daily PRN    aluminum & magnesium hydroxide-simethicone (MAALOX) 200-200-20 MG/5ML suspension 30 mL  30 mL Oral Q6H PRN    morphine (PF) injection 2 mg  
PRN    0.9 % sodium chloride infusion   IntraVENous PRN    potassium chloride (KLOR-CON M) extended release tablet 40 mEq  40 mEq Oral PRN    Or    potassium bicarb-citric acid (EFFER-K) effervescent tablet 40 mEq  40 mEq Oral PRN    Or    potassium chloride 10 mEq/100 mL IVPB (Peripheral Line)  10 mEq IntraVENous PRN    potassium chloride 10 mEq/100 mL IVPB (Peripheral Line)  10 mEq IntraVENous PRN    magnesium sulfate 2000 mg in 50 mL IVPB premix  2,000 mg IntraVENous PRN    ondansetron (ZOFRAN-ODT) disintegrating tablet 4 mg  4 mg Oral Q8H PRN    Or    ondansetron (ZOFRAN) injection 4 mg  4 mg IntraVENous Q6H PRN    polyethylene glycol (GLYCOLAX) packet 17 g  17 g Oral Daily PRN    bisacodyl (DULCOLAX) suppository 10 mg  10 mg Rectal Daily PRN    famotidine (PEPCID) tablet 10 mg  10 mg Oral Daily PRN    aluminum & magnesium hydroxide-simethicone (MAALOX) 200-200-20 MG/5ML suspension 30 mL  30 mL Oral Q6H PRN    morphine (PF) injection 2 mg  2 mg IntraVENous Q4H PRN    bisacodyl (DULCOLAX) EC tablet 5 mg  5 mg Oral Daily    HYDROcodone-acetaminophen (NORCO) 5-325 MG per tablet 1 tablet  1 tablet Oral Q4H PRN    HYDROcodone-acetaminophen (NORCO)  MG per tablet 1 tablet  1 tablet Oral Q6H PRN    phenol 1.4 % mouth spray 1 spray  1 spray Mouth/Throat Q2H PRN    acetaminophen (TYLENOL) tablet 650 mg  650 mg Oral Q4H PRN       Review of Systems:   General: +malaise, fatigue  HEENT: Denies headaches, blurry/double vision, changes in vision.  Cardiovascular: Denies chest pain, palpitations.  Respiratory: Denies dyspnea, orthopnea, wheeze.  Gastrointestinal: Denies abdominal pain, diarrhea, constipation, GERD.  Urological: Denies hematuria, dysuria, urinary frequency.  Musculoskeletal: Denies new myalgias, new arthralgias.  Neurological: Denies lightheadedness, dizziness.  Psychiatric: Denies depression, anxiety.     Objective     Visit Vitals  BP (!) 101/59   Pulse 72   Temp 99 °F (37.2 °C) (Oral)   Resp 17   Ht

## 2024-11-25 NOTE — CARE COORDINATION
Pt being assessed for IRC. Pt has [employer] Medicare, IRC starting auth per Kim. [employer] Medicare typically does not approve that higher LOC. CM explained this to the pt and understood. Provided him a STR List to review. Explained the referral and transfer process, reported understanding. Will wait for [employer] to deny IRC before making referrals to STR.

## 2024-11-26 LAB
ANION GAP SERPL CALC-SCNC: 8 MMOL/L (ref 7–16)
BASOPHILS # BLD: 0 K/UL (ref 0–0.2)
BASOPHILS NFR BLD: 0 % (ref 0–2)
BUN SERPL-MCNC: 29 MG/DL (ref 8–23)
CALCIUM SERPL-MCNC: 8.1 MG/DL (ref 8.8–10.2)
CHLORIDE SERPL-SCNC: 96 MMOL/L (ref 98–107)
CO2 SERPL-SCNC: 26 MMOL/L (ref 20–29)
CREAT SERPL-MCNC: 1.54 MG/DL (ref 0.8–1.3)
DIFFERENTIAL METHOD BLD: ABNORMAL
EOSINOPHIL # BLD: 0.1 K/UL (ref 0–0.8)
EOSINOPHIL NFR BLD: 1 % (ref 0.5–7.8)
ERYTHROCYTE [DISTWIDTH] IN BLOOD BY AUTOMATED COUNT: 13.3 % (ref 11.9–14.6)
GLUCOSE SERPL-MCNC: 110 MG/DL (ref 70–99)
HCT VFR BLD AUTO: 27.9 % (ref 41.1–50.3)
HGB BLD-MCNC: 9.2 G/DL (ref 13.6–17.2)
IMM GRANULOCYTES # BLD AUTO: 0.1 K/UL (ref 0–0.5)
IMM GRANULOCYTES NFR BLD AUTO: 1 % (ref 0–5)
LYMPHOCYTES # BLD: 1.6 K/UL (ref 0.5–4.6)
LYMPHOCYTES NFR BLD: 13 % (ref 13–44)
MAGNESIUM SERPL-MCNC: 2.2 MG/DL (ref 1.8–2.4)
MCH RBC QN AUTO: 30.6 PG (ref 26.1–32.9)
MCHC RBC AUTO-ENTMCNC: 33 G/DL (ref 31.4–35)
MCV RBC AUTO: 92.7 FL (ref 82–102)
MONOCYTES # BLD: 1.5 K/UL (ref 0.1–1.3)
MONOCYTES NFR BLD: 12 % (ref 4–12)
NEUTS SEG # BLD: 9.3 K/UL (ref 1.7–8.2)
NEUTS SEG NFR BLD: 73 % (ref 43–78)
NRBC # BLD: 0 K/UL (ref 0–0.2)
PLATELET # BLD AUTO: 225 K/UL (ref 150–450)
PMV BLD AUTO: 11.1 FL (ref 9.4–12.3)
POTASSIUM SERPL-SCNC: 4.6 MMOL/L (ref 3.5–5.1)
RBC # BLD AUTO: 3.01 M/UL (ref 4.23–5.6)
SODIUM SERPL-SCNC: 130 MMOL/L (ref 136–145)
WBC # BLD AUTO: 12.6 K/UL (ref 4.3–11.1)

## 2024-11-26 PROCEDURE — 97530 THERAPEUTIC ACTIVITIES: CPT

## 2024-11-26 PROCEDURE — 2580000003 HC RX 258: Performed by: ORTHOPAEDIC SURGERY

## 2024-11-26 PROCEDURE — 6370000000 HC RX 637 (ALT 250 FOR IP): Performed by: ORTHOPAEDIC SURGERY

## 2024-11-26 PROCEDURE — 6370000000 HC RX 637 (ALT 250 FOR IP): Performed by: INTERNAL MEDICINE

## 2024-11-26 PROCEDURE — 83735 ASSAY OF MAGNESIUM: CPT

## 2024-11-26 PROCEDURE — 80048 BASIC METABOLIC PNL TOTAL CA: CPT

## 2024-11-26 PROCEDURE — 6370000000 HC RX 637 (ALT 250 FOR IP): Performed by: STUDENT IN AN ORGANIZED HEALTH CARE EDUCATION/TRAINING PROGRAM

## 2024-11-26 PROCEDURE — 6370000000 HC RX 637 (ALT 250 FOR IP)

## 2024-11-26 PROCEDURE — 85025 COMPLETE CBC W/AUTO DIFF WBC: CPT

## 2024-11-26 PROCEDURE — 36415 COLL VENOUS BLD VENIPUNCTURE: CPT

## 2024-11-26 PROCEDURE — 97535 SELF CARE MNGMENT TRAINING: CPT

## 2024-11-26 PROCEDURE — 2060000000 HC ICU INTERMEDIATE R&B

## 2024-11-26 RX ORDER — POLYETHYLENE GLYCOL 3350 17 G/17G
17 POWDER, FOR SOLUTION ORAL ONCE
Status: COMPLETED | OUTPATIENT
Start: 2024-11-26 | End: 2024-11-26

## 2024-11-26 RX ORDER — BISACODYL 5 MG/1
10 TABLET, DELAYED RELEASE ORAL DAILY
Status: DISCONTINUED | OUTPATIENT
Start: 2024-11-27 | End: 2024-11-29 | Stop reason: HOSPADM

## 2024-11-26 RX ADMIN — METOPROLOL SUCCINATE 50 MG: 50 TABLET, EXTENDED RELEASE ORAL at 07:55

## 2024-11-26 RX ADMIN — POLYETHYLENE GLYCOL 3350 17 G: 17 POWDER, FOR SOLUTION ORAL at 11:52

## 2024-11-26 RX ADMIN — MONTELUKAST 10 MG: 10 TABLET, FILM COATED ORAL at 07:55

## 2024-11-26 RX ADMIN — VITAMIN D, TAB 1000IU (100/BT) 1000 UNITS: 25 TAB at 07:55

## 2024-11-26 RX ADMIN — APIXABAN 5 MG: 5 TABLET, FILM COATED ORAL at 07:55

## 2024-11-26 RX ADMIN — CETIRIZINE HYDROCHLORIDE 10 MG: 10 TABLET, FILM COATED ORAL at 07:55

## 2024-11-26 RX ADMIN — SODIUM CHLORIDE, PRESERVATIVE FREE 10 ML: 5 INJECTION INTRAVENOUS at 07:55

## 2024-11-26 RX ADMIN — PANTOPRAZOLE SODIUM 40 MG: 40 TABLET, DELAYED RELEASE ORAL at 04:53

## 2024-11-26 RX ADMIN — SODIUM CHLORIDE, PRESERVATIVE FREE 10 ML: 5 INJECTION INTRAVENOUS at 20:29

## 2024-11-26 RX ADMIN — APIXABAN 5 MG: 5 TABLET, FILM COATED ORAL at 20:28

## 2024-11-26 RX ADMIN — BUPROPION HYDROCHLORIDE 150 MG: 150 TABLET, EXTENDED RELEASE ORAL at 07:55

## 2024-11-26 RX ADMIN — LISINOPRIL 10 MG: 5 TABLET ORAL at 07:55

## 2024-11-26 RX ADMIN — ASPIRIN 81 MG: 81 TABLET, COATED ORAL at 07:55

## 2024-11-26 RX ADMIN — BISACODYL 5 MG: 5 TABLET, COATED ORAL at 07:55

## 2024-11-26 RX ADMIN — ATORVASTATIN CALCIUM 80 MG: 80 TABLET, FILM COATED ORAL at 20:28

## 2024-11-26 ASSESSMENT — PAIN SCALES - GENERAL
PAINLEVEL_OUTOF10: 0

## 2024-11-26 NOTE — CARE COORDINATION
CM following. Writer followed up with Kim at [facility], authorization initiated with [employer]. Once [employer]'s decision is determined, writer will start the process for STR. Pt aware.

## 2024-11-27 PROBLEM — D68.69 SECONDARY HYPERCOAGULABILITY DISORDER (HCC): Status: ACTIVE | Noted: 2024-11-27

## 2024-11-27 LAB
ANION GAP SERPL CALC-SCNC: 8 MMOL/L (ref 7–16)
BASOPHILS # BLD: 0 K/UL (ref 0–0.2)
BASOPHILS NFR BLD: 0 % (ref 0–2)
BUN SERPL-MCNC: 26 MG/DL (ref 8–23)
CALCIUM SERPL-MCNC: 8.5 MG/DL (ref 8.8–10.2)
CHLORIDE SERPL-SCNC: 96 MMOL/L (ref 98–107)
CO2 SERPL-SCNC: 25 MMOL/L (ref 20–29)
CREAT SERPL-MCNC: 1.51 MG/DL (ref 0.8–1.3)
DIFFERENTIAL METHOD BLD: ABNORMAL
EOSINOPHIL # BLD: 0.2 K/UL (ref 0–0.8)
EOSINOPHIL NFR BLD: 2 % (ref 0.5–7.8)
ERYTHROCYTE [DISTWIDTH] IN BLOOD BY AUTOMATED COUNT: 13.3 % (ref 11.9–14.6)
GLUCOSE SERPL-MCNC: 112 MG/DL (ref 70–99)
HCT VFR BLD AUTO: 28.9 % (ref 41.1–50.3)
HGB BLD-MCNC: 9.6 G/DL (ref 13.6–17.2)
IMM GRANULOCYTES # BLD AUTO: 0.1 K/UL (ref 0–0.5)
IMM GRANULOCYTES NFR BLD AUTO: 1 % (ref 0–5)
LYMPHOCYTES # BLD: 1.5 K/UL (ref 0.5–4.6)
LYMPHOCYTES NFR BLD: 13 % (ref 13–44)
MAGNESIUM SERPL-MCNC: 2.4 MG/DL (ref 1.8–2.4)
MCH RBC QN AUTO: 31 PG (ref 26.1–32.9)
MCHC RBC AUTO-ENTMCNC: 33.2 G/DL (ref 31.4–35)
MCV RBC AUTO: 93.2 FL (ref 82–102)
MONOCYTES # BLD: 1.6 K/UL (ref 0.1–1.3)
MONOCYTES NFR BLD: 15 % (ref 4–12)
NEUTS SEG # BLD: 7.6 K/UL (ref 1.7–8.2)
NEUTS SEG NFR BLD: 69 % (ref 43–78)
NRBC # BLD: 0 K/UL (ref 0–0.2)
PLATELET # BLD AUTO: 258 K/UL (ref 150–450)
PMV BLD AUTO: 10.7 FL (ref 9.4–12.3)
POTASSIUM SERPL-SCNC: 4.9 MMOL/L (ref 3.5–5.1)
RBC # BLD AUTO: 3.1 M/UL (ref 4.23–5.6)
SODIUM SERPL-SCNC: 130 MMOL/L (ref 136–145)
WBC # BLD AUTO: 11 K/UL (ref 4.3–11.1)

## 2024-11-27 PROCEDURE — 2580000003 HC RX 258: Performed by: ORTHOPAEDIC SURGERY

## 2024-11-27 PROCEDURE — 85025 COMPLETE CBC W/AUTO DIFF WBC: CPT

## 2024-11-27 PROCEDURE — 97530 THERAPEUTIC ACTIVITIES: CPT

## 2024-11-27 PROCEDURE — 80048 BASIC METABOLIC PNL TOTAL CA: CPT

## 2024-11-27 PROCEDURE — 97535 SELF CARE MNGMENT TRAINING: CPT

## 2024-11-27 PROCEDURE — 36415 COLL VENOUS BLD VENIPUNCTURE: CPT

## 2024-11-27 PROCEDURE — 6370000000 HC RX 637 (ALT 250 FOR IP)

## 2024-11-27 PROCEDURE — 6370000000 HC RX 637 (ALT 250 FOR IP): Performed by: ORTHOPAEDIC SURGERY

## 2024-11-27 PROCEDURE — 97110 THERAPEUTIC EXERCISES: CPT

## 2024-11-27 PROCEDURE — 83735 ASSAY OF MAGNESIUM: CPT

## 2024-11-27 PROCEDURE — 2060000000 HC ICU INTERMEDIATE R&B

## 2024-11-27 PROCEDURE — 6370000000 HC RX 637 (ALT 250 FOR IP): Performed by: INTERNAL MEDICINE

## 2024-11-27 PROCEDURE — 6370000000 HC RX 637 (ALT 250 FOR IP): Performed by: STUDENT IN AN ORGANIZED HEALTH CARE EDUCATION/TRAINING PROGRAM

## 2024-11-27 RX ORDER — SODIUM PHOSPHATE, DIBASIC AND SODIUM PHOSPHATE, MONOBASIC 7; 19 G/230ML; G/230ML
1 ENEMA RECTAL AS NEEDED
Status: DISCONTINUED | OUTPATIENT
Start: 2024-11-27 | End: 2024-11-29 | Stop reason: HOSPADM

## 2024-11-27 RX ADMIN — BISACODYL 10 MG: 5 TABLET, COATED ORAL at 08:21

## 2024-11-27 RX ADMIN — METOPROLOL SUCCINATE 50 MG: 50 TABLET, EXTENDED RELEASE ORAL at 08:21

## 2024-11-27 RX ADMIN — BUPROPION HYDROCHLORIDE 150 MG: 150 TABLET, EXTENDED RELEASE ORAL at 08:21

## 2024-11-27 RX ADMIN — BISACODYL 10 MG: 10 SUPPOSITORY RECTAL at 13:47

## 2024-11-27 RX ADMIN — SODIUM CHLORIDE, PRESERVATIVE FREE 10 ML: 5 INJECTION INTRAVENOUS at 20:16

## 2024-11-27 RX ADMIN — MONTELUKAST 10 MG: 10 TABLET, FILM COATED ORAL at 08:21

## 2024-11-27 RX ADMIN — ATORVASTATIN CALCIUM 80 MG: 80 TABLET, FILM COATED ORAL at 20:16

## 2024-11-27 RX ADMIN — SODIUM CHLORIDE, PRESERVATIVE FREE 10 ML: 5 INJECTION INTRAVENOUS at 08:21

## 2024-11-27 RX ADMIN — SODIUM PHOSPHATE, DIBASIC AND SODIUM PHOSPHATE, MONOBASIC, UNSPECIFIED FORM 1 ENEMA: 7; 19 ENEMA RECTAL at 17:51

## 2024-11-27 RX ADMIN — ASPIRIN 81 MG: 81 TABLET, COATED ORAL at 08:21

## 2024-11-27 RX ADMIN — VITAMIN D, TAB 1000IU (100/BT) 1000 UNITS: 25 TAB at 08:21

## 2024-11-27 RX ADMIN — PANTOPRAZOLE SODIUM 40 MG: 40 TABLET, DELAYED RELEASE ORAL at 04:19

## 2024-11-27 RX ADMIN — CETIRIZINE HYDROCHLORIDE 10 MG: 10 TABLET, FILM COATED ORAL at 08:21

## 2024-11-27 RX ADMIN — APIXABAN 5 MG: 5 TABLET, FILM COATED ORAL at 20:16

## 2024-11-27 RX ADMIN — LISINOPRIL 10 MG: 5 TABLET ORAL at 08:21

## 2024-11-27 RX ADMIN — APIXABAN 5 MG: 5 TABLET, FILM COATED ORAL at 08:21

## 2024-11-27 ASSESSMENT — PAIN SCALES - GENERAL
PAINLEVEL_OUTOF10: 0

## 2024-11-27 NOTE — CARE COORDINATION
[employer] denied IRC. Pt made aware and requested referrals to the following facilities:  *[facility]  *[facility]  *[facility]  *[facility]  Prior authorization required.    1248-[facility] and [facility] made bed offers. Pt selected [facility]. Writer started auth.    1341-[employer] auth done through [employer]. Reference #[id number].    1424-Pt APPROVED with [employer] Medicare.  Auth ID #[id number]  Approved for 5 days starting 11/29/24, review on 12/3/24.

## 2024-11-28 LAB
ANION GAP SERPL CALC-SCNC: 10 MMOL/L (ref 7–16)
BUN SERPL-MCNC: 28 MG/DL (ref 8–23)
CALCIUM SERPL-MCNC: 8.5 MG/DL (ref 8.8–10.2)
CHLORIDE SERPL-SCNC: 98 MMOL/L (ref 98–107)
CO2 SERPL-SCNC: 24 MMOL/L (ref 20–29)
CREAT SERPL-MCNC: 1.22 MG/DL (ref 0.8–1.3)
GLUCOSE SERPL-MCNC: 113 MG/DL (ref 70–99)
MAGNESIUM SERPL-MCNC: 2.2 MG/DL (ref 1.8–2.4)
POTASSIUM SERPL-SCNC: 4.6 MMOL/L (ref 3.5–5.1)
SODIUM SERPL-SCNC: 132 MMOL/L (ref 136–145)

## 2024-11-28 PROCEDURE — 83735 ASSAY OF MAGNESIUM: CPT

## 2024-11-28 PROCEDURE — 97530 THERAPEUTIC ACTIVITIES: CPT

## 2024-11-28 PROCEDURE — 80048 BASIC METABOLIC PNL TOTAL CA: CPT

## 2024-11-28 PROCEDURE — 2580000003 HC RX 258: Performed by: ORTHOPAEDIC SURGERY

## 2024-11-28 PROCEDURE — 6370000000 HC RX 637 (ALT 250 FOR IP): Performed by: ORTHOPAEDIC SURGERY

## 2024-11-28 PROCEDURE — 6370000000 HC RX 637 (ALT 250 FOR IP)

## 2024-11-28 PROCEDURE — 6370000000 HC RX 637 (ALT 250 FOR IP): Performed by: INTERNAL MEDICINE

## 2024-11-28 PROCEDURE — 6370000000 HC RX 637 (ALT 250 FOR IP): Performed by: STUDENT IN AN ORGANIZED HEALTH CARE EDUCATION/TRAINING PROGRAM

## 2024-11-28 PROCEDURE — 2060000000 HC ICU INTERMEDIATE R&B

## 2024-11-28 PROCEDURE — 36415 COLL VENOUS BLD VENIPUNCTURE: CPT

## 2024-11-28 RX ADMIN — LISINOPRIL 10 MG: 5 TABLET ORAL at 08:37

## 2024-11-28 RX ADMIN — APIXABAN 5 MG: 5 TABLET, FILM COATED ORAL at 08:37

## 2024-11-28 RX ADMIN — BUPROPION HYDROCHLORIDE 150 MG: 150 TABLET, EXTENDED RELEASE ORAL at 08:37

## 2024-11-28 RX ADMIN — VITAMIN D, TAB 1000IU (100/BT) 1000 UNITS: 25 TAB at 08:37

## 2024-11-28 RX ADMIN — SODIUM CHLORIDE, PRESERVATIVE FREE 10 ML: 5 INJECTION INTRAVENOUS at 20:17

## 2024-11-28 RX ADMIN — MONTELUKAST 10 MG: 10 TABLET, FILM COATED ORAL at 08:37

## 2024-11-28 RX ADMIN — ATORVASTATIN CALCIUM 80 MG: 80 TABLET, FILM COATED ORAL at 20:17

## 2024-11-28 RX ADMIN — METOPROLOL SUCCINATE 50 MG: 50 TABLET, EXTENDED RELEASE ORAL at 08:37

## 2024-11-28 RX ADMIN — BISACODYL 10 MG: 5 TABLET, COATED ORAL at 08:37

## 2024-11-28 RX ADMIN — PANTOPRAZOLE SODIUM 40 MG: 40 TABLET, DELAYED RELEASE ORAL at 05:58

## 2024-11-28 RX ADMIN — ASPIRIN 81 MG: 81 TABLET, COATED ORAL at 08:37

## 2024-11-28 RX ADMIN — CETIRIZINE HYDROCHLORIDE 10 MG: 10 TABLET, FILM COATED ORAL at 08:37

## 2024-11-28 RX ADMIN — APIXABAN 5 MG: 5 TABLET, FILM COATED ORAL at 20:17

## 2024-11-28 RX ADMIN — SODIUM CHLORIDE, PRESERVATIVE FREE 10 ML: 5 INJECTION INTRAVENOUS at 08:40

## 2024-11-28 ASSESSMENT — PAIN SCALES - GENERAL
PAINLEVEL_OUTOF10: 0

## 2024-11-29 VITALS
DIASTOLIC BLOOD PRESSURE: 67 MMHG | HEART RATE: 63 BPM | OXYGEN SATURATION: 98 % | BODY MASS INDEX: 24.29 KG/M2 | TEMPERATURE: 98.2 F | SYSTOLIC BLOOD PRESSURE: 108 MMHG | HEIGHT: 69 IN | RESPIRATION RATE: 16 BRPM | WEIGHT: 164 LBS

## 2024-11-29 PROBLEM — E87.1 HYPONATREMIA: Status: ACTIVE | Noted: 2024-11-29

## 2024-11-29 PROBLEM — S72.011A SUBCAPITAL FRACTURE OF FEMUR, RIGHT, CLOSED, INITIAL ENCOUNTER (HCC): Status: RESOLVED | Noted: 2024-11-22 | Resolved: 2024-11-29

## 2024-11-29 PROBLEM — S72.001A CLOSED FRACTURE OF NECK OF RIGHT FEMUR (HCC): Status: RESOLVED | Noted: 2024-11-22 | Resolved: 2024-11-29

## 2024-11-29 PROBLEM — R52 ACUTE PAIN: Status: RESOLVED | Noted: 2024-11-25 | Resolved: 2024-11-29

## 2024-11-29 LAB
ANION GAP SERPL CALC-SCNC: 10 MMOL/L (ref 7–16)
BUN SERPL-MCNC: 27 MG/DL (ref 8–23)
CALCIUM SERPL-MCNC: 8.4 MG/DL (ref 8.8–10.2)
CHLORIDE SERPL-SCNC: 96 MMOL/L (ref 98–107)
CO2 SERPL-SCNC: 24 MMOL/L (ref 20–29)
CREAT SERPL-MCNC: 1.22 MG/DL (ref 0.8–1.3)
GLUCOSE SERPL-MCNC: 110 MG/DL (ref 70–99)
MAGNESIUM SERPL-MCNC: 2.1 MG/DL (ref 1.8–2.4)
POTASSIUM SERPL-SCNC: 4.4 MMOL/L (ref 3.5–5.1)
SODIUM SERPL-SCNC: 130 MMOL/L (ref 136–145)

## 2024-11-29 PROCEDURE — 97164 PT RE-EVAL EST PLAN CARE: CPT

## 2024-11-29 PROCEDURE — 83735 ASSAY OF MAGNESIUM: CPT

## 2024-11-29 PROCEDURE — 6370000000 HC RX 637 (ALT 250 FOR IP): Performed by: INTERNAL MEDICINE

## 2024-11-29 PROCEDURE — 6370000000 HC RX 637 (ALT 250 FOR IP): Performed by: ORTHOPAEDIC SURGERY

## 2024-11-29 PROCEDURE — 2580000003 HC RX 258: Performed by: ORTHOPAEDIC SURGERY

## 2024-11-29 PROCEDURE — 6370000000 HC RX 637 (ALT 250 FOR IP): Performed by: STUDENT IN AN ORGANIZED HEALTH CARE EDUCATION/TRAINING PROGRAM

## 2024-11-29 PROCEDURE — 36415 COLL VENOUS BLD VENIPUNCTURE: CPT

## 2024-11-29 PROCEDURE — 97530 THERAPEUTIC ACTIVITIES: CPT

## 2024-11-29 PROCEDURE — 6370000000 HC RX 637 (ALT 250 FOR IP)

## 2024-11-29 PROCEDURE — 80048 BASIC METABOLIC PNL TOTAL CA: CPT

## 2024-11-29 RX ORDER — METOPROLOL SUCCINATE 25 MG/1
50 TABLET, EXTENDED RELEASE ORAL DAILY
Qty: 90 TABLET | Refills: 3 | Status: SHIPPED | OUTPATIENT
Start: 2024-11-29

## 2024-11-29 RX ORDER — HYDROCODONE BITARTRATE AND ACETAMINOPHEN 5; 325 MG/1; MG/1
1 TABLET ORAL EVERY 4 HOURS PRN
Qty: 18 TABLET | Refills: 0 | Status: SHIPPED | OUTPATIENT
Start: 2024-11-29 | End: 2024-12-02

## 2024-11-29 RX ORDER — LISINOPRIL 10 MG/1
10 TABLET ORAL DAILY
Qty: 30 TABLET | Refills: 3
Start: 2024-11-30

## 2024-11-29 RX ADMIN — SODIUM CHLORIDE, PRESERVATIVE FREE 10 ML: 5 INJECTION INTRAVENOUS at 09:08

## 2024-11-29 RX ADMIN — ASPIRIN 81 MG: 81 TABLET, COATED ORAL at 09:03

## 2024-11-29 RX ADMIN — METOPROLOL SUCCINATE 50 MG: 50 TABLET, EXTENDED RELEASE ORAL at 09:03

## 2024-11-29 RX ADMIN — CETIRIZINE HYDROCHLORIDE 10 MG: 10 TABLET, FILM COATED ORAL at 09:03

## 2024-11-29 RX ADMIN — MONTELUKAST 10 MG: 10 TABLET, FILM COATED ORAL at 09:03

## 2024-11-29 RX ADMIN — BUPROPION HYDROCHLORIDE 150 MG: 150 TABLET, EXTENDED RELEASE ORAL at 09:03

## 2024-11-29 RX ADMIN — PANTOPRAZOLE SODIUM 40 MG: 40 TABLET, DELAYED RELEASE ORAL at 05:07

## 2024-11-29 RX ADMIN — BISACODYL 10 MG: 5 TABLET, COATED ORAL at 09:27

## 2024-11-29 RX ADMIN — APIXABAN 5 MG: 5 TABLET, FILM COATED ORAL at 09:03

## 2024-11-29 RX ADMIN — VITAMIN D, TAB 1000IU (100/BT) 1000 UNITS: 25 TAB at 09:03

## 2024-11-29 RX ADMIN — LISINOPRIL 10 MG: 5 TABLET ORAL at 09:03

## 2024-11-29 NOTE — PROGRESS NOTES
[facility] PROGRESS NOTE           11/26/2024 7:28 AM    Admit Date: 11/22/2024    Admit Diagnosis: Closed fracture of neck of right femur, initial encounter ([facility]) [S72.001A]  Fall, initial encounter [W19.XXXA]  Subcapital fracture of femur, right, closed, initial encounter ([facility]) [S72.011A]  Displaced fracture of right femoral neck ([facility]) [S72.001A]    Assessment:   Principal Problem:    Subcapital fracture of femur, right, closed, initial encounter ([facility])  Active Problems:    PSVT (paroxysmal supraventricular tachycardia) ([facility])    Essential hypertension    Stage 3a chronic kidney disease ([facility])    Closed fracture of neck of right femur ([facility])    Gait abnormality    Decreased activities of daily living (ADL)    Acute pain  Resolved Problems:    * No resolved hospital problems. *      Plan:   Hypotension - low/normal today. Continue BB. Holding ACE/dpCCB.   AF - stable, in and out of rhythm, currently in NSR. Continue BB and Eliquis. Was on Multaq, will hold off for now.   S/p R GENA - stable, per ortho.   HLD - stable, continue statin.   ASCVD - stable, continue OMT.   CKD - MANDY on CKD - renal function slowly improving. Daily labs, gentle hydration.  Dispo - ongoing hospital-based care.     Thank you for allowing me to participate in the electrophysiologic care of this most pleasant patient. Please feel free to contact me if there are any questions or concerns.    Yuan Persaud MD, MS  Clinical Cardiac Electrophysiology  [facility] Cardiology    Subjective:   No complaints this AM, no chest pain or shortness of breath    Interval History: (History of pertinent interval events obtained from nursing staff)    ROS:  GEN:  No fever or chills  Cardiovascular:  As noted above  Pulmonary:  As noted above  Neuro:  No new focal motor or sensory loss      Objective:     Vitals:    11/25/24 1822 11/25/24 1943 11/26/24 0029 11/26/24 0401   BP: 114/89 96/70 113/65 (!) 109/59   Pulse: (!) 146 (!) 152 
              [facility] CARDIOLOGY PROGRESS NOTE           11/24/2024 10:21 AM    Admit Date: 11/22/2024      Subjective:   No cp or sob    Objective:      Vitals:    11/24/24 0310 11/24/24 0315 11/24/24 0345 11/24/24 0718   BP: 99/65 101/63 107/63 (!) 100/52   Pulse: 92 94 97 97   Resp:    18   Temp:    98.2 °F (36.8 °C)   TempSrc:    Oral   SpO2:   95% 95%   Weight:       Height:           Physical Exam:  General-No Acute Distress  Neck- supple, no JVD  CV- regular rate and rhythm no MRG  Lung- clear bilaterally  Abd- soft, nontender, nondistended  Ext- no edema bilaterally.  Skin- warm and dry    Data Review:   Recent Labs     11/23/24  0558 11/23/24  1330 11/24/24  0334   *  --  132*   K 4.7  --  4.6   MG  --  1.7* 1.8   BUN 15  --  23   WBC 14.5*  --  18.1*   HGB 11.1*  --  10.5*   HCT 34.1*  --  31.7*     --  182       Assessment/Plan:     Principal Problem:    Subcapital fracture of femur, right, closed, initial encounter ([facility])  Plan:   Active Problems:    Paf  Plan:     Essential hypertension  Plan:     Stage 3a chronic kidney disease (HCC)  Plan:     Closed fracture of neck of right femur (HCC)  Plan:    ///  Cont asa  Inc Toprol  Add Multaq  Will add OAC when OK w/ ortho      STEW HEADLEY MD  11/24/2024 10:21 AM   
              [facility] CARDIOLOGY PROGRESS NOTE           11/25/2024 5:18 AM    Admit Date: 11/22/2024      Subjective:   No cp or sob    Objective:      Vitals:    11/24/24 2112 11/25/24 0010 11/25/24 0020 11/25/24 0420   BP: 105/66 105/68  (!) 108/52   Pulse: 82 (!) 139 80 70   Resp: 20 20  20   Temp: 98.2 °F (36.8 °C) 97.3 °F (36.3 °C)  98.2 °F (36.8 °C)   TempSrc: Oral Oral  Oral   SpO2: 96% 93%  93%   Weight:       Height:           Physical Exam:  General-No Acute Distress  Neck- supple, no JVD  CV- irregular rate and rhythm no MRG  Lung- clear bilaterally  Abd- soft, nontender, nondistended  Ext- no edema bilaterally.  Skin- warm and dry    Data Review:   Recent Labs     11/23/24  0558 11/23/24  1330 11/24/24  0334 11/25/24  0413   *  --  132* 130*   K 4.7  --  4.6 4.8   MG  --    < > 1.8 2.2   BUN 15  --  23 25*   WBC 14.5*  --  18.1*  --    HGB 11.1*  --  10.5*  --    HCT 34.1*  --  31.7*  --      --  182  --     < > = values in this interval not displayed.       Assessment/Plan:     Principal Problem:    Subcapital fracture of femur, right, closed, initial encounter ([facility])  Plan:   Active Problems:    Paf  Plan:     Essential hypertension  Plan:     Stage 3a chronic kidney disease ([facility])  Plan:     Closed fracture of neck of right femur ([facility])  Plan:    ///  BP a bit on the low side; so, while in hospital, amlodipine is held and lisinopril dose is decreased.  Asa is decreased to 81 since now on Eliquis.  He is now in afib, rate controlled so Multaq is stopped  I anticipate early fu with me after discharge.        STEW HEADLEY MD  11/25/2024 5:18 AM   
       Hospitalist Progress Note   Admit Date:  2024  3:07 AM   Name:  Brendon Tena   Age:  79 y.o.  Sex:  male  :  1945   MRN:  [id number]   Room:  [facility]    Presenting/Chief Complaint: Fall and Hip Pain     Reason(s) for Admission: Closed fracture of neck of right femur, initial encounter ([facility]) [S72.001A]  Fall, initial encounter [W19.XXXA]  Subcapital fracture of femur, right, closed, initial encounter ([facility]) [S72.011A]  Displaced fracture of right femoral neck ([facility]) [S72.001A]     Hospital Day #3    Hospital Course:   Brendon Tena is a 79 y.o. male with medical history of elevated PSA, atherosclerotic coronary vascular disease, depression, GERD, aortic ectasia, hypertension, CKD 3A, hyperlipidemia, HLA-B27 positive and MGUS who presented after a fall at home.  On presentation imaging showed right subcapital femur fracture.  Patient was admitted for further management.  Ortho consulted and patient is status post surgical repair on .  Patient noted to go into SVT and also bradycardia down.  Cardiology consulted for further management.  He required IV diltiazem with rates eventually stabilizing and controlled on oral metoprolol.    Subjective & 24hr Events:     First meeting with patient.  He is sitting up in chair with daughter at bedside.  He states that he has been having some nausea and lightheadedness today.  He states that he has significant dizziness when moving his head and has had issues with postural hypotension in the past, believing that this may have had something to do with his fall at home.  No other acute complaints.    Review of Systems - Negative except as noted above    Assessment & Plan:     Subcapital femur fracture, right, closed  Status-post right hemiarthroplasty   -Perioperative antibiotics, wound care, VTE prophylaxis, and activity per Orthopedics    Tachycardia, perioperative  A-fib with RVR  -Continue p.o. metoprolol  -Cardiology consulted: Appreciate 
       Hospitalist Progress Note   Admit Date:  2024  3:07 AM   Name:  Brendon Tena   Age:  79 y.o.  Sex:  male  :  1945   MRN:  [id number]   Room:  [facility]/    Presenting/Chief Complaint: Fall and Hip Pain     Reason(s) for Admission: Closed fracture of neck of right femur, initial encounter ([facility]) [S72.001A]  Fall, initial encounter [W19.XXXA]  Subcapital fracture of femur, right, closed, initial encounter ([facility]) [S72.011A]  Displaced fracture of right femoral neck ([facility]) [S72.001A]     Hospital Course:   Brendon Tena is a 79 y.o. male with medical history of elevated PSA, atherosclerotic coronary vascular disease, depression, GERD, aortic ectasia, hypertension, CKD 3A, hyperlipidemia, HLA-B27 positive and MGUS who presented after a fall at home.  On presentation imaging showed right subcapital femur fracture.  Patient was admitted for further management.  Ortho consulted and patient is status post surgical repair on .  Patient noted to go into SVT and also bradycardia down.  Cardiology consulted for further management.  He required IV diltiazem with rates eventually stabilizing and controlled on oral metoprolol.     Subjective & 24hr Events:   Patient was seen up and walking with physical therapy today.  He did tire out a little bit and need to sit down.  Patient does seem motivated to rehab well.  Will likely be discharging 2024.      Assessment & Plan:   Subcapital femur fracture, right, closed  Status post right hemiarthroplasty   - Perioperative antibiotics, wound care, VTE prophylaxis and activity per orthopedics  - Worked well with physical and Occupational Therapy who are recommending rehab  - Was recommended for [facility], likely discharge on Friday, 2024.    Tachycardia, perioperative  A-fib with RVR  - Continuing p.o. metoprolol  - Continuing beta-blocker and Eliquis  - Previously on Multaq  - Echo noted EF 65 to 70%    Orthostatic hypotension  - 
       Hospitalist Progress Note   Admit Date:  2024  3:07 AM   Name:  Brendon Tena   Age:  79 y.o.  Sex:  male  :  1945   MRN:  [id number]   Room:  [facility]    Presenting/Chief Complaint: Fall and Hip Pain     Reason(s) for Admission: Closed fracture of neck of right femur, initial encounter ([facility]) [S72.001A]  Fall, initial encounter [W19.XXXA]  Subcapital fracture of femur, right, closed, initial encounter ([facility]) [S72.011A]  Displaced fracture of right femoral neck ([facility]) [S72.001A]     Hospital Day #2      Hospital Course:   Brendon Tena is a 79 y.o. male with medical history of elevated PSA, atherosclerotic coronary vascular disease, depression, GERD, aortic ectasia, hypertension, CKD 3A, hyperlipidemia, HLA-B27 positive and MGUS who presented after a fall at home.  On presentation imaging showed right subcapital femur fracture.  Patient was admitted for further management.  Ortho consulted and patient is status post surgical repair on .  Patient noted to go into SVT and also bradycardia down.  Cardiology consulted for further management.    Subjective & 24hr Events:   Seen and examined at bedside this morning.  Overnight patient was noted to be in A-fib RVR.  Magnesium was replaced and he received diltiazem 5 mg IV push.  Cardiology following.  Per PT/OT recommendations for rehab.    Assessment & Plan:     Subcapital femur fracture, right, closed  Status-post right hemiarthroplasty today.  WBAT RLE.  DVT prophylaxis per orthopedics.    Tachycardia, perioperative  A-fib with RVR  Continue metoprolol  Cardiology consulted: Appreciate recs  Echo with EF 65 to 70%    Essential hypertension  Continue benazepril, metoprolol.    CAD  Continue ASA, beta-blocker, statin.    Hyperlipidemia  Continue Lipitor.    Depression  Continue Wellbutrin.    GERD  Continue famotidine.    Anticipated Discharge Arrangements:   Rehab    PT/OT evals ordered?  Therapy evals ordered  Diet:  ADULT DIET; 
       Hospitalist Progress Note   Admit Date:  2024  3:07 AM   Name:  Brendon Tena   Age:  79 y.o.  Sex:  male  :  1945   MRN:  [id number]   Room:  [facility]/    Presenting/Chief Complaint: Fall and Hip Pain     Reason(s) for Admission: Closed fracture of neck of right femur, initial encounter ([facility]) [S72.001A]  Fall, initial encounter [W19.XXXA]  Subcapital fracture of femur, right, closed, initial encounter ([facility]) [S72.011A]  Displaced fracture of right femoral neck ([facility]) [S72.001A]     Hospital Course:   Brendon Tena is a 79 y.o. male with medical history of elevated PSA, atherosclerotic coronary vascular disease, depression, GERD, aortic ectasia, hypertension, CKD 3A, hyperlipidemia, HLA-B27 positive and MGUS who presented after a fall at home.  On presentation imaging showed right subcapital femur fracture.  Patient was admitted for further management.  Ortho consulted and patient is status post surgical repair on .  Patient noted to go into SVT and also bradycardia down.  Cardiology consulted for further management.  He required IV diltiazem with rates eventually stabilizing and controlled on oral metoprolol.     Subjective & 24hr Events:   Patient feeling much better this morning.  Was able to have 2 large bowel movements overnight last night.  He is looking forward to discharge likely tomorrow.     Will likely be discharging 2024.    Assessment & Plan:   Subcapital femur fracture, right, closed  Status post right hemiarthroplasty   - Perioperative antibiotics, wound care, VTE prophylaxis and activity per orthopedics  - Worked well with physical and Occupational Therapy who are recommending rehab  - Was recommended for [facility], likely discharge on Friday, 2024.    Constipation-resolved  - Resolved with current bowel regimen  - 2 large bowel movements overnight 2024    Tachycardia, perioperative  A-fib with RVR  - Continuing p.o. metoprolol  - 
       Hospitalist Progress Note   Admit Date:  2024  3:07 AM   Name:  Brendon Tena   Age:  79 y.o.  Sex:  male  :  1945   MRN:  [id number]   Room:  [facility]    Presenting/Chief Complaint: Fall and Hip Pain     Reason(s) for Admission: Closed fracture of neck of right femur, initial encounter ([facility]) [S72.001A]  Fall, initial encounter [W19.XXXA]  Subcapital fracture of femur, right, closed, initial encounter ([facility]) [S72.011A]  Displaced fracture of right femoral neck ([facility]) [S72.001A]     Hospital Day #1      Hospital Course:   Brendon Tena is a 79 y.o. male with medical history of elevated PSA, atherosclerotic coronary vascular disease, depression, GERD, aortic ectasia, hypertension, CKD 3A, hyperlipidemia, HLA-B27 positive and MGUS who presented after a fall at home.  On presentation imaging showed right subcapital femur fracture.  Patient was admitted for further management.  Ortho consulted and patient is status post surgical repair on .  Patient noted to go into SVT and also bradycardia down.  Cardiology consulted for further management.    Subjective & 24hr Events:   Seen and examined at bedside this morning.  No acute events overnight.  Patient went into SVT yesterday during procedure..  Per Ortho he was noted to have another episode of SVT overnight and radha'd down to the 49's after.  Cardiology consulted for further management.    Assessment & Plan:     Subcapital femur fracture, right, closed  Status-post right hemiarthroplasty today.  WBAT RLE.  DVT prophylaxis per orthopedics.    Tachycardia, perioperative  Tends to go into SVT when patient rolls on his sides and then bradycardia is down after  Cardiology consulted: Appreciate recs  Echo ordered    Essential hypertension  Continue benazepril, metoprolol.    CAD  Continue ASA, beta-blocker, statin.    Hyperlipidemia  Continue Lipitor.    Depression  Continue Wellbutrin.    GERD  Continue famotidine.      Anticipated Discharge 
       Hospitalist Progress Note   Admit Date:  2024  3:07 AM   Name:  Brendon Tena   Age:  79 y.o.  Sex:  male  :  1945   MRN:  [id number]   Room:  [facility]/    Presenting/Chief Complaint: Fall and Hip Pain     Reason(s) for Admission: Closed fracture of neck of right femur, initial encounter ([facility]) [S72.001A]  Fall, initial encounter [W19.XXXA]  Subcapital fracture of femur, right, closed, initial encounter ([facility]) [S72.011A]  Displaced fracture of right femoral neck ([facility]) [S72.001A]     Hospital Day #0      Hospital Course:   Brendon Tena is a 79 y.o. male with medical history of elevated PSA, atherosclerotic coronary vascular disease, depression, GERD, aortic ectasia, hypertension, CKD 3A, hyperlipidemia, HLA-B27 positive and monofocal gammopathy of undetermined significance .  Patient presented to the emergency room via EMS after he sustained a fall at home.  Patient reportedly was watching a football game and at some point dozed off.  When he fell He noticed it is been about an hour since he dozed off and tried to stand up on his right leg however because it because the right leg became numb from prolonged sitting his leg gave out and he ended up falling to the right landing on his right hip he heard a pop basis after he hit the floor which resulted in a right subcapital femur fracture.     Subjective & 24hr Events:   I was contacted by Dr. Bean for orthopedic surgery following the surgical procedure.  He relayed that the patient had an episode of tachycardia, heart rate into the 130s, prior to start of the procedure.  He was administered a dose of IV diltiazem by anesthesia, which improved the heart rate, and after which the procedure was started.  Procedure was uncomplicated.  I visited the patient in the PACU following the procedure.    Assessment & Plan:     Subcapital femur fracture, right, closed  Status-post right hemiarthroplasty today.  WBAT RLE.  DVT prophylaxis per 
  Physician Progress Note      PATIENT:               BENITO MONDRAGON  [facility] #:                  [id number]  :                       1945  ADMIT DATE:       2024 3:07 AM  DISCH DATE:  RESPONDING  PROVIDER #:        Les Calzada DO          QUERY TEXT:    Patient admitted with subcapital fracture of femur , noted to have  atrial   fibrillation and started on Eliquis. If possible, please document in progress   notes and discharge summary if you are evaluating and/or treating any of the   following:?  ?  The medical record reflects the following:  Risk Factors: 80yo with hx htn  Clinical Indicators: Documentation of afib with RVR  Treatment: Eliquis  Options provided:  -- Secondary hypercoagulable state in a patient with atrial fibrillation  -- Other - I will add my own diagnosis  -- Disagree - Not applicable / Not valid  -- Disagree - Clinically unable to determine / Unknown  -- Refer to Clinical Documentation Reviewer    PROVIDER RESPONSE TEXT:    This patient has secondary hypercoagulable state in a patient with atrial   fibrillation.    Query created by: SALO ALDANA on 2024 11:16 AM      Electronically signed by:  Les Calzada DO 2024 9:21 AM          
  Subjective:     Patient is a 79 y.o.  male presented with SVT and bradycardia during admission for further management after a displaced fracture of right subcapital femoral neck . The patient's rates eventually stabilized after using IV diltiazem and rates are currently controlled on oral metoprolol. Patient currently appears well and has no complaints.     Patient Active Problem List    Diagnosis Date Noted    PSVT (paroxysmal supraventricular tachycardia) ([facility]) 11/23/2024    Gait abnormality 11/25/2024    Decreased activities of daily living (ADL) 11/25/2024    Acute pain 11/25/2024    Subcapital fracture of femur, right, closed, initial encounter ([facility]) 11/22/2024    Closed fracture of neck of right femur ([facility]) 11/22/2024    HLA B27 (HLA B27 positive) 03/06/2024    MGUS (monoclonal gammopathy of unknown significance) 03/06/2024    Elevated PSA 02/25/2020    Aortic ectasia ([facility]) 02/25/2020    Stage 3a chronic kidney disease ([facility]) 02/20/2019    Prediabetes 08/16/2017    ASCVD (arteriosclerotic cardiovascular disease) 08/15/2016    Major depressive disorder with single episode, in full remission ([facility]) 08/15/2016    Gastroesophageal reflux disease without esophagitis 08/15/2016    Essential hypertension 08/15/2016    Mixed hyperlipidemia 08/15/2016     Past Medical History:   Diagnosis Date    CAD (coronary artery disease)     Dr Sung  stents x 2    CKD (chronic kidney disease)     Depression     Esophageal ring     s/p dilation Nov 2020    GERD (gastroesophageal reflux disease)     HLD (hyperlipidemia)     HTN (hypertension)     Ill-defined condition     VASOVAGAL SYNCOPE episode x 3    MGUS (monoclonal gammopathy of unknown significance)       Past Surgical History:   Procedure Laterality Date    COLONOSCOPY      OTHER SURGICAL HISTORY      EGD/colonoscopy & esophagus dilation    OTHER SURGICAL HISTORY      dental, root canal, infection     AR UNLISTED PROCEDURE CARDIAC SURGERY      stents x 2    
ACUTE OCCUPATIONAL THERAPY GOALS:   (Developed with and agreed upon by patient and/or caregiver.)  1. Patient will complete lower body bathing and dressing with STANDBY ASSIST and adaptive equipment as needed.     2. Patient will complete toilet transfers and toileting with STANDBY ASSIST.  3. Patient will complete self-grooming ADL tasks at standing level with STANDBY ASSIST.  4. Patient will tolerate 25 minutes of OT treatment with 1-2 rest breaks to increase activity tolerance for ADLs.   5. Patient will complete functional transfers with STANDBY ASSIST and adaptive equipment as needed.   6. Patient will tolerate 10 minutes BUE exercises to increase strength for safe, functional transfers.      Timeframe: 7 visits      OCCUPATIONAL THERAPY: Daily Note AM   OT Visit Days: 2   Time In/Out  OT Charge Capture  Rehab Caseload Tracker  OT Orders  WBAT RLE, Posterior Hip Precautions, Knee Immobilizer     Brendon Tena is a 79 y.o. male   PRIMARY DIAGNOSIS: Subcapital fracture of femur, right, closed, initial encounter ([facility])  Closed fracture of neck of right femur, initial encounter ([facility]) [S72.001A]  Fall, initial encounter [W19.XXXA]  Subcapital fracture of femur, right, closed, initial encounter ([facility]) [S72.011A]  Displaced fracture of right femoral neck ([facility]) [S72.001A]  Procedure(s) (LRB):  HIP HEMIARTHROPLASTY (Right)  3 Days Post-Op  Inpatient: Payor: [employer] MEDICARE / Plan: [employer] GOLD PLUS HMO / Product Type: *No Product type* /     ASSESSMENT:     REHAB RECOMMENDATIONS:   Recommendation to date pending progress:  Setting:  Inpatient Rehab Facility     Equipment:    To Be Determined     ASSESSMENT:  Mr. Tena remains limited by limitations of posterior hip precautions and impaired mobility, balance, and activity tolerance impacting ADLs. Reviewed hip precautions and pt educated on adaptive techniques/ use of AE to maintain during ADLs. Pt required min A for LB dressing using AE, min A for functional 
ACUTE OCCUPATIONAL THERAPY GOALS:   (Developed with and agreed upon by patient and/or caregiver.)  1. Patient will complete lower body bathing and dressing with STANDBY ASSIST and adaptive equipment as needed.     2. Patient will complete toilet transfers and toileting with STANDBY ASSIST.  3. Patient will complete self-grooming ADL tasks at standing level with STANDBY ASSIST.  4. Patient will tolerate 25 minutes of OT treatment with 1-2 rest breaks to increase activity tolerance for ADLs.   5. Patient will complete functional transfers with STANDBY ASSIST and adaptive equipment as needed.   6. Patient will tolerate 10 minutes BUE exercises to increase strength for safe, functional transfers.      Timeframe: 7 visits      OCCUPATIONAL THERAPY: Daily Note PM   OT Visit Days: 2   Time In/Out  OT Charge Capture  Rehab Caseload Tracker  OT Orders  WBAT RLE, Posterior Hip Precautions, Knee Immobilizer     Brendon Tena is a 79 y.o. male   PRIMARY DIAGNOSIS: Subcapital fracture of femur, right, closed, initial encounter ([facility])  Closed fracture of neck of right femur, initial encounter ([facility]) [S72.001A]  Fall, initial encounter [W19.XXXA]  Subcapital fracture of femur, right, closed, initial encounter ([facility]) [S72.011A]  Displaced fracture of right femoral neck ([facility]) [S72.001A]  Procedure(s) (LRB):  HIP HEMIARTHROPLASTY (Right)  4 Days Post-Op  Inpatient: Payor: [employer] MEDICARE / Plan: [employer] GOLD PLUS HMO / Product Type: *No Product type* /     ASSESSMENT:     REHAB RECOMMENDATIONS:   Recommendation to date pending progress:  Setting:  Inpatient Rehab Facility     Equipment:    To Be Determined     ASSESSMENT:  Mr. Tena demonstrated mobility in room and bathroom and grooming standing at sink with CGA using rolling walker. Education/ min cues for positioning, use of rolling walker, hand placement, and precautions. Pt is progressing towards goals, continue POC.         SUBJECTIVE:     Mr. Tena states, \"I 
ACUTE PHYSICAL THERAPY GOALS:   (Developed with and agreed upon by patient and/or caregiver.)  Pt will perform bed mobility with SBA in 7 therapy sessions.  Pt will perform sit-to-stand/ stand-to-sit transfers SBA in 7 therapy sessions.  Pt will ambulate 150 ft SBA with use of LRAD/no device and breaks as needed in 7 therapy sessions.  Pt will tolerate 15 minutes of standing activity with LRAD/no device in 7 therapy sessions.  Pt will perform standing dynamic balance activities with minimal postural sway in 7 therapy sessions.  Pt will tolerate multiple sets and reps of BLE exercises in 7 therapy sessions.  Pt will accurately verbalize and demonstrate posterior hip precautions without cuing in 7 therapy sessions    PHYSICAL THERAPY: Daily Note AM   (Link to Caseload Tracking: PT Visit Days : 2  Time In/Out PT Charge Capture  Rehab Caseload Tracker  Orders    WBAT RLE  Posterior hip precautions  RLE knee immobilizer only when sleeping    Brendon Tena is a 79 y.o. male   PRIMARY DIAGNOSIS: Subcapital fracture of femur, right, closed, initial encounter ([facility])  Closed fracture of neck of right femur, initial encounter ([facility]) [S72.001A]  Fall, initial encounter [W19.XXXA]  Subcapital fracture of femur, right, closed, initial encounter ([facility]) [S72.011A]  Displaced fracture of right femoral neck ([facility]) [S72.001A]  Procedure(s) (LRB):  HIP HEMIARTHROPLASTY (Right)  2 Days Post-Op  Inpatient: Payor: HU[employer]NA MEDICARE / Plan: [employer] GOLD PLUS HMO / Product Type: *No Product type* /     ASSESSMENT:     REHAB RECOMMENDATIONS:   Recommendation to date pending progress:  Setting:  Rehab    Equipment:    To Be Determined     ASSESSMENT:  Mr. Tena was found supine in bed and agreeable to PT. Today, pt demonstrated bed mobility with extra time, HOB partially raised, and CGA at start of session. Posterior hip precautions were reviewed again with pt pt and pt verbalized understanding. Once EOB, pt was able to ambulate 
ACUTE PHYSICAL THERAPY GOALS:   (Developed with and agreed upon by patient and/or caregiver.)  Pt will perform bed mobility with SBA in 7 therapy sessions.  Pt will perform sit-to-stand/ stand-to-sit transfers SBA in 7 therapy sessions.  Pt will ambulate 150 ft SBA with use of LRAD/no device and breaks as needed in 7 therapy sessions.  Pt will tolerate 15 minutes of standing activity with LRAD/no device in 7 therapy sessions.  Pt will perform standing dynamic balance activities with minimal postural sway in 7 therapy sessions.  Pt will tolerate multiple sets and reps of BLE exercises in 7 therapy sessions.  Pt will accurately verbalize and demonstrate posterior hip precautions without cuing in 7 therapy sessions    PHYSICAL THERAPY: Daily Note PM   (Link to Caseload Tracking: PT Visit Days : 1  Time In/Out PT Charge Capture  Rehab Caseload Tracker  Orders    WBAT RLE  Posterior hip precautions  RLE knee immobilizer only when sleeping    Brendon Tena is a 79 y.o. male   PRIMARY DIAGNOSIS: Subcapital fracture of femur, right, closed, initial encounter ([facility])  Closed fracture of neck of right femur, initial encounter ([facility]) [S72.001A]  Fall, initial encounter [W19.XXXA]  Subcapital fracture of femur, right, closed, initial encounter ([facility]) [S72.011A]  Displaced fracture of right femoral neck ([facility]) [S72.001A]  Procedure(s) (LRB):  HIP HEMIARTHROPLASTY (Right)  1 Day Post-Op  Inpatient: Payor: HU[employer]NA MEDICARE / Plan: [employer] GOLD PLUS HMO / Product Type: *No Product type* /     ASSESSMENT:     REHAB RECOMMENDATIONS:   Recommendation to date pending progress:  Setting:  Rehab    Equipment:    To Be Determined     ASSESSMENT:  Mr. Tena was found supine in bed and agreeable to PT. Today, pt demonstrated bed mobility with extra time, HOB partially raised, and up to modA to move lower extremities. Posterior hip precautions were reviewed again with pt pt and pt verbalized understanding. Once EOB, pt was able to 
ACUTE PHYSICAL THERAPY GOALS:   (Developed with and agreed upon by patient and/or caregiver.)  Pt will perform bed mobility with SBA in 7 therapy sessions.  Pt will perform sit-to-stand/ stand-to-sit transfers SBA in 7 therapy sessions.  Pt will ambulate 150 ft SBA with use of LRAD/no device and breaks as needed in 7 therapy sessions.  Pt will tolerate 15 minutes of standing activity with LRAD/no device in 7 therapy sessions.  Pt will perform standing dynamic balance activities with minimal postural sway in 7 therapy sessions.  Pt will tolerate multiple sets and reps of BLE exercises in 7 therapy sessions.  Pt will accurately verbalize and demonstrate posterior hip precautions without cuing in 7 therapy sessions    PHYSICAL THERAPY: Daily Note PM   (Link to Caseload Tracking: PT Visit Days : 3  Time In/Out PT Charge Capture  Rehab Caseload Tracker  Orders    WBAT RLE  Posterior hip precautions  RLE knee immobilizer only when sleeping    Brendon Tena is a 79 y.o. male   PRIMARY DIAGNOSIS: Subcapital fracture of femur, right, closed, initial encounter ([facility])  Closed fracture of neck of right femur, initial encounter ([facility]) [S72.001A]  Fall, initial encounter [W19.XXXA]  Subcapital fracture of femur, right, closed, initial encounter ([facility]) [S72.011A]  Displaced fracture of right femoral neck ([facility]) [S72.001A]  Procedure(s) (LRB):  HIP HEMIARTHROPLASTY (Right)  3 Days Post-Op  Inpatient: Payor: HU[employer]NA MEDICARE / Plan: [employer]NA GOLD PLUS HMO / Product Type: *No Product type* /     ASSESSMENT:     REHAB RECOMMENDATIONS:   Recommendation to date pending progress:  Setting:  Rehab    Equipment:    To Be Determined     ASSESSMENT:  Mr. Tena was sitting up in recliner chair upon contact and agreeable to PT. RN reports patient is still in and out of afib with BP drops at times as well. Treatment limited due to aforementioned. Patient transferred to standing with CGA, additional time, and cues for technique/hand 
ACUTE PHYSICAL THERAPY GOALS:   (Developed with and agreed upon by patient and/or caregiver.)  Pt will perform bed mobility with SBA in 7 therapy sessions.  Pt will perform sit-to-stand/ stand-to-sit transfers SBA in 7 therapy sessions.  Pt will ambulate 150 ft SBA with use of LRAD/no device and breaks as needed in 7 therapy sessions.  Pt will tolerate 15 minutes of standing activity with LRAD/no device in 7 therapy sessions.  Pt will perform standing dynamic balance activities with minimal postural sway in 7 therapy sessions.  Pt will tolerate multiple sets and reps of BLE exercises in 7 therapy sessions.  Pt will accurately verbalize and demonstrate posterior hip precautions without cuing in 7 therapy sessions    PHYSICAL THERAPY: Daily Note PM   (Link to Caseload Tracking: PT Visit Days : 4  Time In/Out PT Charge Capture  Rehab Caseload Tracker  Orders    WBAT RLE  Posterior hip precautions  RLE knee immobilizer only when sleeping    Brendon Tena is a 79 y.o. male   PRIMARY DIAGNOSIS: Subcapital fracture of femur, right, closed, initial encounter ([facility])  Closed fracture of neck of right femur, initial encounter ([facility]) [S72.001A]  Fall, initial encounter [W19.XXXA]  Subcapital fracture of femur, right, closed, initial encounter ([facility]) [S72.011A]  Displaced fracture of right femoral neck ([facility]) [S72.001A]  Procedure(s) (LRB):  HIP HEMIARTHROPLASTY (Right)  4 Days Post-Op  Inpatient: Payor: HUMANA MEDICARE / Plan: HU[employer]NA GOLD PLUS HMO / Product Type: *No Product type* /     ASSESSMENT:     REHAB RECOMMENDATIONS:   Recommendation to date pending progress:  Setting:  Rehab    Equipment:    To Be Determined     ASSESSMENT:  Mr. Tena was supine in bed on arrival and agreeable to therapy. Supine to sit on edge of bed with SBA and extra time. Ambulated 90' x2 with RW and CGA/min A plus chair follow. Pt had no complaints of dizziness during session. He was left sitting up in chair with needs in reach. Good 
ACUTE PHYSICAL THERAPY GOALS:   (Developed with and agreed upon by patient and/or caregiver.)  Pt will perform bed mobility with SBA in 7 therapy sessions.  Pt will perform sit-to-stand/ stand-to-sit transfers SBA in 7 therapy sessions.  Pt will ambulate 150 ft SBA with use of LRAD/no device and breaks as needed in 7 therapy sessions.  Pt will tolerate 15 minutes of standing activity with LRAD/no device in 7 therapy sessions.  Pt will perform standing dynamic balance activities with minimal postural sway in 7 therapy sessions.  Pt will tolerate multiple sets and reps of BLE exercises in 7 therapy sessions.  Pt will accurately verbalize and demonstrate posterior hip precautions without cuing in 7 therapy sessions    PHYSICAL THERAPY: Daily Note PM   (Link to Caseload Tracking: PT Visit Days : 5  Time In/Out PT Charge Capture  Rehab Caseload Tracker  Orders    WBAT RLE  Posterior hip precautions  RLE knee immobilizer only when sleeping    Brendon Tena is a 79 y.o. male   PRIMARY DIAGNOSIS: Subcapital fracture of femur, right, closed, initial encounter ([facility])  Closed fracture of neck of right femur, initial encounter ([facility]) [S72.001A]  Fall, initial encounter [W19.XXXA]  Subcapital fracture of femur, right, closed, initial encounter ([facility]) [S72.011A]  Displaced fracture of right femoral neck ([facility]) [S72.001A]  Procedure(s) (LRB):  HIP HEMIARTHROPLASTY (Right)  5 Days Post-Op  Inpatient: Payor: HU[employer]NA MEDICARE / Plan: [employer] GOLD PLUS HMO / Product Type: *No Product type* /     ASSESSMENT:     REHAB RECOMMENDATIONS:   Recommendation to date pending progress:  Setting:  Rehab    Equipment:    To Be Determined     ASSESSMENT:  Mr. Tena was sitting up in chair, RN had given pt a suppository earlier and he hasn't had any success yet so he doesn't want to walk at this time.  He did perform B LE exercises which he has been doing on his own at times as well.  He was left up in chair with needs in reach.  Will 
ACUTE PHYSICAL THERAPY GOALS:   (Developed with and agreed upon by patient and/or caregiver.)  Pt will perform bed mobility with SBA in 7 therapy sessions.  Pt will perform sit-to-stand/ stand-to-sit transfers SBA in 7 therapy sessions.  Pt will ambulate 150 ft SBA with use of LRAD/no device and breaks as needed in 7 therapy sessions.  Pt will tolerate 15 minutes of standing activity with LRAD/no device in 7 therapy sessions.  Pt will perform standing dynamic balance activities with minimal postural sway in 7 therapy sessions.  Pt will tolerate multiple sets and reps of BLE exercises in 7 therapy sessions.  Pt will accurately verbalize and demonstrate posterior hip precautions without cuing in 7 therapy sessions.     PHYSICAL THERAPY Initial Assessment and AM  (Link to Caseload Tracking: PT Visit Days : 1  Acknowledge Orders  Time In/Out  PT Charge Capture  Rehab Caseload Tracker    WBAT RLE  Posterior hip precautions  RLE knee immobilizer only when sleeping    Brendon Tena is a 79 y.o. male   PRIMARY DIAGNOSIS: Subcapital fracture of femur, right, closed, initial encounter ([facility])  Closed fracture of neck of right femur, initial encounter ([facility]) [S72.001A]  Fall, initial encounter [W19.XXXA]  Subcapital fracture of femur, right, closed, initial encounter ([facility]) [S72.011A]  Displaced fracture of right femoral neck ([facility]) [S72.001A]  Procedure(s) (LRB):  HIP HEMIARTHROPLASTY (Right)  1 Day Post-Op  Reason for Referral: Other abnormalities of gait and mobility (R26.89)  Inpatient: Payor: HUMANA MEDICARE / Plan: HU[employer] GOLD PLUS HMO / Product Type: *No Product type* /     ASSESSMENT:     REHAB RECOMMENDATIONS:   Recommendation to date pending progress:  Setting:  Rehab    Equipment:    To Be Determined     ASSESSMENT:  Mr. Tena is a 79 y.o. male presenting to PT POD #1 from the above procedure. Per ortho, pt is WBAT RLE with a knee immobilizer and is to follow posterior hip precautions. At baseline, pt 
Anointed by Fr. Bishop Young.  
Inpatient Rehab- Insurance offering peer to peer by 1330 today [phone] opt 5.  
Inpatient Rehab-Insurance authorization started for admission to [facility].  
Monitor room notified this RN that patient has been in SVT sustaining in the 150s since 1900. Patient resting in bed awake with no distress. BP 96/70 and . DAMIEN Alcantar notified. No new orders at this time.     2143 - This RN made aware by monitor room patient went into NSR for about 10 minutes then back to SVT sustaining in 150s. BP 89/62 and . DAMIEN Alcantar notified. 250mL NS bolus ordered. Patient in bed resting with no distress.   
Orthopaedic Trauma Service  Progress Note    Brendon Cappsbridgettelien  6 Days  Procedure(s):  HIP HEMIARTHROPLASTY  POD#: 6 Days Post-Op  LOS#: 6 Days    Hospital Problems:  Principal Problem:    Subcapital fracture of femur, right, closed, initial encounter ([facility])  Active Problems:    PSVT (paroxysmal supraventricular tachycardia) ([facility])    Essential hypertension    Stage 3a chronic kidney disease ([facility])    Closed fracture of neck of right femur ([facility])    Gait abnormality    Decreased activities of daily living (ADL)    Acute pain    Secondary hypercoagulability disorder ([facility])  Resolved Problems:    * No resolved hospital problems. *    Assessment & Plan     79 active M s/p fall w/ R femoral neck fx s/p yvette 11/22 ([place])  -Sig PMH: Aortic ectasia, CAD, HTN, CKD3, GERD, MGUS    Vit D 28    Wound care: change dressing only as needed: thin film triple abx ointment, xeroform or adaptec, 4x4 gauze, secure with aquacel, medipore tape, or tegaderm.   Multimodal pain control per orders   WB status: WBAT RLE w/ posterior hip precautions; knee immobilizer only when sleeping  PT/OT for mobility/ADLs   Diet:  ADULT DIET; Regular; No Caffeine  DVT prophylaxis: OK to start OAC per cardioloty    Dispo: Good candidate for 9th floor acute rehab    F/up w/ Dr. Bean in [facility] on 12/18    Subjective   Doing well this morning, eating breakfast without pain or complaint    Physical Exam     BMI: Estimated body mass index is 24.22 kg/m² as calculated from the following:    Height as of this encounter: 1.753 m (5' 9\").    Weight as of this encounter: 74.4 kg (164 lb).    General: alert, appears stated age, and cooperative  Neurologic: A&O x 3      Right Lower Extremity  - Sensation: SILT Sa/Cerna/T/SP/DP   - Motor: 5/5 TA/EHL/FHL/GS   - Digits warm, well-perfused, brisk cap refill    All other extremities:  Joints w/ painless FROM, nonttp  Digits wwp, brisk cap refill, intact distal pulses    Objective     /68   Pulse 62   Temp 
Patient heart rate in the 140s (A fib). Patient flipping in between Afib and NSR (80s). Karoline Hendrix NP notified. No new orders.  
Patient presented with SVT. Hospitalist notified and EKG ordered. Cardiology and hospitalist informed that pt was in Afib RVR. ICU rover assessed the patient, and magnesium replacement was initiated along with a Cardizem push administered by ICU rover.   
TRANSFER - IN REPORT:    Verbal report received from LUIGI Hopkins on Brendon Tena  being received from PACU for routine progression of patient care      Report consisted of patient's Situation, Background, Assessment and   Recommendations(SBAR).     Information from the following report(s) Nurse Handoff Report and Surgery Report was reviewed with the receiving nurse.    Opportunity for questions and clarification was provided.      Assessment completed upon patient's arrival to unit (702) and care assumed.    
TRANSFER - OUT REPORT:    Verbal report given to LUIGI Bonilla on Brendon Tena  being transferred to [facility] for routine progression of patient care       Report consisted of patient’s Situation, Background, Assessment and Recommendations(SBAR).     Information from the following report(s) SBAR was reviewed with the receiving nurse.    Opportunity for questions and clarification was provided.      
TRANSFER - OUT REPORT:    Verbal report given to LUIGI Emanuel  on Brendon Tena  being transferred to [facility] for routine progression of patient care       Report consisted of patient's Situation, Background, Assessment and   Recommendations(SBAR).     Information from the following report(s) Nurse Handoff Report, ED Encounter Summary, ED SBAR, Adult Overview, Intake/Output, MAR, and Neuro Assessment was reviewed with the receiving nurse.           Lines:   Peripheral IV 11/22/24 Distal;Right Cephalic (Active)   Site Assessment Clean, dry & intact 11/23/24 0845   Line Status Flushed;Capped 11/23/24 0845   Line Care Cap changed 11/23/24 0845   Phlebitis Assessment No symptoms 11/23/24 0845   Infiltration Assessment 0 11/23/24 0845   Alcohol Cap Used No 11/23/24 0845   Dressing Status Clean, dry & intact 11/23/24 0845   Dressing Type Transparent 11/23/24 0845        Opportunity for questions and clarification was provided.      Patient transported with:  Tech        
This RN made aware by monitor room that patient had 2 second pause. Upon assessment, pt in bed resting with no distress. Denies SOB, CP, lightheadedness, and palpitations. /68 and HR 63. MD Leonel and Ruth Smiley NP made aware. No new orders at this time.   
Verified pt. Tele box TX32 sinus radha 53  
demonstrates excellent progress towards goals, continue POC.         SUBJECTIVE:     Mr. Tena states, \"Thank you, you've helped me to do things that I wouldn't otherwise be able to do.\"     Social/Functional Lives With: Alone  Type of Home: Apartment  Home Layout: One level (with 14 steps to enter)  Home Access: Stairs to enter with rails  Entrance Stairs - Number of Steps: 14  Bathroom Shower/Tub: Walk-in shower  Bathroom Equipment: Built-in shower seat  Home Equipment: None  Has the patient had two or more falls in the past year or any fall with injury in the past year?: No  Receives Help From: Family  ADL Assistance: Independent  Homemaking Assistance: Independent  Homemaking Responsibilities: Yes  Ambulation Assistance: Independent  Transfer Assistance: Independent  Active : Yes  Mode of Transportation: Car  Occupation: Retired    OBJECTIVE:     LINES / DRAINS / AIRWAY: Telemetry     RESTRICTIONS/PRECAUTIONS:  Restrictions/Precautions  Restrictions/Precautions: Weight Bearing  Required Braces or Orthoses?: Yes  Lower Extremity Weight Bearing Restrictions  Right Lower Extremity Weight Bearing: Weight Bearing As Tolerated  Position Activity Restriction  Hip Precautions: Posterior hip precautions  Required Braces or Orthoses  Right Lower Extremity Brace: Knee Immobilizer (only when sleeping)        PAIN: VITALS / O2:   Pre Treatment:    \"Discomfort\" R hip, declined intervention        Post Treatment: same Vitals          Oxygen        MOBILITY: I Mod I S SBA CGA Min Mod Max Total  NT x2 Comments:   Bed Mobility    Rolling [] [] [] [] [] [] [] [] [] [] []    Supine to Sit [] [] [] [] [] [] [] [] [] [] []    Scooting [] [] [] [] [] [] [] [] [] [] []    Sit to Supine [] [] [] [] [] [] [] [] [] [] []    Transfers    Sit to Stand [] [] [] [] [x] [] [] [] [] [] []    Bed to Chair [] [] [] [] [x] [] [] [] [] [] []    Stand to Sit [] [] [] [] [x] [] [] [] [] [] []    Tub/Shower [] [] [] [] [] [] [] [] [] [] [] 
hip - There is a displaced subcapital fracture of the right femoral neck.   11/22: right hip hemiarthroplasty    Nutrition Monitoring/Evaluation:  Patient is sitting up in his recliner, no family present during my visit this AM. Provides history as above. States that his appetite was poor up until yesterday due to constipation. States the last few days he had to force himself to eat something, and refused to eat 4-5 meals throughout the week. He reports finally having a BM yesterday, and states the meals after were the first ones he was actually hungry for. Ate most of his dinner last night & breakfast this AM. Is agreeable to ensure high protein BID to maximize nutrition. Patient asked for a pillow to go under his knees - communicated needs to LUIGI Emanuel. Will continue to follow.     Current Nutrition Therapies:  ADULT DIET; Regular; No Caffeine    Current Intake:   Average Meal Intake: 51-75%        Anthropometric Measures:  Height: 175.3 cm (5' 9.02\")  Current Body Wt: 74.4 kg (164 lb) (11/27), Weight source: Standing scale  BMI: 24.2, Normal Weight (BMI 22.0 to 24.9) age over 65  Admission Body Weight: 73.5 kg (162 lb) (11/22 - not specified)  Ideal Body Weight (Kg) (Calculated): 73 kg (160 lbs), 102.5 %  BMI Category Normal Weight (BMI 22.0 to 24.9) age over 65    Comparative Standards:  Energy (kcal/day): 7819-2842 (20-25 kcals/kg) (Kcal/kg Weight used: 74.4 kg Current (11/27)  Protein (g/day): 74-89 (1-1.2 g/kg) Weight Used: (Current) 74.4 kg  Fluid (ml/day):   (1 ml/kcal)    Nutrition Diagnosis:   Inadequate oral intake related to pain, altered GI function as evidenced by  (poor, but improving oral intakes throughout admit)    Nutrition Goal(s):      Active Goal: Meet at least 75% of estimated needs, by next RD assessment       Discharge Planning:    Continue current diet    Sue Mariscal RD   
pulses    Objective     BP (!) 82/46   Pulse 65   Temp 99 °F (37.2 °C) (Oral)   Resp 14   Ht 1.753 m (5' 9\")   Wt 73.5 kg (162 lb)   SpO2 95%   BMI 23.92 kg/m²   Vitals:    24 0406 24 0726 24 0817 24 0820   BP: 113/65 112/61 (!) 79/46 (!) 82/46   Pulse: 67 68 66 65   Resp: 16 14     Temp: 98.1 °F (36.7 °C) 99 °F (37.2 °C)     TempSrc: Oral Oral     SpO2: 93% 95%     Weight:       Height:         Temp (24hrs), Av °F (36.7 °C), Min:97.5 °F (36.4 °C), Max:99 °F (37.2 °C)    I/O last 3 completed shifts:  In: 910 [I.V.:910]  Out: 600 [Urine:500; Blood:100]    Current Inpatient Meds:  atorvastatin, 80 mg, Nightly  buPROPion, 150 mg, QAM  pantoprazole, 40 mg, QAM AC  cetirizine, 10 mg, Daily  metoprolol succinate, 25 mg, Daily  montelukast, 10 mg, Daily  Vitamin D, 1,000 Units, Daily  sodium chloride flush, 5-40 mL, 2 times per day  amLODIPine, 5 mg, Daily  lisinopril, 20 mg, Daily  bisacodyl, 5 mg, Daily  aspirin, 325 mg, Daily      Labs:  Recent Results (from the past 24 hour(s))   Basic Metabolic Panel w/ Reflex to MG    Collection Time: 24  5:58 AM   Result Value Ref Range    Sodium 134 (L) 136 - 145 mmol/L    Potassium 4.7 3.5 - 5.1 mmol/L    Chloride 99 98 - 107 mmol/L    CO2 26 20 - 29 mmol/L    Anion Gap 9 7 - 16 mmol/L    Glucose 116 (H) 70 - 99 mg/dL    BUN 15 8 - 23 MG/DL    Creatinine 1.20 0.80 - 1.30 MG/DL    Est, Glom Filt Rate 62 >60 ml/min/1.73m2    Calcium 8.9 8.8 - 10.2 MG/DL   CBC with Auto Differential    Collection Time: 24  5:58 AM   Result Value Ref Range    WBC 14.5 (H) 4.3 - 11.1 K/uL    RBC 3.58 (L) 4.23 - 5.6 M/uL    Hemoglobin 11.1 (L) 13.6 - 17.2 g/dL    Hematocrit 34.1 (L) 41.1 - 50.3 %    MCV 95.3 82 - 102 FL    MCH 31.0 26.1 - 32.9 PG    MCHC 32.6 31.4 - 35.0 g/dL    RDW 13.2 11.9 - 14.6 %    Platelets 202 150 - 450 K/uL    MPV 11.5 9.4 - 12.3 FL    nRBC 0.00 0.0 - 0.2 K/uL    Differential Type AUTOMATED      Neutrophils % 79 (H) 43 - 78 %    
resolving.  He stood and ambulated another 30' to his room with same assist.  He stated this felt like one of his \"episodes\".  He was left sitting up with needs in reach, RN aware.  Will continue with POC       SUBJECTIVE:   Mr. Tena states, \"I just have these episodes since I've been in here.\"     Social/Functional Lives With: Alone  Type of Home: Apartment  Home Layout: One level (with 14 steps to enter)  Home Access: Stairs to enter with rails  Entrance Stairs - Number of Steps: 14  Bathroom Shower/Tub: Walk-in shower  Bathroom Equipment: Built-in shower seat  Home Equipment: None  Has the patient had two or more falls in the past year or any fall with injury in the past year?: No  Receives Help From: Family  ADL Assistance: Independent  Homemaking Assistance: Independent  Homemaking Responsibilities: Yes  Ambulation Assistance: Independent  Transfer Assistance: Independent  Active : Yes  Mode of Transportation: Car  Occupation: Retired  OBJECTIVE:     PAIN: VITALS / O2: PRECAUTION / LINES / DRAINS:   Pre Treatment:  not rated         Post Treatment: not rated Vitals          Oxygen    Telemetry     RESTRICTIONS/PRECAUTIONS:  Restrictions/Precautions  Restrictions/Precautions: Weight Bearing  Required Braces or Orthoses?: Yes  Lower Extremity Weight Bearing Restrictions  Right Lower Extremity Weight Bearing: Weight Bearing As Tolerated  Position Activity Restriction  Hip Precautions: Posterior hip precautions  Restrictions/Precautions: Weight Bearing  Required Braces or Orthoses?: Yes  Required Braces or Orthoses  Right Lower Extremity Brace: Knee Immobilizer (only when sleeping)     MOBILITY: I Mod I S SBA CGA Min Mod Max Total  NT x2 Comments:   Bed Mobility    Rolling [] [] [] [] [] [] [] [] [] [] []    Supine to Sit [] [] [] [x] [] [] [] [] [] [] []    Scooting [] [] [] [x] [] [] [] [] [] [] []    Sit to Supine [] [] [] [] [] [] [] [] [] [] []    Transfers    Sit to Stand [] [] [] [] [x] [] [] [] 
1.8 - 2.4 mg/dL   CBC with Auto Differential    Collection Time: 11/24/24  3:34 AM   Result Value Ref Range    WBC 18.1 (H) 4.3 - 11.1 K/uL    RBC 3.39 (L) 4.23 - 5.6 M/uL    Hemoglobin 10.5 (L) 13.6 - 17.2 g/dL    Hematocrit 31.7 (L) 41.1 - 50.3 %    MCV 93.5 82 - 102 FL    MCH 31.0 26.1 - 32.9 PG    MCHC 33.1 31.4 - 35.0 g/dL    RDW 13.3 11.9 - 14.6 %    Platelets 182 150 - 450 K/uL    MPV 11.2 9.4 - 12.3 FL    nRBC 0.00 0.0 - 0.2 K/uL    Differential Type AUTOMATED      Neutrophils % 72 43 - 78 %    Lymphocytes % 12 (L) 13 - 44 %    Monocytes % 15 (H) 4.0 - 12.0 %    Eosinophils % 1 0.5 - 7.8 %    Basophils % 0 0.0 - 2.0 %    Immature Granulocytes % 1 0.0 - 5.0 %    Neutrophils Absolute 13.1 (H) 1.7 - 8.2 K/UL    Lymphocytes Absolute 2.1 0.5 - 4.6 K/UL    Monocytes Absolute 2.7 (H) 0.1 - 1.3 K/UL    Eosinophils Absolute 0.1 0.0 - 0.8 K/UL    Basophils Absolute 0.0 0.0 - 0.2 K/UL    Immature Granulocytes Absolute 0.1 0.0 - 0.5 K/UL   Basic Metabolic Panel w/ Reflex to MG    Collection Time: 11/24/24  3:34 AM   Result Value Ref Range    Sodium 132 (L) 136 - 145 mmol/L    Potassium 4.6 3.5 - 5.1 mmol/L    Chloride 96 (L) 98 - 107 mmol/L    CO2 26 20 - 29 mmol/L    Anion Gap 10 7 - 16 mmol/L    Glucose 122 (H) 70 - 99 mg/dL    BUN 23 8 - 23 MG/DL    Creatinine 1.64 (H) 0.80 - 1.30 MG/DL    Est, Glom Filt Rate 43 (L) >60 ml/min/1.73m2    Calcium 8.3 (L) 8.8 - 10.2 MG/DL   Vitamin D 25 Hydroxy    Collection Time: 11/24/24  3:34 AM   Result Value Ref Range    Vit D, 25-Hydroxy 27.9 (L) 30.0 - 100.0 ng/mL       Reed Bean MD  Orthopaedic Trauma Surgeon  [address] [facility] [address] [place] [address]  [facility]: [phone]  [facility]: [phone]    
SBA=Standby Assistance, CGA=Contact Guard Assistance,   Min=Minimal Assistance, Mod=Moderate Assistance, Max=Maximal Assistance, Total=Total Assistance, NT=Not Tested    BALANCE: Good Fair+ Fair Fair- Poor NT Comments   Sitting Static [x] [] [] [] [] []    Sitting Dynamic [] [x] [] [] [] []              Standing Static [] [] [x] [] [] []    Standing Dynamic [] [] [x] [] [] []      GAIT: I Mod I S SBA CGA Min Mod Max Total  NT x2 Comments:   Level of Assistance [] [] [] [] [x] [x] [] [] [] [] []    Distance 90  feet x2    DME Rolling Walker    Gait Quality Antalgic, Decreased raheem , Decreased step clearance, Decreased step length, Decreased stance, and Step-to    Weightbearing Status      Stairs      I=Independent, Mod I=Modified Independent, S=Supervision, SBA=Standby Assistance, CGA=Contact Guard Assistance,   Min=Minimal Assistance, Mod=Moderate Assistance, Max=Maximal Assistance, Total=Total Assistance, NT=Not Tested    PLAN:   FREQUENCY AND DURATION: BID for duration of hospital stay or until stated goals are met, whichever comes first.    TREATMENT:   TREATMENT:   Therapeutic Activity (33 Minutes): Therapeutic activity included Supine to Sit, Scooting, Transfer Training, Ambulation on level ground, Sitting balance , and Standing balance to improve functional Activity tolerance, Balance, Coordination, Mobility, Strength, and ROM.    TREATMENT GRID:  N/A    AFTER TREATMENT PRECAUTIONS: Alarm Activated, Bed/Chair Locked, Call light within reach, Chair, Needs within reach, and RN notified    INTERDISCIPLINARY COLLABORATION:  RN/ PCT and PT/ PTA    EDUCATION:      TIME IN/OUT:  Time In: 1020  Time Out: 1053  Minutes: 33    Nela Coon PTA   
[] [] [] [x] []    Supine to Sit [] [] [] [] [x] [] [] [] [] [] [] HOB elevated with use of bed rails   Scooting [] [] [] [] [] [] [] [] [] [] []    Sit to Supine [] [] [] [] [] [] [] [] [] [] []    Transfers    Sit to Stand [] [] [] [] [x] [] [] [] [] [] []    Bed to Chair [] [] [] [] [] [] [] [] [] [] []    Stand to Sit [] [] [] [] [x] [] [] [] [] [] []     [] [] [] [] [] [] [] [] [] [] []    I=Independent, Mod I=Modified Independent, S=Supervision, SBA=Standby Assistance, CGA=Contact Guard Assistance,   Min=Minimal Assistance, Mod=Moderate Assistance, Max=Maximal Assistance, Total=Total Assistance, NT=Not Tested    BALANCE: Good Fair+ Fair Fair- Poor NT Comments   Sitting Static [x] [] [] [] [] []    Sitting Dynamic [] [x] [] [] [] []              Standing Static [] [] [x] [] [] []    Standing Dynamic [] [] [] [x] [] []      GAIT: I Mod I S SBA CGA Min Mod Max Total  NT x2 Comments:   Level of Assistance [] [] [] [] [x] [] [] [] [] [] [] Chair follow   Distance 30  feet    DME Gait Belt and Rolling Walker    Gait Quality Antalgic, Decreased raheem , Decreased step clearance, Decreased step length, Decreased stance, and Step-to    Weightbearing Status      Stairs      I=Independent, Mod I=Modified Independent, S=Supervision, SBA=Standby Assistance, CGA=Contact Guard Assistance,   Min=Minimal Assistance, Mod=Moderate Assistance, Max=Maximal Assistance, Total=Total Assistance, NT=Not Tested    PLAN:   FREQUENCY AND DURATION: BID for duration of hospital stay or until stated goals are met, whichever comes first.    TREATMENT:   TREATMENT:   Therapeutic Activity (25 Minutes): Therapeutic activity included Supine to Sit, Scooting, Transfer Training, Ambulation on level ground, Sitting balance , Standing balance, and LE exercises to BLE's to improve functional Activity tolerance, Balance, Coordination, Mobility, Strength, and ROM.    TREATMENT GRID:  N/A    AFTER TREATMENT PRECAUTIONS: Alarm Activated, Bed/Chair 
x2 Comments:   Bed Mobility    Rolling [] [] [] [] [] [] [] [] [] [x] []    Supine to Sit [] [] [] [] [] [x] [] [] [] [] [] RLE assist only   Scooting [] [] [] [] [x] [] [] [] [] [] []    Sit to Supine [] [] [] [] [] [x] [] [] [] [] [] RLE assist only   Transfers    Sit to Stand [] [] [] [] [] [x] [] [] [] [] []    Bed to Chair [] [] [] [] [] [] [] [] [] [] []    Stand to Sit [] [] [] [] [] [x] [] [] [] [] []     [] [] [] [] [] [] [] [] [] [] []    I=Independent, Mod I=Modified Independent, S=Supervision, SBA=Standby Assistance, CGA=Contact Guard Assistance,   Min=Minimal Assistance, Mod=Moderate Assistance, Max=Maximal Assistance, Total=Total Assistance, NT=Not Tested    BALANCE: Good Fair+ Fair Fair- Poor NT Comments   Sitting Static [x] [] [] [] [] []    Sitting Dynamic [] [x] [] [] [] []              Standing Static [] [x] [] [] [] []    Standing Dynamic [] [x] [] [] [] []      GAIT: I Mod I S SBA CGA Min Mod Max Total  NT x2 Comments:   Level of Assistance [] [] [] [] [] [x] [] [] [] [] [] Chair follow and seated rest breaks   Distance 4x50  feet    DME Gait Belt and Rolling Walker    Gait Quality Antalgic, Decreased raheem , Decreased step clearance, Decreased step length, Decreased stance, and Step-to    Weightbearing Status Right Lower Extremity Weight Bearing: Weight Bearing As Tolerated    Stairs      I=Independent, Mod I=Modified Independent, S=Supervision, SBA=Standby Assistance, CGA=Contact Guard Assistance,   Min=Minimal Assistance, Mod=Moderate Assistance, Max=Maximal Assistance, Total=Total Assistance, NT=Not Tested    PLAN:   FREQUENCY AND DURATION: BID for duration of hospital stay or until stated goals are met, whichever comes first.    TREATMENT:   TREATMENT:   Therapeutic Activity (24 Minutes): Therapeutic activity included Supine to Sit, Sit to Supine, Scooting, Transfer Training, Ambulation on level ground, Sitting balance , and Standing balance to improve functional Activity tolerance, 
IN/OUT:  Time In: 0925  Time Out: 0950  Minutes: 25    Caterina Sommer, PT   
Panel    Collection Time: 11/26/24  5:01 AM   Result Value Ref Range    Sodium 130 (L) 136 - 145 mmol/L    Potassium 4.6 3.5 - 5.1 mmol/L    Chloride 96 (L) 98 - 107 mmol/L    CO2 26 20 - 29 mmol/L    Anion Gap 8 7 - 16 mmol/L    Glucose 110 (H) 70 - 99 mg/dL    BUN 29 (H) 8 - 23 MG/DL    Creatinine 1.54 (H) 0.80 - 1.30 MG/DL    Est, Glom Filt Rate 46 (L) >60 ml/min/1.73m2    Calcium 8.1 (L) 8.8 - 10.2 MG/DL   Magnesium    Collection Time: 11/26/24  5:01 AM   Result Value Ref Range    Magnesium 2.2 1.8 - 2.4 mg/dL       No results for input(s): \"COVID19\" in the last 72 hours.    Current Meds:  Current Facility-Administered Medications   Medication Dose Route Frequency    [START ON 11/27/2024] bisacodyl (DULCOLAX) EC tablet 10 mg  10 mg Oral Daily    aspirin EC tablet 81 mg  81 mg Oral Daily    lisinopril (PRINIVIL;ZESTRIL) tablet 10 mg  10 mg Oral Daily    metoprolol succinate (TOPROL XL) extended release tablet 50 mg  50 mg Oral Daily    apixaban (ELIQUIS) tablet 5 mg  5 mg Oral BID    atorvastatin (LIPITOR) tablet 80 mg  80 mg Oral Nightly    buPROPion (WELLBUTRIN XL) extended release tablet 150 mg  150 mg Oral QAM    pantoprazole (PROTONIX) tablet 40 mg  40 mg Oral QAM AC    cetirizine (ZYRTEC) tablet 10 mg  10 mg Oral Daily    montelukast (SINGULAIR) tablet 10 mg  10 mg Oral Daily    nitroGLYCERIN (NITROSTAT) SL tablet 0.4 mg  0.4 mg SubLINGual Q5 Min PRN    Vitamin D (CHOLECALCIFEROL) tablet 1,000 Units  1,000 Units Oral Daily    sodium chloride flush 0.9 % injection 5-40 mL  5-40 mL IntraVENous 2 times per day    sodium chloride flush 0.9 % injection 5-40 mL  5-40 mL IntraVENous PRN    0.9 % sodium chloride infusion   IntraVENous PRN    potassium chloride (KLOR-CON M) extended release tablet 40 mEq  40 mEq Oral PRN    Or    potassium bicarb-citric acid (EFFER-K) effervescent tablet 40 mEq  40 mEq Oral PRN    Or    potassium chloride 10 mEq/100 mL IVPB (Peripheral Line)  10 mEq IntraVENous PRN    potassium 
patient's response to treatment in order to develop a plan of care.     TREATMENT:   Therapeutic Activity (23 Minutes): Therapeutic activity included Supine to Sit, Scooting, Transfer Training, Ambulation on level ground, Sitting balance , and Standing balance to improve functional Activity tolerance, Balance, Coordination, Mobility, Strength, and ROM.    TREATMENT GRID:  N/A    AFTER TREATMENT PRECAUTIONS: Alarm Activated, Bed/Chair Locked, Call light within reach, Chair, and Needs within reach    INTERDISCIPLINARY COLLABORATION:  RN/ PCT, PT/ PTA, and OT/ LUCERO    EDUCATION:      TIME IN/OUT:  Time In: 1020  Time Out: 1045  Minutes: 25    Tony Verma PT

## 2024-11-29 NOTE — PLAN OF CARE
Problem: Pain  Goal: Verbalizes/displays adequate comfort level or baseline comfort level  11/27/2024 0945 by Regina Quinonez RN  Outcome: Progressing  11/26/2024 2126 by Sheela Saldivar, RN  Outcome: Progressing     Problem: Safety - Adult  Goal: Free from fall injury  11/27/2024 0945 by Regina Quinonez RN  Outcome: Progressing  11/26/2024 2126 by Sheela Saldivar, RN  Outcome: Progressing     Problem: Discharge Planning  Goal: Discharge to home or other facility with appropriate resources  11/27/2024 0945 by Regina Quinonez RN  Outcome: Progressing  11/26/2024 2126 by Sheela Saldivar RN  Outcome: Progressing     Problem: ABCDS Injury Assessment  Goal: Absence of physical injury  11/27/2024 0945 by Regina Quinonez RN  Outcome: Progressing  11/26/2024 2126 by Sheela Saldivar RN  Outcome: Progressing     Problem: Cardiovascular - Adult  Goal: Maintains optimal cardiac output and hemodynamic stability  11/27/2024 0945 by Regina Quinonez RN  Outcome: Progressing  11/26/2024 2126 by Sheela Saldivar, RN  Outcome: Progressing  Goal: Absence of cardiac dysrhythmias or at baseline  11/27/2024 0945 by Regina Quinonez RN  Outcome: Progressing  11/26/2024 2126 by Sheela Saldivar RN  Outcome: Progressing     Problem: Skin/Tissue Integrity - Adult  Goal: Skin integrity remains intact  11/27/2024 0945 by Regina Quinonez RN  Outcome: Progressing  11/26/2024 2126 by Sheela Saldivar, RN  Outcome: Progressing  Goal: Incisions, wounds, or drain sites healing without S/S of infection  11/27/2024 0945 by Regina Quinonez RN  Outcome: Progressing  11/26/2024 2126 by Sheela Saldivar, RN  Outcome: Progressing  Goal: Oral mucous membranes remain intact  11/27/2024 0945 by Regina Quinonez RN  Outcome: Progressing  11/26/2024 2126 by Sheela Saldivar, RN  Outcome: Progressing     Problem: 
  Problem: Pain  Goal: Verbalizes/displays adequate comfort level or baseline comfort level  11/27/2024 2115 by Roslyn Chaney RN  Outcome: Progressing  Flowsheets (Taken 11/27/2024 2022)  Verbalizes/displays adequate comfort level or baseline comfort level:   Encourage patient to monitor pain and request assistance   Assess pain using appropriate pain scale   Administer analgesics based on type and severity of pain and evaluate response  11/27/2024 0945 by Regina Quinonez RN  Outcome: Progressing     Problem: Safety - Adult  Goal: Free from fall injury  11/27/2024 2115 by Roslyn Chaney RN  Outcome: Progressing  Flowsheets (Taken 11/27/2024 2022)  Free From Fall Injury: Instruct family/caregiver on patient safety  11/27/2024 0945 by Regina Quinonez RN  Outcome: Progressing     Problem: Discharge Planning  Goal: Discharge to home or other facility with appropriate resources  11/27/2024 2115 by Roslyn Chaney RN  Outcome: Progressing  Flowsheets (Taken 11/27/2024 2022)  Discharge to home or other facility with appropriate resources:   Identify barriers to discharge with patient and caregiver   Arrange for needed discharge resources and transportation as appropriate   Identify discharge learning needs (meds, wound care, etc)  11/27/2024 0945 by Regina Quinonez RN  Outcome: Progressing     Problem: ABCDS Injury Assessment  Goal: Absence of physical injury  11/27/2024 2115 by Roslyn Chaney RN  Outcome: Progressing  Flowsheets (Taken 11/27/2024 2022)  Absence of Physical Injury: Implement safety measures based on patient assessment  11/27/2024 0945 by Regina Quinonez RN  Outcome: Progressing     Problem: Cardiovascular - Adult  Goal: Maintains optimal cardiac output and hemodynamic stability  11/27/2024 2115 by Roslyn Chaney RN  Outcome: Progressing  Flowsheets (Taken 11/27/2024 2022)  Maintains optimal cardiac output and hemodynamic stability:   Monitor blood pressure and heart rate   Monitor 
  Problem: Pain  Goal: Verbalizes/displays adequate comfort level or baseline comfort level  Outcome: Progressing     Problem: Safety - Adult  Goal: Free from fall injury  Outcome: Progressing     Problem: Discharge Planning  Goal: Discharge to home or other facility with appropriate resources  Outcome: Progressing     Problem: ABCDS Injury Assessment  Goal: Absence of physical injury  Outcome: Progressing     Problem: Cardiovascular - Adult  Goal: Maintains optimal cardiac output and hemodynamic stability  Outcome: Progressing  Goal: Absence of cardiac dysrhythmias or at baseline  Outcome: Progressing     Problem: Skin/Tissue Integrity - Adult  Goal: Skin integrity remains intact  Outcome: Progressing  Goal: Incisions, wounds, or drain sites healing without S/S of infection  Outcome: Progressing  Goal: Oral mucous membranes remain intact  Outcome: Progressing     Problem: Musculoskeletal - Adult  Goal: Return mobility to safest level of function  Outcome: Progressing  Goal: Maintain proper alignment of affected body part  Outcome: Progressing  Goal: Return ADL status to a safe level of function  Outcome: Progressing     Problem: Gastrointestinal - Adult  Goal: Minimal or absence of nausea and vomiting  Outcome: Progressing  Goal: Maintains or returns to baseline bowel function  Outcome: Progressing  Goal: Maintains adequate nutritional intake  Outcome: Progressing     Problem: Metabolic/Fluid and Electrolytes - Adult  Goal: Electrolytes maintained within normal limits  Outcome: Progressing  Goal: Hemodynamic stability and optimal renal function maintained  Outcome: Progressing  Goal: Glucose maintained within prescribed range  Outcome: Progressing     Problem: Hematologic - Adult  Goal: Maintains hematologic stability  Outcome: Progressing     Problem: Neurosensory - Adult  Goal: Achieves stable or improved neurological status  Outcome: Progressing  Goal: Achieves maximal functionality and self care  Outcome: 
  Problem: Pain  Goal: Verbalizes/displays adequate comfort level or baseline comfort level  Outcome: Progressing     Problem: Safety - Adult  Goal: Free from fall injury  Outcome: Progressing  Flowsheets (Taken 11/25/2024 1610 by Jenae Wright RN)  Free From Fall Injury: Instruct family/caregiver on patient safety     Problem: Discharge Planning  Goal: Discharge to home or other facility with appropriate resources  Outcome: Progressing     Problem: ABCDS Injury Assessment  Goal: Absence of physical injury  Outcome: Progressing     Problem: Cardiovascular - Adult  Goal: Maintains optimal cardiac output and hemodynamic stability  Outcome: Progressing  Goal: Absence of cardiac dysrhythmias or at baseline  Outcome: Progressing     Problem: Skin/Tissue Integrity - Adult  Goal: Skin integrity remains intact  Outcome: Progressing  Flowsheets (Taken 11/25/2024 1951)  Skin Integrity Remains Intact:   Monitor for areas of redness and/or skin breakdown   Assess vascular access sites hourly  Goal: Incisions, wounds, or drain sites healing without S/S of infection  Outcome: Progressing  Goal: Oral mucous membranes remain intact  Outcome: Progressing     Problem: Musculoskeletal - Adult  Goal: Return mobility to safest level of function  Outcome: Progressing  Goal: Maintain proper alignment of affected body part  Outcome: Progressing  Goal: Return ADL status to a safe level of function  Outcome: Progressing     Problem: Gastrointestinal - Adult  Goal: Minimal or absence of nausea and vomiting  Outcome: Progressing  Goal: Maintains or returns to baseline bowel function  Outcome: Progressing  Goal: Maintains adequate nutritional intake  Outcome: Progressing     Problem: Metabolic/Fluid and Electrolytes - Adult  Goal: Electrolytes maintained within normal limits  Outcome: Progressing  Goal: Hemodynamic stability and optimal renal function maintained  Outcome: Progressing  Goal: Glucose maintained within prescribed 
  Problem: Pain  Goal: Verbalizes/displays adequate comfort level or baseline comfort level  Outcome: Progressing  Flowsheets (Taken 11/22/2024 1903)  Verbalizes/displays adequate comfort level or baseline comfort level:   Encourage patient to monitor pain and request assistance   Assess pain using appropriate pain scale   Administer analgesics based on type and severity of pain and evaluate response   Implement non-pharmacological measures as appropriate and evaluate response   Consider cultural and social influences on pain and pain management   Notify Licensed Independent Practitioner if interventions unsuccessful or patient reports new pain     Problem: Safety - Adult  Goal: Free from fall injury  Outcome: Progressing  Flowsheets  Taken 11/22/2024 1903 by Vanesa Lewis RN  Free From Fall Injury: Instruct family/caregiver on patient safety  Taken 11/22/2024 1500 by Sarah Ashraf RN  Free From Fall Injury: Instruct family/caregiver on patient safety     Problem: Discharge Planning  Goal: Discharge to home or other facility with appropriate resources  Outcome: Progressing  Flowsheets  Taken 11/22/2024 1903 by Vanesa Lewis RN  Discharge to home or other facility with appropriate resources:   Identify barriers to discharge with patient and caregiver   Arrange for needed discharge resources and transportation as appropriate   Identify discharge learning needs (meds, wound care, etc)   Refer to discharge planning if patient needs post-hospital services based on physician order or complex needs related to functional status, cognitive ability or social support system  Taken 11/22/2024 1500 by Sarah Ashraf, RN  Discharge to home or other facility with appropriate resources:   Identify barriers to discharge with patient and caregiver   Arrange for needed discharge resources and transportation as appropriate   Identify discharge learning needs (meds, wound care, etc)     Problem: ABCDS Injury Assessment  Goal: 
  Problem: Cardiovascular - Adult  Goal: Maintains optimal cardiac output and hemodynamic stability  11/23/2024 2021 by Vanesa Lewis RN  Outcome: Progressing  Flowsheets (Taken 11/23/2024 1906)  Maintains optimal cardiac output and hemodynamic stability:   Monitor blood pressure and heart rate   Monitor urine output and notify Licensed Independent Practitioner for values outside of normal range   Assess for signs of decreased cardiac output   Administer fluid and/or volume expanders as ordered   Administer vasoactive medications as ordered  11/23/2024 0929 by Tesha Becker RN  Outcome: Progressing  Goal: Absence of cardiac dysrhythmias or at baseline  11/23/2024 2021 by Vanesa Lewis RN  Outcome: Progressing  Flowsheets (Taken 11/23/2024 1906)  Absence of cardiac dysrhythmias or at baseline:   Monitor cardiac rate and rhythm   Assess for signs of decreased cardiac output   Administer antiarrhythmia medication and electrolyte replacement as ordered  11/23/2024 0929 by Tesha Becker RN  Outcome: Progressing     Problem: Skin/Tissue Integrity - Adult  Goal: Skin integrity remains intact  11/23/2024 2021 by Vanesa Lewis RN  Outcome: Progressing  Flowsheets (Taken 11/23/2024 1906)  Skin Integrity Remains Intact:   Monitor for areas of redness and/or skin breakdown   Assess vascular access sites hourly  11/23/2024 0929 by Tesha Becker RN  Outcome: Progressing  Goal: Incisions, wounds, or drain sites healing without S/S of infection  11/23/2024 2021 by Vanesa Lewis RN  Outcome: Progressing  Flowsheets (Taken 11/23/2024 1906)  Incisions, Wounds, or Drain Sites Healing Without Sign and Symptoms of Infection:   ADMISSION and DAILY: Assess and document risk factors for pressure ulcer development   TWICE DAILY: Assess and document skin integrity   Implement wound care per orders   Initiate pressure ulcer prevention bundle as indicated  11/23/2024 0929 by Tesha Becker RN  Outcome: Progressing  Goal: Oral 
medication as ordered   Assess activities of daily living deficits and provide assistive devices as needed     Problem: Gastrointestinal - Adult  Goal: Minimal or absence of nausea and vomiting  Outcome: Progressing  Flowsheets (Taken 11/28/2024 0837 by Jenae Wright, RN)  Minimal or absence of nausea and vomiting:   Administer IV fluids as ordered to ensure adequate hydration   Maintain NPO status until nausea and vomiting are resolved   Nasogastric tube to low intermittent suction as ordered  Goal: Maintains or returns to baseline bowel function  Outcome: Progressing  Flowsheets (Taken 11/28/2024 0837 by Jenae Wright, RN)  Maintains or returns to baseline bowel function:   Assess bowel function   Encourage oral fluids to ensure adequate hydration   Administer IV fluids as ordered to ensure adequate hydration  Goal: Maintains adequate nutritional intake  Outcome: Progressing  Flowsheets (Taken 11/28/2024 0837 by Jenae Wright, RN)  Maintains adequate nutritional intake:   Monitor percentage of each meal consumed   Identify factors contributing to decreased intake, treat as appropriate

## 2024-11-29 NOTE — CARE COORDINATION
Discharge order is in. Pt is discharging today in stable condition and transferring to [facility],  #3. [facility] scheduled for 1130. Report: [phone]. Pt agrees with dcp. No other discharge needs identified by CM. Tx goals met.       11/29/24 0916   Services At/After Discharge   Transition of Care Consult (CM Consult) Discharge Planning;SNF  ([facility], #3)   Partner SNF Yes   Services At/After Discharge Skilled Nursing Facility (SNF);In ambulance   [place] Resource Information Provided? No   Mode of Transport at Discharge [facility] Transport Time of Discharge 1130   Confirm Follow Up Transport Family   Condition of Participation: Discharge Planning   The Patient and/or Patient Representative was provided with a Choice of Provider? Patient   The Patient and/Or Patient Representative agree with the Discharge Plan? Yes   Freedom of Choice list was provided with basic dialogue that supports the patient's individualized plan of care/goals, treatment preferences, and shares the quality data associated with the providers?  Yes

## 2024-11-29 NOTE — DISCHARGE SUMMARY
Hospitalist Discharge Summary   Admit Date:  2024  3:07 AM   DC Note date: 2024  Name:  Brendon Tena   Age:  79 y.o.  Sex:  male  :  1945   MRN:  [id number]   [place]  [facility] [place]  PCP:  Sandeep Arnold MD    Presenting Complaint: Fall and Hip Pain     Initial Admission Diagnosis: Closed fracture of neck of right femur, initial encounter ([facility]) [S72.001A]  Fall, initial encounter [W19.XXXA]  Subcapital fracture of femur, right, closed, initial encounter ([facility]) [S72.011A]  Displaced fracture of right femoral neck ([facility]) [S72.001A]     Problem List for this Hospitalization (present on admission):    Principal Problem (Resolved):    Subcapital fracture of femur, right, closed, initial encounter ([facility])  Active Problems:    PSVT (paroxysmal supraventricular tachycardia) ([facility])    Essential hypertension    Stage 3a chronic kidney disease ([facility])    Gait abnormality    Decreased activities of daily living (ADL)    Secondary hypercoagulability disorder ([facility])    Hyponatremia  Resolved Problems:    Closed fracture of neck of right femur ([facility])    Acute pain      Hospital Course:  Brendon Tena is a 79 y.o. male with medical history of elevated PSA, atherosclerotic coronary vascular disease, depression, GERD, aortic ectasia, hypertension, CKD 3A, hyperlipidemia, HLA-B27 positive and MGUS who presented after a fall at home.  On presentation imaging showed right subcapital femur fracture.  Patient was admitted for further management.  Ortho consulted and patient is status post surgical repair on .  Patient noted to go into SVT and also bradycardia down.  Cardiology consulted for further management.  He required IV diltiazem with rates eventually stabilizing and controlled on oral metoprolol.     His subcapital femoral fracture was fixed with hemiarthroplasty on .  Perioperative antibiotics wound care and DVT prophylaxis were guided by orthopedics.  He worked well with physical and Occupational Therapy

## 2024-12-02 ENCOUNTER — CARE COORDINATION (OUTPATIENT)
Dept: CARE COORDINATION | Facility: CLINIC | Age: 79
End: 2024-12-02

## 2024-12-02 NOTE — CARE COORDINATION
Transition of care outreach postponed for 7 days due to patient's discharge to [place] for short term rehab.

## 2024-12-09 ENCOUNTER — CARE COORDINATION (OUTPATIENT)
Dept: CARE COORDINATION | Facility: CLINIC | Age: 79
End: 2024-12-09

## 2024-12-09 NOTE — CARE COORDINATION
Care Transitions Post-Acute Facility Update Call    2024    Patient: Brendon Tena Patient : 1945   MRN: [id number]  Reason for Admission: Fracture of right Femur.  Discharge Date: 24 RARS: Readmission Risk Score: 13.4  Spoke with Regina staff @ Rehab, [facility] patient d/c this morning. CTN notified.       Care Transitions Post Acute Facility Update    Care Transitions Interventions      Post Acute Facility Update         Nursing       Rehab/Functional       SW/Discharge Planning

## 2024-12-10 ENCOUNTER — CARE COORDINATION (OUTPATIENT)
Dept: CARE COORDINATION | Facility: CLINIC | Age: 79
End: 2024-12-10

## 2024-12-10 NOTE — CARE COORDINATION
Care Transitions Note    Initial Call - Call within 2 business days of discharge: Yes    Attempted to reach patient for transitions of care follow up. Unable to reach patient.    Outreach Attempts:   Unsuccessful attempt at [facility] call    Patient: Brnedon Tena    Patient : 1945   MRN: [id number]    Reason for Admission: fall, fracture  Discharge Date: 24  RURS: Readmission Risk Score: 13.4    Last Discharge Facility       Date Complaint Diagnosis Description Type Department Provider    24 Fall; Hip Pain Closed fracture of neck of right femur, initial encounter (HCC) ... ED to Hosp-Admission (Discharged) (ADMITTED) [id number] Les Calzada DO; Odette Walker...            Was this an external facility discharge? Yes. Discharge Date: . Facility Name: [facility]    Follow Up Appointment:   Patient has hospital follow up appointment scheduled within 7 days of discharge.    Future Appointments         Provider Specialty Dept Phone    2024 1:15 PM Reed Bean MD Orthopedic Surgery [phone]    2025 9:45 AM Lalito Almaraz MD Cardiology [phone]    3/6/2025 12:50 PM PERIPHER[facility] [phone]    3/6/2025 1:45 PM Doni Dee MD Oncology [phone]    3/11/2025 10:40 AM Sandeep Arnold MD Internal Medicine [phone]    2025 10:00 AM (Arrive by 9:45 AM) Evelia Forman MD Rheumatology [phone]            Plan for follow-up on next business day.      Slim Snow RN

## 2024-12-11 ENCOUNTER — CARE COORDINATION (OUTPATIENT)
Dept: CARE COORDINATION | Facility: CLINIC | Age: 79
End: 2024-12-11

## 2024-12-11 NOTE — CARE COORDINATION
Care Transitions Note    Initial Call - Call within 2 business days of discharge: Yes    Patient Current Location:  Home:  [address] [address]  [facility] [address]    Care Transition Nurse contacted the patient by telephone to perform post hospital discharge assessment, verified name and  as identifiers. Provided introduction to self, and explanation of the Care Transition Nurse role.     Patient: Brendon Tena    Patient : 1945   MRN: [id number]    Reason for Admission: fall, fracture  Discharge Date: 24  RURS: Readmission Risk Score: 13.4      Last Discharge Facility       Date Complaint Diagnosis Description Type Department Provider    24 Fall; Hip Pain Closed fracture of neck of right femur, initial encounter ([facility]) ... ED to Hosp-Admission (Discharged) (ADMITTED) [id number] Mike DO; Odette Walker...            Was this an external facility discharge? Yes. Discharge Date: . Facility Name: [facility]    Additional needs identified to be addressed with provider   No needs identified             Method of communication with provider: none.    Patients top risk factors for readmission: medical condition-recent fall, fracture    Interventions to address risk factors:   Review of patient management of conditions/medications: review POC after SNF stay, follow up appt with orthopedics    Care Summary Note: Patient home and doing well after recent d/c from SNF for short term rehab. Patient denies any new or worsening s/s and pain has been manageable in which he refers 'just some soreness' Patient made progress in SNF and is home and denies any concerns at this time. Patient has been set up with orthopedics and  for SOC today.    Care Transition Nurse reviewed discharge instructions, medical action plan, and red flags with patient. The patient was given an opportunity to ask questions; all questions answered at this time.. The patient verbalized understanding.   Were discharge

## 2024-12-12 ENCOUNTER — TELEPHONE (OUTPATIENT)
Dept: ORTHOPEDIC SURGERY | Age: 79
End: 2024-12-12

## 2024-12-12 DIAGNOSIS — F32.5 MAJOR DEPRESSIVE DISORDER WITH SINGLE EPISODE, IN FULL REMISSION (HCC): ICD-10-CM

## 2024-12-12 DIAGNOSIS — J30.9 ALLERGIC RHINITIS, UNSPECIFIED SEASONALITY, UNSPECIFIED TRIGGER: ICD-10-CM

## 2024-12-12 RX ORDER — MONTELUKAST SODIUM 10 MG/1
10 TABLET ORAL DAILY
Qty: 100 TABLET | Refills: 0 | Status: SHIPPED | OUTPATIENT
Start: 2024-12-12

## 2024-12-12 RX ORDER — BUPROPION HYDROCHLORIDE 150 MG/1
150 TABLET ORAL EVERY MORNING
Qty: 100 TABLET | Refills: 0 | Status: SHIPPED | OUTPATIENT
Start: 2024-12-12

## 2024-12-12 NOTE — TELEPHONE ENCOUNTER
Returned phone call. [facility] sign [facility] health for any ortho needs and dry dressing change daily prn.

## 2024-12-12 NOTE — TELEPHONE ENCOUNTER
She wants to confirm that Dr. Bean will sign home health orders. She also wants dressing change or wound care orders. Please give her a call.

## 2024-12-18 ENCOUNTER — OFFICE VISIT (OUTPATIENT)
Dept: ORTHOPEDIC SURGERY | Age: 79
End: 2024-12-18

## 2024-12-18 DIAGNOSIS — S72.001D CLOSED FRACTURE OF NECK OF RIGHT FEMUR WITH ROUTINE HEALING, SUBSEQUENT ENCOUNTER: Primary | ICD-10-CM

## 2024-12-18 PROCEDURE — 99024 POSTOP FOLLOW-UP VISIT: CPT | Performed by: ORTHOPAEDIC SURGERY

## 2024-12-18 NOTE — PROGRESS NOTES
[facility] Note    Name: Brendon Tena  YOB: 1945  Gender: male  MRN: [id number]  PCP:    HPI:   Brendon Tena is a 79 y.o. male who had a mechanical fall on 11/22/2024 sustaining right femoral neck fracture  -Sig PMH: Aortic ectasia, CAD, HTN, CKD3, GERD, MGUS    11/22/2024: Right hip hemiarthroplasty  Discharged from hospital same day    [facility] 12[facility]  Currently living with his Sister  Has home PT/OT  States he is making good progress with ambulation  No right hip pain    Moving to [place] in Mid-January    ROS/Meds/PSH/PMH/FH/SH:  Pertinent details discussed in HPI    Physical Examination:  General:  Awake and conversive, no distress, well nourished, age-appropriate.  Neurological: A&O x 3, normal coordination and tone.  Psych: Normal mood, affect and behavior. Normal thought content and judgment. Cooperative w/ exam.  Cardiovascular: RRR, + palpable pulses b/l upper extremity  Pulmonary: Chest excursion equal bilaterally, breathing non-labored    Musculoskeletal Exam:  Right lower extremity  - Skin: Incision healing well with no evidence of infection  - Normal alignment  - ROM: Hip range of motion within normal limits  - Sensation: SILT Sa/Cerna/T/SP/DP   - Motor: 5/5 TA/EHL/FHL/GS   - Digits warm, well-perfused, brisk cap refill    Gait:     Imaging:   I personally reviewed and interpreted:  -12/18/2024: AP pelvis, AP/lateral views right hip status post press-fit bipolar hemiarthroplasty demonstrates appropriate sized implants with leg lengths equal bilaterally.          Assessment:     ICD-10-CM    1. Closed fracture of neck of right femur with routine healing, subsequent encounter  S72.001D XR HIP 2-3 VW W PELVIS RIGHT          Plan:   Overall doing well    Imaging was reviewed with the patient     Continue weightbearing as tolerated alert right lower extremity  Continue home physical therapy targeting functional mobilization  Would be

## 2024-12-19 ENCOUNTER — CARE COORDINATION (OUTPATIENT)
Dept: CARE COORDINATION | Facility: CLINIC | Age: 79
End: 2024-12-19

## 2024-12-19 NOTE — CARE COORDINATION
Care Transitions Note    Follow Up Call     Patient Current Location:  Home: [address] [address]  [facility] [address]    Care Transition Nurse contacted the patient by telephone. Verified name and  as identifiers.    Additional needs identified to be addressed with provider   Patient encouraged to call PCP for follow up or consultation for his recent bialteral ankle swelling and tingling to legs at night                 Method of communication with provider: none.    Care Summary Note: Patient indicating he is recovering well from surgery and had orthopedic follow up for staple removal. Patient does however endorse bilateral LE swelling and some tingling/itching which he recently saw urgent care for. He was prescribed steroid and antihistamine for this which mildly helped. Patient was encouraged to follow up with PCP for this issue. Patient denies any further issues associated and will contact PCP for follow up on the matter.    Care Transition Nurse reviewed discharge instructions, medical action plan, and red flags with patient. The patient was given an opportunity to ask questions; all questions answered at this time.. The patient verbalized understanding.   Were discharge instructions available to patient? Yes.   Reviewed appropriate site of care based on symptoms and resources available to patient including: PCP. The patient agrees to contact the primary care provider and/or specialist office for questions related to their healthcare.      Advance Care Planning:   Does patient have an Advance Directive: patient declined education.    Medication Review:  Medications changed since last call, reviewed today.     Remote Patient Monitoring:  Offered patient enrollment in the Remote Patient Monitoring (RPM) program for in-home monitoring: Patient is not eligible for RPM program because: patient does not have qualifying diagnosis.    Assessments:  Care Transitions Subsequent and Final Call    Subsequent and Final

## 2024-12-19 NOTE — TELEPHONE ENCOUNTER
MEDICATION REFILL REQUEST    Needs this or [facility] home health states doesn't have that much?      Name of Medication:  eliquis    Dose:    Frequency:    Quantity:    Days' supply:            Pharmacy Name/Location:  [id number] - [place] - [address] - P [phone] - F [phone]  [address] [address]  Phone: [phone]  Fax: [phone]

## 2024-12-23 NOTE — TELEPHONE ENCOUNTER
Requested Prescriptions     Pending Prescriptions Disp Refills    apixaban (ELIQUIS) 5 MG TABS tablet 60 tablet 1     Sig: Take 1 tablet by mouth 2 times daily

## 2024-12-31 ENCOUNTER — TELEPHONE (OUTPATIENT)
Dept: ORTHOPEDIC SURGERY | Age: 79
End: 2024-12-31

## 2024-12-31 DIAGNOSIS — S72.001D CLOSED FRACTURE OF NECK OF RIGHT FEMUR WITH ROUTINE HEALING, SUBSEQUENT ENCOUNTER: Primary | ICD-10-CM

## 2024-12-31 NOTE — TELEPHONE ENCOUNTER
Yee is calling to see if he can be referred to a different home health agency. He is starting new insurance and [facility] and also [facility] is in network.

## 2025-01-06 ENCOUNTER — OFFICE VISIT (OUTPATIENT)
Dept: INTERNAL MEDICINE CLINIC | Facility: CLINIC | Age: 80
End: 2025-01-06

## 2025-01-06 VITALS
OXYGEN SATURATION: 98 % | HEIGHT: 69 IN | TEMPERATURE: 98.1 F | SYSTOLIC BLOOD PRESSURE: 135 MMHG | WEIGHT: 159 LBS | DIASTOLIC BLOOD PRESSURE: 73 MMHG | BODY MASS INDEX: 23.55 KG/M2 | HEART RATE: 61 BPM

## 2025-01-06 DIAGNOSIS — N18.31 STAGE 3A CHRONIC KIDNEY DISEASE (HCC): ICD-10-CM

## 2025-01-06 DIAGNOSIS — I77.819 AORTIC ECTASIA (HCC): ICD-10-CM

## 2025-01-06 DIAGNOSIS — D68.69 SECONDARY HYPERCOAGULABILITY DISORDER (HCC): ICD-10-CM

## 2025-01-06 DIAGNOSIS — I25.10 ASCVD (ARTERIOSCLEROTIC CARDIOVASCULAR DISEASE): ICD-10-CM

## 2025-01-06 DIAGNOSIS — I47.10 PSVT (PAROXYSMAL SUPRAVENTRICULAR TACHYCARDIA) (HCC): ICD-10-CM

## 2025-01-06 DIAGNOSIS — I48.0 PAROXYSMAL ATRIAL FIBRILLATION (HCC): ICD-10-CM

## 2025-01-06 DIAGNOSIS — F32.5 MAJOR DEPRESSIVE DISORDER WITH SINGLE EPISODE, IN FULL REMISSION (HCC): ICD-10-CM

## 2025-01-06 DIAGNOSIS — Z79.899 ENCOUNTER FOR LONG-TERM CURRENT USE OF MEDICATION: ICD-10-CM

## 2025-01-06 DIAGNOSIS — I10 ESSENTIAL HYPERTENSION: ICD-10-CM

## 2025-01-06 DIAGNOSIS — R20.0 NUMBNESS: ICD-10-CM

## 2025-01-06 DIAGNOSIS — E78.2 MIXED HYPERLIPIDEMIA: ICD-10-CM

## 2025-01-06 DIAGNOSIS — G45.3 AMAUROSIS FUGAX: ICD-10-CM

## 2025-01-06 DIAGNOSIS — R73.03 PREDIABETES: ICD-10-CM

## 2025-01-06 DIAGNOSIS — I25.10 ASCVD (ARTERIOSCLEROTIC CARDIOVASCULAR DISEASE): Primary | ICD-10-CM

## 2025-01-06 DIAGNOSIS — J30.9 ALLERGIC RHINITIS, UNSPECIFIED SEASONALITY, UNSPECIFIED TRIGGER: ICD-10-CM

## 2025-01-06 LAB
ALBUMIN SERPL-MCNC: 3.3 G/DL (ref 3.2–4.6)
ALBUMIN/GLOB SERPL: 0.9 (ref 1–1.9)
ALP SERPL-CCNC: 107 U/L (ref 40–129)
ALT SERPL-CCNC: 13 U/L (ref 8–55)
ANION GAP SERPL CALC-SCNC: 10 MMOL/L (ref 7–16)
AST SERPL-CCNC: 16 U/L (ref 15–37)
BASOPHILS # BLD: 0.1 K/UL (ref 0–0.2)
BASOPHILS NFR BLD: 1 % (ref 0–2)
BILIRUB DIRECT SERPL-MCNC: <0.2 MG/DL (ref 0–0.4)
BILIRUB SERPL-MCNC: 0.4 MG/DL (ref 0–1.2)
BUN SERPL-MCNC: 15 MG/DL (ref 8–23)
CALCIUM SERPL-MCNC: 9.5 MG/DL (ref 8.8–10.2)
CHLORIDE SERPL-SCNC: 103 MMOL/L (ref 98–107)
CO2 SERPL-SCNC: 28 MMOL/L (ref 20–29)
CREAT SERPL-MCNC: 1.31 MG/DL (ref 0.8–1.3)
CRP SERPL-MCNC: 0.3 MG/DL (ref 0–0.4)
DIFFERENTIAL METHOD BLD: ABNORMAL
EOSINOPHIL # BLD: 0.1 K/UL (ref 0–0.8)
EOSINOPHIL NFR BLD: 2 % (ref 0.5–7.8)
ERYTHROCYTE [DISTWIDTH] IN BLOOD BY AUTOMATED COUNT: 14.5 % (ref 11.9–14.6)
ERYTHROCYTE [SEDIMENTATION RATE] IN BLOOD: 6 MM/HR
GLOBULIN SER CALC-MCNC: 3.5 G/DL (ref 2.3–3.5)
GLUCOSE SERPL-MCNC: 101 MG/DL (ref 70–99)
HCT VFR BLD AUTO: 36.8 % (ref 41.1–50.3)
HGB BLD-MCNC: 11.4 G/DL (ref 13.6–17.2)
IMM GRANULOCYTES # BLD AUTO: 0 K/UL (ref 0–0.5)
IMM GRANULOCYTES NFR BLD AUTO: 0 % (ref 0–5)
LYMPHOCYTES # BLD: 1.9 K/UL (ref 0.5–4.6)
LYMPHOCYTES NFR BLD: 21 % (ref 13–44)
MCH RBC QN AUTO: 29.5 PG (ref 26.1–32.9)
MCHC RBC AUTO-ENTMCNC: 31 G/DL (ref 31.4–35)
MCV RBC AUTO: 95.3 FL (ref 82–102)
MONOCYTES # BLD: 1 K/UL (ref 0.1–1.3)
MONOCYTES NFR BLD: 11 % (ref 4–12)
NEUTS SEG # BLD: 6.1 K/UL (ref 1.7–8.2)
NEUTS SEG NFR BLD: 65 % (ref 43–78)
NRBC # BLD: 0 K/UL (ref 0–0.2)
PLATELET # BLD AUTO: 313 K/UL (ref 150–450)
PMV BLD AUTO: 10.9 FL (ref 9.4–12.3)
POTASSIUM SERPL-SCNC: 4.6 MMOL/L (ref 3.5–5.1)
PROT SERPL-MCNC: 6.8 G/DL (ref 6.3–8.2)
RBC # BLD AUTO: 3.86 M/UL (ref 4.23–5.6)
SODIUM SERPL-SCNC: 141 MMOL/L (ref 136–145)
VIT B12 SERPL-MCNC: 644 PG/ML (ref 193–986)
WBC # BLD AUTO: 9.2 K/UL (ref 4.3–11.1)

## 2025-01-06 PROCEDURE — 99214 OFFICE O/P EST MOD 30 MIN: CPT | Performed by: INTERNAL MEDICINE

## 2025-01-06 PROCEDURE — 3075F SYST BP GE 130 - 139MM HG: CPT | Performed by: INTERNAL MEDICINE

## 2025-01-06 PROCEDURE — 1123F ACP DISCUSS/DSCN MKR DOCD: CPT | Performed by: INTERNAL MEDICINE

## 2025-01-06 PROCEDURE — 3078F DIAST BP <80 MM HG: CPT | Performed by: INTERNAL MEDICINE

## 2025-01-06 RX ORDER — MONTELUKAST SODIUM 10 MG/1
10 TABLET ORAL DAILY
Qty: 90 TABLET | Refills: 1 | Status: SHIPPED | OUTPATIENT
Start: 2025-01-06

## 2025-01-06 RX ORDER — BUPROPION HYDROCHLORIDE 150 MG/1
150 TABLET ORAL EVERY MORNING
Qty: 90 TABLET | Refills: 1 | Status: SHIPPED | OUTPATIENT
Start: 2025-01-06

## 2025-01-06 ASSESSMENT — PATIENT HEALTH QUESTIONNAIRE - PHQ9
2. FEELING DOWN, DEPRESSED OR HOPELESS: NOT AT ALL
SUM OF ALL RESPONSES TO PHQ QUESTIONS 1-9: 0
SUM OF ALL RESPONSES TO PHQ QUESTIONS 1-9: 0
7. TROUBLE CONCENTRATING ON THINGS, SUCH AS READING THE NEWSPAPER OR WATCHING TELEVISION: NOT AT ALL
6. FEELING BAD ABOUT YOURSELF - OR THAT YOU ARE A FAILURE OR HAVE LET YOURSELF OR YOUR FAMILY DOWN: NOT AT ALL
SUM OF ALL RESPONSES TO PHQ9 QUESTIONS 1 & 2: 0
3. TROUBLE FALLING OR STAYING ASLEEP: NOT AT ALL
9. THOUGHTS THAT YOU WOULD BE BETTER OFF DEAD, OR OF HURTING YOURSELF: NOT AT ALL
4. FEELING TIRED OR HAVING LITTLE ENERGY: NOT AT ALL
10. IF YOU CHECKED OFF ANY PROBLEMS, HOW DIFFICULT HAVE THESE PROBLEMS MADE IT FOR YOU TO DO YOUR WORK, TAKE CARE OF THINGS AT HOME, OR GET ALONG WITH OTHER PEOPLE: NOT DIFFICULT AT ALL
1. LITTLE INTEREST OR PLEASURE IN DOING THINGS: NOT AT ALL
5. POOR APPETITE OR OVEREATING: NOT AT ALL
SUM OF ALL RESPONSES TO PHQ QUESTIONS 1-9: 0
8. MOVING OR SPEAKING SO SLOWLY THAT OTHER PEOPLE COULD HAVE NOTICED. OR THE OPPOSITE, BEING SO FIGETY OR RESTLESS THAT YOU HAVE BEEN MOVING AROUND A LOT MORE THAN USUAL: NOT AT ALL
SUM OF ALL RESPONSES TO PHQ QUESTIONS 1-9: 0

## 2025-01-06 NOTE — PROGRESS NOTES
Sandeep Arnold M.D[place] [address]  Phone: [phone] Fax: [phone]    Hypertension  This is a chronic problem.     Brendon Tena is a 79 y.o. White (non-) male.     Current Outpatient Medications   Medication Sig Dispense Refill    apixaban (ELIQUIS) 5 MG TABS tablet Take 1 tablet by mouth 2 times daily 60 tablet 1    montelukast (SINGULAIR) 10 MG tablet Take 1 tablet by mouth daily 100 tablet 0    buPROPion (WELLBUTRIN XL) 150 MG extended release tablet Take 1 tablet by mouth every morning 100 tablet 0    metoprolol succinate (TOPROL XL) 25 MG extended release tablet Take 2 tablets by mouth daily 90 tablet 3    lisinopril (PRINIVIL;ZESTRIL) 10 MG tablet Take 1 tablet by mouth daily 30 tablet 3    nitroGLYCERIN (NITROSTAT) 0.4 MG SL tablet DISSOLVE 1 TABLET UNDER THE TONGUE EVERY 5 MINUTES AS NEEDED FOR CHEST PAIN 25 tablet 0    atorvastatin (LIPITOR) 80 MG tablet Take 1 tablet by mouth daily 90 tablet 3    loratadine (CLARITIN) 10 MG capsule Take 1 capsule by mouth daily      lansoprazole (PREVACID) 30 MG delayed release capsule       aspirin 81 MG EC tablet Take by mouth daily      vitamin D (CHOLECALCIFEROL) 25 MCG (1000 UT) TABS tablet Take by mouth daily       No current facility-administered medications for this visit.     No Known Allergies    Past Medical History:   Diagnosis Date    CAD (coronary artery disease)     Dr Gunter x 2    CKD (chronic kidney disease)     Depression     Esophageal ring     s/p dilation Nov 2020    GERD (gastroesophageal reflux disease)     HLD (hyperlipidemia)     HTN (hypertension)     Ill-defined condition     VASOVAGAL SYNCOPE episode x 3    MGUS (monoclonal gammopathy of unknown significance)      Past Surgical History:   Procedure Laterality Date    COLONOSCOPY      HIP SURGERY Right 11/22/2024    HIP HEMIARTHROPLASTY performed by Reed Bean MD at [facility] MAIN OR    OTHER SURGICAL

## 2025-01-06 NOTE — ACP (ADVANCE CARE PLANNING)
Advance Care Planning   The patient has the following advanced directives on file:  Advance Directives       Power of  Living Will ACP-Advance Directive ACP-Power of     Not on File Not on File Not on File Not on File            The patient has appointed the following active healthcare agents:    Primary Decision Maker: Jessica Walls Chava Mcmanus - Child - [phone]    Secondary Decision Maker: Taurus Tenaam - Child - [phone]    Supplemental (Other) Decision Maker: Alessia Valenzuela - Other - [phone]    The Patient has the following current code status:    Code Status: Prior    Visit Documentation:  I discussed Advance Care Planning with Brendon Tena today which included the importance of making their choices for care and treatment in the case of a health event that adversely affects their decision-making abilities. He has not completed the Advance Care Directives. He does not have an active health care agent at this time.  Brendonbia Tena was encouraged to complete the declaration forms and provide a signed copy of his medical records.   I advised patient we would continue this discussion at future visits.     Catarina Stafford  1/6/2025

## 2025-01-07 ENCOUNTER — CARE COORDINATION (OUTPATIENT)
Dept: CARE COORDINATION | Facility: CLINIC | Age: 80
End: 2025-01-07

## 2025-01-07 NOTE — CARE COORDINATION
Care Transitions Note    Follow Up Call     Attempted to reach patient for transitions of care follow up.  Unable to reach patient.      Outreach Attempts:   HIPAA compliant voicemail left for patient.     Care Summary Note: According to Epic notes attended PCP appointment 1/6/2025    Follow Up Appointment:   Future Appointments         Provider Specialty Dept Phone    1/15/2025 12:30 PM (Arrive by 12:15 PM) Reed Bean MD Orthopedic Surgery [phone]    1/20/2025 9:45 AM Lalito Almaraz MD Cardiology [phone]    3/6/2025 12:50 PM PERIPHERAL Lab [phone]    3/6/2025 1:45 PM Doni Dee MD Oncology [phone]    3/11/2025 10:40 AM Sandeep Arnold MD Internal Medicine [phone]    7/9/2025 10:00 AM (Arrive by 9:45 AM) Evelia Forman MD Rheumatology [phone]            Plan for follow-up on next business day.  based on severity of symptoms and risk factors. Plan for next call:  No concerns graduate from [facility] program    Danna Huff LPN

## 2025-01-08 ENCOUNTER — CARE COORDINATION (OUTPATIENT)
Dept: CARE COORDINATION | Facility: CLINIC | Age: 80
End: 2025-01-08

## 2025-01-08 NOTE — CARE COORDINATION
Care Transitions Note    Final Call     2nd Attempted to reach patient for transitions of care follow up.  Unable to reach patient.  Will re-open if phone call is returned.    Outreach Attempts:   HIPAA compliant voicemail left for patient.     Patient closed (unable to reach patient) from the Care Transitions program on 1/8/2025.  Patient/family  Unable to reach .      Handoff:   Patient was not referred to the ACM team due to unable to contact patient.      Care Summary Note: According to Epic notes patient attended PCP appointment 1/6/2025    Assessments:  Unable to reach    Upcoming Appointments:    Future Appointments         Provider Specialty Dept Phone    1/15/2025 12:30 PM (Arrive by 12:15 PM) Reed Bean MD Orthopedic Surgery [phone]    1/20/2025 9:45 AM Lalito Almaraz MD Cardiology [phone]    3/6/2025 12:50 PM PERIPHERAL Lab [phone]    3/6/2025 1:45 PM Doni Dee MD Oncology [phone]    3/11/2025 10:40 AM Sandeep Arnold MD Internal Medicine [phone]    7/9/2025 10:00 AM (Arrive by 9:45 AM) Evelia Forman MD Rheumatology [phone]            Danna Huff LPN

## 2025-01-14 NOTE — PROGRESS NOTES
[facility] CARDIOLOGY  [address] [place] [address], [address]  [place] [address]  PHONE: [phone]        25        NAME:  Brendon Tena  : 1945  MRN: [id number]     Ascvd,  rca 2016, med cath 2019  Htn  Ckd    CHIEF COMPLAINT:    Coronary Artery Disease and Atrial Fibrillation      SUBJECTIVE:       No chest pain or palpitation or dizziness.  Recent hip sx. Has done well.  + a fib during hosp. stay.  Moving to [place].     Medications were all reviewed with the patient today and updated as necessary.   Current Outpatient Medications   Medication Sig    montelukast (SINGULAIR) 10 MG tablet Take 1 tablet by mouth daily    buPROPion (WELLBUTRIN XL) 150 MG extended release tablet Take 1 tablet by mouth every morning    metoprolol succinate (TOPROL XL) 25 MG extended release tablet Take 2 tablets by mouth daily (Patient taking differently: Take 1 tablet by mouth daily)    nitroGLYCERIN (NITROSTAT) 0.4 MG SL tablet DISSOLVE 1 TABLET UNDER THE TONGUE EVERY 5 MINUTES AS NEEDED FOR CHEST PAIN    atorvastatin (LIPITOR) 80 MG tablet Take 1 tablet by mouth daily    loratadine (CLARITIN) 10 MG capsule Take 1 capsule by mouth daily    lansoprazole (PREVACID) 30 MG delayed release capsule     aspirin 81 MG EC tablet Take by mouth daily    vitamin D (CHOLECALCIFEROL) 25 MCG (1000 UT) TABS tablet Take by mouth daily    apixaban (ELIQUIS) 5 MG TABS tablet Take 1 tablet by mouth 2 times daily    apixaban (ELIQUIS) 5 MG TABS tablet Take 1 tablet by mouth 2 times daily (Patient not taking: Reported on 2025)     No current facility-administered medications for this visit.        No Known Allergies        PHYSICAL EXAM:     Wt Readings from Last 3 Encounters:   25 69.4 kg (153 lb)   25 72.1 kg (159 lb)   24 74.4 kg (164 lb)     BP Readings from Last 3 Encounters:   25 (!) 142/68   25 135/73   24 108/67       BP (!) 142/68   Pulse 72   Ht 1.753 m (5' 9\")   Wt 69.4 kg

## 2025-01-15 ENCOUNTER — OFFICE VISIT (OUTPATIENT)
Dept: ORTHOPEDIC SURGERY | Age: 80
End: 2025-01-15

## 2025-01-15 DIAGNOSIS — S72.001D CLOSED FRACTURE OF NECK OF RIGHT FEMUR WITH ROUTINE HEALING, SUBSEQUENT ENCOUNTER: Primary | ICD-10-CM

## 2025-01-15 PROCEDURE — 99024 POSTOP FOLLOW-UP VISIT: CPT | Performed by: ORTHOPAEDIC SURGERY

## 2025-01-15 NOTE — PROGRESS NOTES
Orthopaedic Trauma Clinic Note    Name: Brendon Tena  YOB: 1945  Gender: male  MRN: [id number]  PCP:    HPI:   Brendon Tena is a 79 y.o. male who had a mechanical fall on 11/22/2024 sustaining right femoral neck fracture  -Sig PMH: Aortic ectasia, CAD, HTN, CKD3, GERD, MGUS    11/22/2024: Right hip hemiarthroplasty  Discharged from hospital same day    [facility]  Currently living with his Sister  Had home PT/OT -this stopped beginning of January as he changes insurance to [employer]  No right hip pain  Has continued his home exercise regimen    Closing on a condo [place] next week    ROS/Meds/PSH/PMH/FH/SH:  Pertinent details discussed in HPI    Physical Examination:  General:  Awake and conversive, no distress, well nourished, age-appropriate.  Neurological: A&O x 3, normal coordination and tone.  Psych: Normal mood, affect and behavior. Normal thought content and judgment. Cooperative w/ exam.  Cardiovascular: RRR, + palpable pulses b/l upper extremity  Pulmonary: Chest excursion equal bilaterally, breathing non-labored    Musculoskeletal Exam:  Right lower extremity  - Skin: Incision healing well with no evidence of infection  - Normal alignment  - ROM: Hip range of motion within normal limits  - Sensation: SILT Sa/Cerna/T/SP/DP   - Motor: 5/5 TA/EHL/FHL/GS   - Digits warm, well-perfused, brisk cap refill    Gait: Normal tandem gait    Imaging:   I personally reviewed and interpreted:  -12/18/2024: AP pelvis, AP/lateral views right hip status post press-fit bipolar hemiarthroplasty demonstrates appropriate sized implants with leg lengths equal bilaterally.        Assessment:     ICD-10-CM    1. Closed fracture of neck of right femur with routine healing, subsequent encounter  S72.001D           Plan:   Overall doing very well  Has made near full functional recovery  Now using a cane for balance    Continue weightbearing as tolerated alert right lower

## 2025-01-20 ENCOUNTER — OFFICE VISIT (OUTPATIENT)
Age: 80
End: 2025-01-20

## 2025-01-20 VITALS
WEIGHT: 153 LBS | HEART RATE: 72 BPM | DIASTOLIC BLOOD PRESSURE: 68 MMHG | BODY MASS INDEX: 22.66 KG/M2 | SYSTOLIC BLOOD PRESSURE: 142 MMHG | HEIGHT: 69 IN

## 2025-01-20 DIAGNOSIS — I25.10 ASCVD (ARTERIOSCLEROTIC CARDIOVASCULAR DISEASE): Primary | ICD-10-CM

## 2025-01-20 DIAGNOSIS — I48.0 PAF (PAROXYSMAL ATRIAL FIBRILLATION) (HCC): ICD-10-CM

## 2025-01-20 PROCEDURE — 99214 OFFICE O/P EST MOD 30 MIN: CPT | Performed by: INTERNAL MEDICINE

## 2025-01-20 PROCEDURE — 3077F SYST BP >= 140 MM HG: CPT | Performed by: INTERNAL MEDICINE

## 2025-01-20 PROCEDURE — 3078F DIAST BP <80 MM HG: CPT | Performed by: INTERNAL MEDICINE

## 2025-01-20 PROCEDURE — 1123F ACP DISCUSS/DSCN MKR DOCD: CPT | Performed by: INTERNAL MEDICINE

## 2025-02-26 DIAGNOSIS — D47.2 MGUS (MONOCLONAL GAMMOPATHY OF UNKNOWN SIGNIFICANCE): Primary | ICD-10-CM

## 2025-02-26 DIAGNOSIS — D64.9 ANEMIA, UNSPECIFIED TYPE: ICD-10-CM

## 2025-02-26 DIAGNOSIS — E55.9 VITAMIN D DEFICIENCY: ICD-10-CM

## 2025-02-27 ENCOUNTER — TELEPHONE (OUTPATIENT)
Dept: ONCOLOGY | Age: 80
End: 2025-02-27

## 2025-04-17 ENCOUNTER — TELEPHONE (OUTPATIENT)
Dept: INTERNAL MEDICINE CLINIC | Facility: CLINIC | Age: 80
End: 2025-04-17

## 2025-04-17 NOTE — TELEPHONE ENCOUNTER
We received home health orders from [facility] home health order # [id number] has been placed on Dr. Arnold desk to be sign.

## (undated) DEVICE — Device

## (undated) DEVICE — SYRINGE IRRIG 60ML SFT PLIABLE BLB EZ TO GRP 1 HND USE W/

## (undated) DEVICE — 3M™ STERI-DRAPE™ U-DRAPE 1015: Brand: STERI-DRAPE™

## (undated) DEVICE — HOOD: Brand: FLYTE

## (undated) DEVICE — SUTURE ABSORBABLE MONOFILAMENT 0 CT-1 36 MM DYED SPRL PDS + SXPP1B450

## (undated) DEVICE — SYRINGE MED 30ML STD CLR PLAS LUERLOCK TIP N CTRL DISP

## (undated) DEVICE — SYRINGE MED 10ML LUERLOCK TIP W/O SFTY DISP

## (undated) DEVICE — HOOD WITH PEEL AWAY FACE SHIELD: Brand: T7PLUS

## (undated) DEVICE — SUTURE VICRYL SZ 2-0 L18IN ABSRB UD CT-1 L36MM 1/2 CIR J839D

## (undated) DEVICE — DRAPE,U/SHT,SPLIT,FILM,60X84,STERILE: Brand: MEDLINE

## (undated) DEVICE — STAPLER, SKIN, 35W, A: Brand: MEDLINE INDUSTRIES, INC.

## (undated) DEVICE — SOLUTION WND IRRIGATION 450 ML 0.5 PVP-I 0.9 NACL

## (undated) DEVICE — BIT DRL L180MM DIA2.5MM G QUIK CPL W/O STP REUSE

## (undated) DEVICE — GOWN,SIRUS,NONRNF,SETINSLV,XL,20/CS: Brand: MEDLINE

## (undated) DEVICE — Z DISCONTINUED NO SUB IDED NEEDLE HYPO 18GA L1.5IN PNK POLYPR HUB S STL SHT BVL STR

## (undated) DEVICE — SOLUTION IRRIG 1000ML STRL H2O USP PLAS POUR BTL

## (undated) DEVICE — BANDAGE COBAN 6 IN WND 6INX5YD FOAM

## (undated) DEVICE — HANDPIECE SET WITH COAXIAL HIGH FLOW TIP AND SUCTION TUBE: Brand: INTERPULSE

## (undated) DEVICE — YANKAUER,BULB TIP,W/O VENT,RIGID,STERILE: Brand: MEDLINE

## (undated) DEVICE — SUTURE VICRYL SZ 2-0 L36IN ABSRB UD L36MM CT-1 1/2 CIR J945H

## (undated) DEVICE — CONTAINER,SPECIMEN,O.R.STRL,4.5OZ: Brand: MEDLINE

## (undated) DEVICE — 3M™ IOBAN™ 2 ANTIMICROBIAL INCISE DRAPE 6651EZ: Brand: IOBAN™ 2

## (undated) DEVICE — MAYO STAND COVER: Brand: CONVERTORS

## (undated) DEVICE — STRYKER PERFORMANCE SERIES SAGITTAL BLADE: Brand: STRYKER PERFORMANCE SERIES

## (undated) DEVICE — SUTURE ETHIBOND EXCEL SZ 5 L30IN NONABSORBABLE GRN L40MM V-37 MB66G

## (undated) DEVICE — BLADE ES L6IN ELASTOMERIC COAT EXT DURABLE BEND UPTO 90DEG

## (undated) DEVICE — POWER CORD: Brand: FLYTE

## (undated) DEVICE — FOAM BUMP ROUND LARGE: Brand: MEDLINE INDUSTRIES, INC.

## (undated) DEVICE — SHEET,DRAPE,53X77,STERILE: Brand: MEDLINE

## (undated) DEVICE — INTENDED TO AID IN THE PASSING OF SUTURES THROUGH BONE AND SOFT TISSUE DURING ORTHOPEDIC SURGERY: Brand: HOFFEE SUTURE RETRIEVER

## (undated) DEVICE — COVER MAYO STAND MAYO 21X18.5X5/8 IN 7.9 LB TORSO ALIBLUE

## (undated) DEVICE — HIP HEMIARTHROPLASTY: Brand: MEDLINE INDUSTRIES, INC.

## (undated) DEVICE — TIBURON HD HIP DRAPE WITH POUCHES: Brand: CONVERTORS

## (undated) DEVICE — DRESSING HYDROFIBER AQUACEL AG ADVANTAGE 3.5X6 IN

## (undated) DEVICE — 3M™ TEGADERM™ TRANSPARENT FILM DRESSING FRAME STYLE, 1626W, 4 IN X 4-3/4 IN (10 CM X 12 CM), 50/CT 4CT/CASE: Brand: 3M™ TEGADERM™

## (undated) DEVICE — NEEDLE HYPO 18GA L1.5IN PNK S STL HUB POLYPR SHLD REG BVL

## (undated) DEVICE — DRAPE,HIP,W/POUCHES,STERILE: Brand: MEDLINE

## (undated) DEVICE — SOLUTION IRRIG 3000ML 0.9% SOD CHL USP UROMATIC PLAS CONT

## (undated) DEVICE — SUTURE MONOCRYL + ABSORBABLE MONOFILAMENT 3-0 PS1 12 IN UD SXMP1B101

## (undated) DEVICE — IMMOBILIZER KNEE 3 PNL 19 IN

## (undated) DEVICE — COVER,MAYO STAND,STERILE: Brand: MEDLINE

## (undated) DEVICE — YANKAUER,FLEXIBLE HANDLE,REGLR CAPACITY: Brand: MEDLINE INDUSTRIES, INC.

## (undated) DEVICE — DRESSING,GAUZE,XEROFORM,CURAD,1"X8",ST: Brand: CURAD